# Patient Record
Sex: FEMALE | Race: WHITE | NOT HISPANIC OR LATINO | Employment: OTHER | URBAN - METROPOLITAN AREA
[De-identification: names, ages, dates, MRNs, and addresses within clinical notes are randomized per-mention and may not be internally consistent; named-entity substitution may affect disease eponyms.]

---

## 2020-07-31 ENCOUNTER — APPOINTMENT (OUTPATIENT)
Dept: CT IMAGING | Facility: HOSPITAL | Age: 80
End: 2020-07-31

## 2020-07-31 ENCOUNTER — APPOINTMENT (OUTPATIENT)
Dept: CARDIOLOGY | Facility: HOSPITAL | Age: 80
End: 2020-07-31

## 2020-07-31 ENCOUNTER — HOSPITAL ENCOUNTER (INPATIENT)
Facility: HOSPITAL | Age: 80
LOS: 6 days | Discharge: NURSING, REHAB OR LONG-TERM CARE | End: 2020-08-06
Attending: EMERGENCY MEDICINE | Admitting: INTERNAL MEDICINE

## 2020-07-31 ENCOUNTER — APPOINTMENT (OUTPATIENT)
Dept: GENERAL RADIOLOGY | Facility: HOSPITAL | Age: 80
End: 2020-07-31

## 2020-07-31 DIAGNOSIS — I67.848 OTHER CEREBROVASCULAR VASOSPASM AND VASOCONSTRICTION: ICD-10-CM

## 2020-07-31 DIAGNOSIS — Z78.9 IMPAIRED MOBILITY AND ADLS: ICD-10-CM

## 2020-07-31 DIAGNOSIS — R47.1 DYSARTHRIA: ICD-10-CM

## 2020-07-31 DIAGNOSIS — I63.9 ACUTE ISCHEMIC STROKE (HCC): Primary | ICD-10-CM

## 2020-07-31 DIAGNOSIS — I63.9 ACUTE ISCHEMIC STROKE (HCC): ICD-10-CM

## 2020-07-31 DIAGNOSIS — R41.841 COGNITIVE COMMUNICATION DEFICIT: ICD-10-CM

## 2020-07-31 DIAGNOSIS — Z74.09 IMPAIRED MOBILITY AND ADLS: ICD-10-CM

## 2020-07-31 DIAGNOSIS — R13.12 OROPHARYNGEAL DYSPHAGIA: ICD-10-CM

## 2020-07-31 PROBLEM — E03.9 HYPOTHYROIDISM: Status: ACTIVE | Noted: 2020-07-31

## 2020-07-31 PROBLEM — E11.9 TYPE 2 DIABETES MELLITUS (HCC): Chronic | Status: ACTIVE | Noted: 2020-07-31

## 2020-07-31 PROBLEM — I10 HYPERTENSION: Status: ACTIVE | Noted: 2020-07-31

## 2020-07-31 PROBLEM — E03.9 HYPOTHYROIDISM: Chronic | Status: ACTIVE | Noted: 2020-07-31

## 2020-07-31 PROBLEM — F17.200 SMOKER: Status: ACTIVE | Noted: 2020-07-31

## 2020-07-31 PROBLEM — E11.9 TYPE 2 DIABETES MELLITUS (HCC): Status: ACTIVE | Noted: 2020-07-31

## 2020-07-31 PROBLEM — I10 HYPERTENSION: Chronic | Status: ACTIVE | Noted: 2020-07-31

## 2020-07-31 PROBLEM — E78.5 DYSLIPIDEMIA: Status: ACTIVE | Noted: 2020-07-31

## 2020-07-31 PROBLEM — C82.90 FOLLICULAR LYMPHOMA (HCC): Status: ACTIVE | Noted: 2020-07-31

## 2020-07-31 PROBLEM — E78.5 DYSLIPIDEMIA: Chronic | Status: ACTIVE | Noted: 2020-07-31

## 2020-07-31 LAB
ALT SERPL W P-5'-P-CCNC: 13 U/L (ref 1–33)
APTT PPP: 30.8 SECONDS (ref 24–37)
AST SERPL-CCNC: 19 U/L (ref 1–32)
BASE EXCESS BLDA CALC-SCNC: 4 MMOL/L (ref -5–5)
BASOPHILS # BLD AUTO: 0.03 10*3/MM3 (ref 0–0.2)
BASOPHILS NFR BLD AUTO: 0.3 % (ref 0–1.5)
BH CV ECHO MEAS - AO MAX PG (FULL): 5.3 MMHG
BH CV ECHO MEAS - AO MAX PG: 8.2 MMHG
BH CV ECHO MEAS - AO ROOT AREA (BSA CORRECTED): 1.7
BH CV ECHO MEAS - AO ROOT AREA: 8.3 CM^2
BH CV ECHO MEAS - AO ROOT DIAM: 3.3 CM
BH CV ECHO MEAS - AO V2 MAX: 143.5 CM/SEC
BH CV ECHO MEAS - AVA(V,A): 1.8 CM^2
BH CV ECHO MEAS - AVA(V,D): 1.8 CM^2
BH CV ECHO MEAS - BSA(HAYCOCK): 1.9 M^2
BH CV ECHO MEAS - BSA: 1.9 M^2
BH CV ECHO MEAS - BZI_BMI: 28.1 KILOGRAMS/M^2
BH CV ECHO MEAS - BZI_METRIC_HEIGHT: 167.6 CM
BH CV ECHO MEAS - BZI_METRIC_WEIGHT: 78.9 KG
BH CV ECHO MEAS - EDV(CUBED): 107.8 ML
BH CV ECHO MEAS - EDV(MOD-SP2): 25 ML
BH CV ECHO MEAS - EDV(MOD-SP4): 51 ML
BH CV ECHO MEAS - EDV(TEICH): 105.4 ML
BH CV ECHO MEAS - EF(CUBED): 71.9 %
BH CV ECHO MEAS - EF(MOD-BP): 54 %
BH CV ECHO MEAS - EF(MOD-SP2): 44 %
BH CV ECHO MEAS - EF(MOD-SP4): 60.8 %
BH CV ECHO MEAS - EF(TEICH): 63.5 %
BH CV ECHO MEAS - ESV(CUBED): 30.3 ML
BH CV ECHO MEAS - ESV(MOD-SP2): 14 ML
BH CV ECHO MEAS - ESV(MOD-SP4): 20 ML
BH CV ECHO MEAS - ESV(TEICH): 38.5 ML
BH CV ECHO MEAS - FS: 34.5 %
BH CV ECHO MEAS - IVS/LVPW: 0.93
BH CV ECHO MEAS - IVSD: 0.99 CM
BH CV ECHO MEAS - LA DIMENSION: 3.3 CM
BH CV ECHO MEAS - LA/AO: 1
BH CV ECHO MEAS - LAD MAJOR: 4.7 CM
BH CV ECHO MEAS - LAT PEAK E' VEL: 7 CM/SEC
BH CV ECHO MEAS - LATERAL E/E' RATIO: 11.1
BH CV ECHO MEAS - LV DIASTOLIC VOL/BSA (35-75): 27.1 ML/M^2
BH CV ECHO MEAS - LV MASS(C)D: 174.1 GRAMS
BH CV ECHO MEAS - LV MASS(C)DI: 92.4 GRAMS/M^2
BH CV ECHO MEAS - LV MAX PG: 3 MMHG
BH CV ECHO MEAS - LV MEAN PG: 1.6 MMHG
BH CV ECHO MEAS - LV SYSTOLIC VOL/BSA (12-30): 10.6 ML/M^2
BH CV ECHO MEAS - LV V1 MAX: 86 CM/SEC
BH CV ECHO MEAS - LV V1 MEAN: 59.6 CM/SEC
BH CV ECHO MEAS - LV V1 VTI: 19.7 CM
BH CV ECHO MEAS - LVIDD: 4.8 CM
BH CV ECHO MEAS - LVIDS: 3.1 CM
BH CV ECHO MEAS - LVLD AP2: 6.4 CM
BH CV ECHO MEAS - LVLD AP4: 6.4 CM
BH CV ECHO MEAS - LVLS AP2: 5.5 CM
BH CV ECHO MEAS - LVLS AP4: 5.8 CM
BH CV ECHO MEAS - LVOT AREA (M): 2.8 CM^2
BH CV ECHO MEAS - LVOT AREA: 2.9 CM^2
BH CV ECHO MEAS - LVOT DIAM: 1.9 CM
BH CV ECHO MEAS - LVPWD: 1 CM
BH CV ECHO MEAS - MED PEAK E' VEL: 5.2 CM/SEC
BH CV ECHO MEAS - MEDIAL E/E' RATIO: 15
BH CV ECHO MEAS - MV A MAX VEL: 100.7 CM/SEC
BH CV ECHO MEAS - MV DEC TIME: 0.16 SEC
BH CV ECHO MEAS - MV E MAX VEL: 79.5 CM/SEC
BH CV ECHO MEAS - MV E/A: 0.79
BH CV ECHO MEAS - PA ACC SLOPE: 796.1 CM/SEC^2
BH CV ECHO MEAS - PA ACC TIME: 0.11 SEC
BH CV ECHO MEAS - PA MAX PG: 5.3 MMHG
BH CV ECHO MEAS - PA PR(ACCEL): 27.5 MMHG
BH CV ECHO MEAS - PA V2 MAX: 115.1 CM/SEC
BH CV ECHO MEAS - SI(CUBED): 41.1 ML/M^2
BH CV ECHO MEAS - SI(LVOT): 30.6 ML/M^2
BH CV ECHO MEAS - SI(MOD-SP2): 5.8 ML/M^2
BH CV ECHO MEAS - SI(MOD-SP4): 16.4 ML/M^2
BH CV ECHO MEAS - SI(TEICH): 35.5 ML/M^2
BH CV ECHO MEAS - SV(CUBED): 77.5 ML
BH CV ECHO MEAS - SV(LVOT): 57.7 ML
BH CV ECHO MEAS - SV(MOD-SP2): 11 ML
BH CV ECHO MEAS - SV(MOD-SP4): 31 ML
BH CV ECHO MEAS - SV(TEICH): 66.9 ML
BH CV ECHO MEAS - TAPSE (>1.6): 2.2 CM2
BH CV ECHO MEASUREMENTS AVERAGE E/E' RATIO: 13.03
BH CV VAS BP RIGHT ARM: NORMAL MMHG
BH CV XLRA - RV BASE: 3.2 CM
BH CV XLRA - RV LENGTH: 8.1 CM
BH CV XLRA - RV MID: 3 CM
BH CV XLRA - TDI S': 14.4 CM/SEC
CA-I BLDA-SCNC: 1.2 MMOL/L (ref 1.2–1.32)
CO2 BLDA-SCNC: 31 MMOL/L (ref 24–29)
CREAT BLDA-MCNC: 1.3 MG/DL (ref 0.6–1.3)
DEPRECATED RDW RBC AUTO: 46.6 FL (ref 37–54)
EOSINOPHIL # BLD AUTO: 0.14 10*3/MM3 (ref 0–0.4)
EOSINOPHIL NFR BLD AUTO: 1.2 % (ref 0.3–6.2)
ERYTHROCYTE [DISTWIDTH] IN BLOOD BY AUTOMATED COUNT: 14.1 % (ref 12.3–15.4)
GLUCOSE BLDC GLUCOMTR-MCNC: 197 MG/DL (ref 70–130)
GLUCOSE BLDC GLUCOMTR-MCNC: 235 MG/DL (ref 70–130)
GLUCOSE BLDC GLUCOMTR-MCNC: 267 MG/DL (ref 70–130)
GLUCOSE BLDC GLUCOMTR-MCNC: 271 MG/DL (ref 70–130)
HCO3 BLDA-SCNC: 29.1 MMOL/L (ref 22–26)
HCT VFR BLD AUTO: 42 % (ref 34–46.6)
HCT VFR BLDA CALC: 46 % (ref 38–51)
HGB BLD-MCNC: 13.8 G/DL (ref 12–15.9)
HGB BLDA-MCNC: 15.6 G/DL (ref 12–17)
HOLD SPECIMEN: NORMAL
HOLD SPECIMEN: NORMAL
IMM GRANULOCYTES # BLD AUTO: 0.03 10*3/MM3 (ref 0–0.05)
IMM GRANULOCYTES NFR BLD AUTO: 0.3 % (ref 0–0.5)
INR PPP: 0.9 (ref 0.8–1.2)
LEFT ATRIUM VOLUME INDEX: 21.7 ML/M^2
LEFT ATRIUM VOLUME: 41 ML
LV EF 2D ECHO EST: 55 %
LYMPHOCYTES # BLD AUTO: 1.54 10*3/MM3 (ref 0.7–3.1)
LYMPHOCYTES NFR BLD AUTO: 12.9 % (ref 19.6–45.3)
MAXIMAL PREDICTED HEART RATE: 142 BPM
MCH RBC QN AUTO: 29.6 PG (ref 26.6–33)
MCHC RBC AUTO-ENTMCNC: 32.9 G/DL (ref 31.5–35.7)
MCV RBC AUTO: 89.9 FL (ref 79–97)
MONOCYTES # BLD AUTO: 0.82 10*3/MM3 (ref 0.1–0.9)
MONOCYTES NFR BLD AUTO: 6.9 % (ref 5–12)
NEUTROPHILS NFR BLD AUTO: 78.4 % (ref 42.7–76)
NEUTROPHILS NFR BLD AUTO: 9.37 10*3/MM3 (ref 1.7–7)
NRBC BLD AUTO-RTO: 0 /100 WBC (ref 0–0.2)
PCO2 BLDA: 46.9 MM HG (ref 35–45)
PH BLDA: 7.4 PH UNITS (ref 7.35–7.6)
PLATELET # BLD AUTO: 234 10*3/MM3 (ref 140–450)
PMV BLD AUTO: 10 FL (ref 6–12)
PO2 BLDA: 35 MMHG (ref 80–105)
POTASSIUM BLDA-SCNC: 4 MMOL/L (ref 3.5–4.9)
PROTHROMBIN TIME: 11.2 SECONDS (ref 12.8–15.2)
RBC # BLD AUTO: 4.67 10*6/MM3 (ref 3.77–5.28)
SAO2 % BLDA: 66 % (ref 95–98)
SARS-COV-2 RNA RESP QL NAA+PROBE: NOT DETECTED
SODIUM BLD-SCNC: 141 MMOL/L (ref 138–146)
STRESS TARGET HR: 121 BPM
TROPONIN T SERPL-MCNC: <0.01 NG/ML (ref 0–0.03)
WBC # BLD AUTO: 11.93 10*3/MM3 (ref 3.4–10.8)
WHOLE BLOOD HOLD SPECIMEN: NORMAL
WHOLE BLOOD HOLD SPECIMEN: NORMAL

## 2020-07-31 PROCEDURE — 87635 SARS-COV-2 COVID-19 AMP PRB: CPT | Performed by: EMERGENCY MEDICINE

## 2020-07-31 PROCEDURE — 63710000001 INSULIN REGULAR HUMAN PER 5 UNITS: Performed by: INTERNAL MEDICINE

## 2020-07-31 PROCEDURE — C1894 INTRO/SHEATH, NON-LASER: HCPCS | Performed by: NEUROLOGICAL SURGERY

## 2020-07-31 PROCEDURE — 99285 EMERGENCY DEPT VISIT HI MDM: CPT

## 2020-07-31 PROCEDURE — 82330 ASSAY OF CALCIUM: CPT

## 2020-07-31 PROCEDURE — 84450 TRANSFERASE (AST) (SGOT): CPT | Performed by: EMERGENCY MEDICINE

## 2020-07-31 PROCEDURE — 82565 ASSAY OF CREATININE: CPT

## 2020-07-31 PROCEDURE — 84460 ALANINE AMINO (ALT) (SGPT): CPT | Performed by: EMERGENCY MEDICINE

## 2020-07-31 PROCEDURE — 93005 ELECTROCARDIOGRAM TRACING: CPT | Performed by: EMERGENCY MEDICINE

## 2020-07-31 PROCEDURE — 0 IOPAMIDOL PER 1 ML: Performed by: EMERGENCY MEDICINE

## 2020-07-31 PROCEDURE — 61645 PERQ ART M-THROMBECT &/NFS: CPT | Performed by: NEUROLOGICAL SURGERY

## 2020-07-31 PROCEDURE — C2628 CATHETER, OCCLUSION: HCPCS | Performed by: NEUROLOGICAL SURGERY

## 2020-07-31 PROCEDURE — B41F1ZZ FLUOROSCOPY OF RIGHT LOWER EXTREMITY ARTERIES USING LOW OSMOLAR CONTRAST: ICD-10-PCS | Performed by: NEUROLOGICAL SURGERY

## 2020-07-31 PROCEDURE — C1769 GUIDE WIRE: HCPCS | Performed by: NEUROLOGICAL SURGERY

## 2020-07-31 PROCEDURE — 82962 GLUCOSE BLOOD TEST: CPT

## 2020-07-31 PROCEDURE — 71045 X-RAY EXAM CHEST 1 VIEW: CPT

## 2020-07-31 PROCEDURE — 93306 TTE W/DOPPLER COMPLETE: CPT | Performed by: INTERNAL MEDICINE

## 2020-07-31 PROCEDURE — C1887 CATHETER, GUIDING: HCPCS | Performed by: NEUROLOGICAL SURGERY

## 2020-07-31 PROCEDURE — 99291 CRITICAL CARE FIRST HOUR: CPT | Performed by: INTERNAL MEDICINE

## 2020-07-31 PROCEDURE — 70450 CT HEAD/BRAIN W/O DYE: CPT

## 2020-07-31 PROCEDURE — 0042T HC CT CEREBRAL PERFUSION W/WO CONTRAST: CPT

## 2020-07-31 PROCEDURE — B3161ZZ FLUOROSCOPY OF RIGHT INTERNAL CAROTID ARTERY USING LOW OSMOLAR CONTRAST: ICD-10-PCS | Performed by: NEUROLOGICAL SURGERY

## 2020-07-31 PROCEDURE — 85025 COMPLETE CBC W/AUTO DIFF WBC: CPT | Performed by: EMERGENCY MEDICINE

## 2020-07-31 PROCEDURE — 84132 ASSAY OF SERUM POTASSIUM: CPT

## 2020-07-31 PROCEDURE — 85014 HEMATOCRIT: CPT

## 2020-07-31 PROCEDURE — 82803 BLOOD GASES ANY COMBINATION: CPT

## 2020-07-31 PROCEDURE — C1760 CLOSURE DEV, VASC: HCPCS | Performed by: NEUROLOGICAL SURGERY

## 2020-07-31 PROCEDURE — B3131ZZ FLUOROSCOPY OF RIGHT COMMON CAROTID ARTERY USING LOW OSMOLAR CONTRAST: ICD-10-PCS | Performed by: NEUROLOGICAL SURGERY

## 2020-07-31 PROCEDURE — 25010000002 PHENYLEPHRINE 10 MG/ML SOLUTION: Performed by: PSYCHIATRY & NEUROLOGY

## 2020-07-31 PROCEDURE — 99291 CRITICAL CARE FIRST HOUR: CPT | Performed by: PSYCHIATRY & NEUROLOGY

## 2020-07-31 PROCEDURE — 25010000002 ONDANSETRON PER 1 MG: Performed by: PSYCHIATRY & NEUROLOGY

## 2020-07-31 PROCEDURE — 82947 ASSAY GLUCOSE BLOOD QUANT: CPT

## 2020-07-31 PROCEDURE — 0 IOPAMIDOL PER 1 ML: Performed by: INTERNAL MEDICINE

## 2020-07-31 PROCEDURE — 85730 THROMBOPLASTIN TIME PARTIAL: CPT | Performed by: EMERGENCY MEDICINE

## 2020-07-31 PROCEDURE — 70496 CT ANGIOGRAPHY HEAD: CPT

## 2020-07-31 PROCEDURE — 84484 ASSAY OF TROPONIN QUANT: CPT | Performed by: EMERGENCY MEDICINE

## 2020-07-31 PROCEDURE — 25010000002 HYDRALAZINE PER 20 MG: Performed by: NEUROLOGICAL SURGERY

## 2020-07-31 PROCEDURE — 0 IODIXANOL PER 1 ML: Performed by: NEUROLOGICAL SURGERY

## 2020-07-31 PROCEDURE — 70498 CT ANGIOGRAPHY NECK: CPT

## 2020-07-31 PROCEDURE — 93306 TTE W/DOPPLER COMPLETE: CPT

## 2020-07-31 PROCEDURE — 84295 ASSAY OF SERUM SODIUM: CPT

## 2020-07-31 PROCEDURE — 85610 PROTHROMBIN TIME: CPT

## 2020-07-31 PROCEDURE — 03CG3ZZ EXTIRPATION OF MATTER FROM INTRACRANIAL ARTERY, PERCUTANEOUS APPROACH: ICD-10-PCS | Performed by: NEUROLOGICAL SURGERY

## 2020-07-31 RX ORDER — SODIUM CHLORIDE 0.9 % (FLUSH) 0.9 %
10 SYRINGE (ML) INJECTION EVERY 12 HOURS SCHEDULED
Status: DISCONTINUED | OUTPATIENT
Start: 2020-07-31 | End: 2020-08-06 | Stop reason: HOSPADM

## 2020-07-31 RX ORDER — LORATADINE 10 MG/1
10 TABLET ORAL EVERY MORNING
COMMUNITY

## 2020-07-31 RX ORDER — LIDOCAINE HYDROCHLORIDE 10 MG/ML
INJECTION, SOLUTION EPIDURAL; INFILTRATION; INTRACAUDAL; PERINEURAL AS NEEDED
Status: DISCONTINUED | OUTPATIENT
Start: 2020-07-31 | End: 2020-07-31 | Stop reason: HOSPADM

## 2020-07-31 RX ORDER — LEVOTHYROXINE SODIUM 88 UG/1
88 TABLET ORAL EVERY MORNING
Status: DISCONTINUED | OUTPATIENT
Start: 2020-07-31 | End: 2020-08-06 | Stop reason: HOSPADM

## 2020-07-31 RX ORDER — ASPIRIN 81 MG/1
81 TABLET, CHEWABLE ORAL DAILY
COMMUNITY
End: 2020-08-06 | Stop reason: HOSPADM

## 2020-07-31 RX ORDER — LEVOTHYROXINE SODIUM 88 UG/1
88 TABLET ORAL EVERY MORNING
COMMUNITY

## 2020-07-31 RX ORDER — IODIXANOL 320 MG/ML
INJECTION, SOLUTION INTRAVASCULAR AS NEEDED
Status: DISCONTINUED | OUTPATIENT
Start: 2020-07-31 | End: 2020-07-31 | Stop reason: HOSPADM

## 2020-07-31 RX ORDER — SPIRONOLACTONE 25 MG/1
25 TABLET ORAL 2 TIMES DAILY
COMMUNITY

## 2020-07-31 RX ORDER — ONDANSETRON 2 MG/ML
4 INJECTION INTRAMUSCULAR; INTRAVENOUS EVERY 6 HOURS PRN
Status: DISCONTINUED | OUTPATIENT
Start: 2020-07-31 | End: 2020-08-06 | Stop reason: HOSPADM

## 2020-07-31 RX ORDER — LOSARTAN POTASSIUM 100 MG/1
100 TABLET ORAL EVERY MORNING
COMMUNITY

## 2020-07-31 RX ORDER — ASPIRIN 300 MG/1
300 SUPPOSITORY RECTAL ONCE
Status: COMPLETED | OUTPATIENT
Start: 2020-07-31 | End: 2020-07-31

## 2020-07-31 RX ORDER — OMEPRAZOLE 20 MG/1
20 CAPSULE, DELAYED RELEASE ORAL EVERY MORNING
COMMUNITY
End: 2020-08-06 | Stop reason: HOSPADM

## 2020-07-31 RX ORDER — HYDRALAZINE HYDROCHLORIDE 20 MG/ML
INJECTION INTRAMUSCULAR; INTRAVENOUS AS NEEDED
Status: DISCONTINUED | OUTPATIENT
Start: 2020-07-31 | End: 2020-07-31 | Stop reason: HOSPADM

## 2020-07-31 RX ORDER — GABAPENTIN 300 MG/1
300 CAPSULE ORAL 2 TIMES DAILY
COMMUNITY

## 2020-07-31 RX ORDER — NIFEDIPINE 60 MG/1
60 TABLET, EXTENDED RELEASE ORAL EVERY MORNING
COMMUNITY

## 2020-07-31 RX ORDER — ASPIRIN 325 MG
325 TABLET ORAL DAILY
Status: DISCONTINUED | OUTPATIENT
Start: 2020-08-01 | End: 2020-08-06 | Stop reason: HOSPADM

## 2020-07-31 RX ORDER — SODIUM CHLORIDE 0.9 % (FLUSH) 0.9 %
10 SYRINGE (ML) INJECTION AS NEEDED
Status: DISCONTINUED | OUTPATIENT
Start: 2020-07-31 | End: 2020-08-06 | Stop reason: HOSPADM

## 2020-07-31 RX ORDER — ASPIRIN 300 MG/1
300 SUPPOSITORY RECTAL DAILY
Status: DISCONTINUED | OUTPATIENT
Start: 2020-08-01 | End: 2020-08-06 | Stop reason: HOSPADM

## 2020-07-31 RX ORDER — ATORVASTATIN CALCIUM 40 MG/1
40 TABLET, FILM COATED ORAL
Status: ON HOLD | COMMUNITY
End: 2020-08-06 | Stop reason: SDUPTHER

## 2020-07-31 RX ORDER — ATORVASTATIN CALCIUM 40 MG/1
80 TABLET, FILM COATED ORAL NIGHTLY
Status: DISCONTINUED | OUTPATIENT
Start: 2020-07-31 | End: 2020-08-06 | Stop reason: HOSPADM

## 2020-07-31 RX ORDER — METOPROLOL SUCCINATE 25 MG/1
25 TABLET, EXTENDED RELEASE ORAL
Status: ON HOLD | COMMUNITY
End: 2020-08-06 | Stop reason: SDUPTHER

## 2020-07-31 RX ORDER — PHENYLEPHRINE HCL IN 0.9% NACL 0.5 MG/5ML
.5-3 SYRINGE (ML) INTRAVENOUS
Status: DISCONTINUED | OUTPATIENT
Start: 2020-07-31 | End: 2020-08-02

## 2020-07-31 RX ADMIN — IOPAMIDOL 100 ML: 755 INJECTION, SOLUTION INTRAVENOUS at 09:45

## 2020-07-31 RX ADMIN — SODIUM CHLORIDE, PRESERVATIVE FREE 10 ML: 5 INJECTION INTRAVENOUS at 20:23

## 2020-07-31 RX ADMIN — NICARDIPINE HYDROCHLORIDE 12.5 MG/HR: 0.1 INJECTION, SOLUTION INTRAVENOUS at 08:24

## 2020-07-31 RX ADMIN — ONDANSETRON 4 MG: 2 INJECTION INTRAMUSCULAR; INTRAVENOUS at 15:16

## 2020-07-31 RX ADMIN — ONDANSETRON 4 MG: 2 INJECTION INTRAMUSCULAR; INTRAVENOUS at 09:15

## 2020-07-31 RX ADMIN — INSULIN HUMAN 4 UNITS: 100 INJECTION, SOLUTION PARENTERAL at 18:07

## 2020-07-31 RX ADMIN — INSULIN HUMAN 2 UNITS: 100 INJECTION, SOLUTION PARENTERAL at 12:17

## 2020-07-31 RX ADMIN — IOPAMIDOL 115 ML: 755 INJECTION, SOLUTION INTRAVENOUS at 05:24

## 2020-07-31 RX ADMIN — PHENYLEPHRINE HYDROCHLORIDE 0.5 MCG/KG/MIN: 10 INJECTION INTRAVENOUS at 09:49

## 2020-07-31 RX ADMIN — ASPIRIN 300 MG: 300 SUPPOSITORY RECTAL at 06:25

## 2020-07-31 RX ADMIN — SODIUM CHLORIDE, PRESERVATIVE FREE 10 ML: 5 INJECTION INTRAVENOUS at 10:06

## 2020-08-01 ENCOUNTER — APPOINTMENT (OUTPATIENT)
Dept: MRI IMAGING | Facility: HOSPITAL | Age: 80
End: 2020-08-01

## 2020-08-01 ENCOUNTER — APPOINTMENT (OUTPATIENT)
Dept: CT IMAGING | Facility: HOSPITAL | Age: 80
End: 2020-08-01

## 2020-08-01 LAB
ANION GAP SERPL CALCULATED.3IONS-SCNC: 19 MMOL/L (ref 5–15)
BASOPHILS # BLD AUTO: 0.02 10*3/MM3 (ref 0–0.2)
BASOPHILS NFR BLD AUTO: 0.1 % (ref 0–1.5)
BUN SERPL-MCNC: 26 MG/DL (ref 8–23)
BUN/CREAT SERPL: 14.4 (ref 7–25)
CALCIUM SPEC-SCNC: 9.7 MG/DL (ref 8.6–10.5)
CHLORIDE SERPL-SCNC: 101 MMOL/L (ref 98–107)
CHOLEST SERPL-MCNC: 135 MG/DL (ref 0–200)
CO2 SERPL-SCNC: 20 MMOL/L (ref 22–29)
CREAT SERPL-MCNC: 1.81 MG/DL (ref 0.57–1)
DEPRECATED RDW RBC AUTO: 51 FL (ref 37–54)
EOSINOPHIL # BLD AUTO: 0 10*3/MM3 (ref 0–0.4)
EOSINOPHIL NFR BLD AUTO: 0 % (ref 0.3–6.2)
ERYTHROCYTE [DISTWIDTH] IN BLOOD BY AUTOMATED COUNT: 14.8 % (ref 12.3–15.4)
GFR SERPL CREATININE-BSD FRML MDRD: 27 ML/MIN/1.73
GLUCOSE BLDC GLUCOMTR-MCNC: 226 MG/DL (ref 70–130)
GLUCOSE BLDC GLUCOMTR-MCNC: 232 MG/DL (ref 70–130)
GLUCOSE BLDC GLUCOMTR-MCNC: 266 MG/DL (ref 70–130)
GLUCOSE BLDC GLUCOMTR-MCNC: 276 MG/DL (ref 70–130)
GLUCOSE SERPL-MCNC: 257 MG/DL (ref 65–99)
HBA1C MFR BLD: 7.3 % (ref 4.8–5.6)
HCT VFR BLD AUTO: 41.3 % (ref 34–46.6)
HDLC SERPL-MCNC: 57 MG/DL (ref 40–60)
HGB BLD-MCNC: 12.8 G/DL (ref 12–15.9)
IMM GRANULOCYTES # BLD AUTO: 0.1 10*3/MM3 (ref 0–0.05)
IMM GRANULOCYTES NFR BLD AUTO: 0.6 % (ref 0–0.5)
LDLC SERPL CALC-MCNC: 52 MG/DL (ref 0–100)
LDLC/HDLC SERPL: 0.92 {RATIO}
LYMPHOCYTES # BLD AUTO: 1.02 10*3/MM3 (ref 0.7–3.1)
LYMPHOCYTES NFR BLD AUTO: 5.9 % (ref 19.6–45.3)
MAGNESIUM SERPL-MCNC: 2.9 MG/DL (ref 1.6–2.4)
MCH RBC QN AUTO: 29.2 PG (ref 26.6–33)
MCHC RBC AUTO-ENTMCNC: 31 G/DL (ref 31.5–35.7)
MCV RBC AUTO: 94.3 FL (ref 79–97)
MONOCYTES # BLD AUTO: 1.74 10*3/MM3 (ref 0.1–0.9)
MONOCYTES NFR BLD AUTO: 10 % (ref 5–12)
NEUTROPHILS NFR BLD AUTO: 14.52 10*3/MM3 (ref 1.7–7)
NEUTROPHILS NFR BLD AUTO: 83.4 % (ref 42.7–76)
NRBC BLD AUTO-RTO: 0 /100 WBC (ref 0–0.2)
PLATELET # BLD AUTO: 197 10*3/MM3 (ref 140–450)
PMV BLD AUTO: 10 FL (ref 6–12)
POTASSIUM SERPL-SCNC: 4.5 MMOL/L (ref 3.5–5.2)
RBC # BLD AUTO: 4.38 10*6/MM3 (ref 3.77–5.28)
SODIUM SERPL-SCNC: 140 MMOL/L (ref 136–145)
TRIGL SERPL-MCNC: 128 MG/DL (ref 0–150)
TSH SERPL DL<=0.05 MIU/L-ACNC: 4.04 UIU/ML (ref 0.27–4.2)
VLDLC SERPL-MCNC: 25.6 MG/DL
WBC # BLD AUTO: 17.4 10*3/MM3 (ref 3.4–10.8)

## 2020-08-01 PROCEDURE — 97530 THERAPEUTIC ACTIVITIES: CPT

## 2020-08-01 PROCEDURE — 92610 EVALUATE SWALLOWING FUNCTION: CPT

## 2020-08-01 PROCEDURE — 80061 LIPID PANEL: CPT | Performed by: NURSE PRACTITIONER

## 2020-08-01 PROCEDURE — 83735 ASSAY OF MAGNESIUM: CPT | Performed by: INTERNAL MEDICINE

## 2020-08-01 PROCEDURE — 99232 SBSQ HOSP IP/OBS MODERATE 35: CPT | Performed by: INTERNAL MEDICINE

## 2020-08-01 PROCEDURE — 70450 CT HEAD/BRAIN W/O DYE: CPT

## 2020-08-01 PROCEDURE — 85025 COMPLETE CBC W/AUTO DIFF WBC: CPT | Performed by: INTERNAL MEDICINE

## 2020-08-01 PROCEDURE — 84443 ASSAY THYROID STIM HORMONE: CPT | Performed by: NURSE PRACTITIONER

## 2020-08-01 PROCEDURE — 92523 SPEECH SOUND LANG COMPREHEN: CPT

## 2020-08-01 PROCEDURE — 83036 HEMOGLOBIN GLYCOSYLATED A1C: CPT | Performed by: NURSE PRACTITIONER

## 2020-08-01 PROCEDURE — 80048 BASIC METABOLIC PNL TOTAL CA: CPT | Performed by: INTERNAL MEDICINE

## 2020-08-01 PROCEDURE — 97162 PT EVAL MOD COMPLEX 30 MIN: CPT

## 2020-08-01 PROCEDURE — 82962 GLUCOSE BLOOD TEST: CPT

## 2020-08-01 PROCEDURE — 70551 MRI BRAIN STEM W/O DYE: CPT

## 2020-08-01 PROCEDURE — 63710000001 INSULIN REGULAR HUMAN PER 5 UNITS

## 2020-08-01 PROCEDURE — 97165 OT EVAL LOW COMPLEX 30 MIN: CPT

## 2020-08-01 RX ORDER — SODIUM CHLORIDE 9 MG/ML
50 INJECTION, SOLUTION INTRAVENOUS CONTINUOUS
Status: DISCONTINUED | OUTPATIENT
Start: 2020-08-01 | End: 2020-08-02

## 2020-08-01 RX ORDER — CLOPIDOGREL BISULFATE 75 MG/1
75 TABLET ORAL DAILY
Status: DISCONTINUED | OUTPATIENT
Start: 2020-08-01 | End: 2020-08-06 | Stop reason: HOSPADM

## 2020-08-01 RX ADMIN — SODIUM CHLORIDE 50 ML/HR: 9 INJECTION, SOLUTION INTRAVENOUS at 18:11

## 2020-08-01 RX ADMIN — INSULIN HUMAN 4 UNITS: 100 INJECTION, SOLUTION PARENTERAL at 06:03

## 2020-08-01 RX ADMIN — ATORVASTATIN CALCIUM 80 MG: 40 TABLET, FILM COATED ORAL at 20:02

## 2020-08-01 RX ADMIN — CLOPIDOGREL BISULFATE 75 MG: 75 TABLET ORAL at 12:26

## 2020-08-01 RX ADMIN — INSULIN HUMAN 3 UNITS: 100 INJECTION, SOLUTION PARENTERAL at 12:26

## 2020-08-01 RX ADMIN — SODIUM CHLORIDE, PRESERVATIVE FREE 10 ML: 5 INJECTION INTRAVENOUS at 20:02

## 2020-08-01 RX ADMIN — INSULIN HUMAN 4 UNITS: 100 INJECTION, SOLUTION PARENTERAL at 00:46

## 2020-08-01 RX ADMIN — NICARDIPINE HYDROCHLORIDE 2.5 MG/HR: 0.1 INJECTION, SOLUTION INTRAVENOUS at 19:59

## 2020-08-01 RX ADMIN — ASPIRIN 300 MG: 300 SUPPOSITORY RECTAL at 08:11

## 2020-08-01 RX ADMIN — INSULIN HUMAN 3 UNITS: 100 INJECTION, SOLUTION PARENTERAL at 16:18

## 2020-08-01 RX ADMIN — SODIUM CHLORIDE, PRESERVATIVE FREE 10 ML: 5 INJECTION INTRAVENOUS at 08:13

## 2020-08-01 NOTE — THERAPY RE-EVALUATION
Acute Care - Speech Language Pathology   Swallow Re-Evaluation [facility]     Patient Name: Marquez Myrick  : 1940  MRN: [id number]  Today's Date: 2020  Onset of Illness/Injury or Date of Surgery: 20     Referring Physician: BRITTNEE Coyle      Admit Date: 2020    Visit Dx:     ICD-10-CM ICD-9-CM   1. Acute ischemic stroke (CMS/HCC) I63.9 434.91   2. Dysarthria R47.1 784.51   3. Impaired mobility and ADLs Z74.09 V49.89    Z78.9    4. Dysphagia, unspecified type R13.10 787.20   5. Cognitive communication deficit R41.841 799.52     Patient Active Problem List   Diagnosis   • H/O grade II follicular lymphoma    • Hypertension   • Dyslipidemia   • Hypothyroidism   • T2DM    • AIS    • Smoker     Past Medical History:   Diagnosis Date   • Dyslipidemia 2020   • Hypertension 2020   • Hypothyroidism 2020     Past Surgical History:   Procedure Laterality Date   • APPENDECTOMY     • CHOLECYSTECTOMY     • COLON SURGERY Right     R colon resection    • DEEP NECK LYMPH NODE BIOPSY / EXCISION     • HYSTERECTOMY     • PORTACATH PLACEMENT Left     L subclavian         SWALLOW EVALUATION (last 72 hours)      SLP Adult Swallow Evaluation     Row Name 20 1420 20 0800                Rehab Evaluation    Document Type  re-evaluation  -  evaluation  -SM       Subjective Information  --  no complaints  -SM       Patient Observations  lethargic;cooperative  -  lethargic;cooperative  -       Patient/Family Observations  Son present  -SM  Dtr present  -          General Information    Patient Profile Reviewed  --  yes  -SM       Pertinent History Of Current Problem  --  R frontal infarct, s/p mechanical thrombectomy  -       Current Method of Nutrition  NPO  -SM  NPO  -SM       Prior Level of Function-Swallowing  --  safe, efficient swallowing in all situations  -       Plans/Goals Discussed with  --  patient and family;agreed upon  -       Barriers to Rehab   --  none identified  -       Patient's Goals for Discharge  --  return to PO diet  -       Family Goals for Discharge  --  patient able to return to PO diet;patient able to eat/drink without coughing/choking  -          Pain Assessment    Additional Documentation  --  Pain Scale: Numbers Pre/Post-Treatment (Group)  -          Pain Scale: Numbers Pre/Post-Treatment    Pain Scale: Numbers, Pretreatment  --  0/10 - no pain  -SM       Pain Scale: Numbers, Post-Treatment  --  0/10 - no pain  -          Oral Musculature and Cranial Nerve Assessment    Oral Labial or Buccal Impairment, Detail, Cranial Nerve VII (Facial):  --  left labial droop  -SM       Lingual Impairment, Detail. Cranial Nerves IX, XII (Glossopharyngeal and Hypoglossal)  --  reduced strength left  -          Clinical Swallow Eval    Oral Prep Phase  --  impaired  -SM       Oral Transit  --  impaired  -SM       Pharyngeal Phase  --  suspected pharyngeal impairment  -       Clinical Swallow Evaluation Summary  Lethargic though when cued to alert, showing no s/s aspiraiton with nectar-thick liquids, pudding, or solids - though prolonged manipulation with solids. Will initiate modified PO diet and arrange for FEES tomorrow, 8/2. Allow PO intake only when alert. RN in agreement.   -SM  --          Oral Prep Concerns    Oral Prep Concerns  --  prolonged mastication;inefficient mastication  -          Oral Transit Concerns    Oral Transit Concerns  --  increased oral transit time  -          Pharyngeal Phase Concerns    Pharyngeal Phase Concerns  --  cough  -       Cough  --  thin;other (see comments) with large sip and as liquid wash  -          Clinical Impression    SLP Swallowing Diagnosis  moderate;oral dysfunction;suspected pharyngeal dysfunction  -  moderate;oral dysfunction;suspected pharyngeal dysfunction  -       Functional Impact  risk of aspiration/pneumonia  -  risk of aspiration/pneumonia  -          Recommendations     SLP Diet Recommendation  puree with some mashed;nectar thick liquids  -  NPO;other (see comments) ok for ice chips  -       Recommended Diagnostics  FEES 8/2  -  VFSS ([facility] [place])  -       Recommended Precautions and Strategies  upright posture during/after eating;small bites of food and sips of liquid;other (see comments) intake only when alert.   -  upright posture during/after eating  -       SLP Rec. for Method of Medication Administration  meds whole;meds crushed;with pudding or applesauce;as tolerated  -  meds whole;meds crushed;with pudding or applesauce  -       Monitor for Signs of Aspiration  yes;notify SLP if any concerns  -  --         User Key  (r) = Recorded By, (t) = Taken By, (c) = Cosigned By    Initials Name Effective Dates    Lisseth Martinez, MS CCC-SLP 06/22/15 -           EDUCATION  The patient has been educated in the following areas:   Dysphagia (Swallowing Impairment) Modified Diet Instruction.    SLP Recommendation and Plan  SLP Swallowing Diagnosis: moderate, oral dysfunction, suspected pharyngeal dysfunction  SLP Diet Recommendation: puree with some mashed, nectar thick liquids  Recommended Precautions and Strategies: upright posture during/after eating, small bites of food and sips of liquid, other (see comments)(intake only when alert. )  SLP Rec. for Method of Medication Administration: meds whole, meds crushed, with pudding or applesauce, as tolerated     Monitor for Signs of Aspiration: yes, notify SLP if any concerns  Recommended Diagnostics: FEES(8/2)     Anticipated Dischage Disposition (SLP): inpatient rehabilitation facility, anticipate therapy at next level of care        Predicted Duration Therapy Intervention (Days): until discharge       Plan of Care Reviewed With: patient, katelyn    SLP GOALS     Row Name 08/01/20 0800             Articulation Goal 1 (SLP)    Improve Articulation Goal 1 (SLP)  by over-articulating at word level;80%;with minimal cues  (75-90%)  -SM      Time Frame (Articulation Goal 1, SLP)  short term goal (STG)  -SM         Attention Goal 1 (SLP)    Improve Attention by Goal 1 (SLP)  complete sustained attention task;100%;with minimal cues (75-90%)  -SM      Time Frame (Attention Goal 1, SLP)  short term goal (STG)  -SM         Right Hemisphere Function Goal 1 (SLP)    Improve Right Hemisphere Function Through Goal 1 (SLP)  demonstrate awareness of communication partner in left visual field;use compensatory strategies for left neglect;identify physical/cognitive strengths and limitations;70%;with moderate cues (50-74%)  -SM      Time Frame (Right Hemisphere Function Goal 1, SLP)  short term goal (STG)  -SM         Additional Goal 1 (SLP)    Additional Goal 1, SLP  LTG: Improve cog-comm skills in order to participate in care while in hospital setting with 100% accuracy and min cues  -SM        User Key  (r) = Recorded By, (t) = Taken By, (c) = Cosigned By    Initials Name Provider Type    Lisseth Martinez MS CCC-SLP Speech and Language Pathologist             Time Calculation:   Time Calculation- SLP     Row Name 08/01/20 1602 08/01/20 1234          Time Calculation- SLP    SLP Start Time  1420  -SM  0800  -     SLP Received On  08/01/20  -  08/01/20  -       User Key  (r) = Recorded By, (t) = Taken By, (c) = Cosigned By    Initials Name Provider Type    Lisseth Martinez MS CCC-SLP Speech and Language Pathologist          Therapy Charges for Today     Code Description Service Date Service Provider Modifiers Qty    38838360703 HC ST EVAL ORAL PHARYNG SWALLOW 3 8/1/2020 Lisseth uSh MS CCC-SLP GN 1    24976501198 HC ST EVAL SPEECH AND PROD W LANG  2 8/1/2020 Lisseth Suh MS CCC-SLP GN 1    15433916129 HC ST EVAL ORAL PHARYNG SWALLOW 2 8/1/2020 Lisseth Suh MS CCC-SLP GN 1        Patient was not wearing a face mask and did not exhibit coughing during this therapy encounter.  Procedure performed was  aerosolizing, involved close contact (within 6 feet for at least 15 minutes or longer), and did not involve contact with infectious secretions or specimens.  Therapist used appropriate personal protective equipment including gloves, standard procedure mask and eye protection.  Appropriate PPE was worn during the entire therapy session.  Hand hygiene was completed before and after therapy session.          Lisseth Suh MS CCC-SLP  8/1/2020

## 2020-08-01 NOTE — PLAN OF CARE
Handoff NIHSS was a 12 this morning. No neuro changes on shift.     Speech evaluation done. Until FEEs can be done (tomorrow morning), the patient can have a dysphagia 3 diet, nectar, and pills in applesauce. Hold if patient appears to cough or have any difficulty.     Cardene currently on 5. Labile pressures. Watch closely.

## 2020-08-01 NOTE — PLAN OF CARE
Problem: Patient Care Overview  Goal: Plan of Care Review  Outcome: Ongoing (interventions implemented as appropriate)  Flowsheets (Taken 8/1/2020 0920)  Outcome Summary: OT initial eval and expanded chart review completed. Pt presents with multiple comorbidities and decreased independence with ADL's and mobility. Recommend continued skilled OT services and IRF at d/c.

## 2020-08-01 NOTE — PROGRESS NOTES
INTENSIVIST   PROGRESS NOTE     Hospital:  LOS: 1 day     Ms. Marquez Myrick, 80 y.o. female is followed for a Chief Complaint of: CVA      Subjective   S     Interval History:  No acute events overnight. Working with speech therapy.        The patient's relevant past medical, surgical and social history were reviewed and updated in Epic as appropriate.      ROS:   Constitutional: Negative for fever.   Respiratory: Negative for dyspnea.   Cardiovascular: Negative for chest pain.   Gastrointestinal: Negative for  nausea, vomiting and diarrhea.     Objective   O     Vitals:  Temp  Min: 97.8 °F (36.6 °C)  Max: 99.3 °F (37.4 °C)  BP  Min: 124/90  Max: 197/78  Pulse  Min: 70  Max: 94  Resp  Min: 16  Max: 24  SpO2  Min: 82 %  Max: 97 % Flow (L/min)  Min: 2  Max: 2    Intake/Ouptut 24 hrs (7:00AM - 6:59 AM)  Intake & Output (last 3 days)       07/29 0701 - 07/30 0700 07/30 0701 - 07/31 0700 07/31 0701 - 08/01 0700 08/01 0701 - 08/02 0700    P.O.   0     I.V. (mL/kg)   272.6 (3.5)     Total Intake(mL/kg)   272.6 (3.5)     Urine (mL/kg/hr)   2600 (1.4)     Emesis/NG output   0     Total Output   2600     Net   -2327.4             Urine Unmeasured Occurrence   2 x     Emesis Unmeasured Occurrence   4 x           Medications (drips):    niCARdipine Last Rate: 5 mg/hr (08/01/20 0957)   phenylephrine Last Rate: Stopped (07/31/20 1359)         Physical Examination  Telemetry:  Normal sinus rhythm.    Constitutional:  No acute distress.  Resting in bed.    Eyes: No scleral icterus.   PERRL, EOM intact.    Neck:  Supple, FROM   Cardiovascular: Normal rate, regular and rhythm. Normal heart sounds.  No murmurs, gallop or rub.   Respiratory: No respiratory distress. Normal respiratory effort.  Normal breath sounds  Clear to ascultation   Abdominal:  Soft. No masses. Non-tender. No distension. No HSM.   Extremities: No digital cyanosis. No clubbing.  No peripheral edema.   Skin: No rashes, lesions or ulcers   Neurological:   Alert and  interactive. Left sided neglect.        Interval: handoff  1a. Level of Consciousness: 1-->Not alert, but arousable by minor stimulation to obey, answer, or respond  1b. LOC Questions: 0-->Answers both questions correctly  1c. LOC Commands: 0-->Performs both tasks correctly  2. Best Gaze: 1-->Partial gaze palsy, gaze is abnormal in one or both eyes, but forced deviation or total gaze paresis is not present  3. Visual: 1-->Partial hemianopia  4. Facial Palsy: 3-->Complete paralysis of one or both sides (absence of facial movement in the upper and lower face)  5a. Motor Arm, Left: 1-->Drift, limb holds 90 (or 45) degrees, but drifts down before full 10 seconds, does not hit bed or other support  5b. Motor Arm, Right: 0-->No drift, limb holds 90 (or 45) degrees for full 10 secs  6a. Motor Leg, Left: 1-->Drift, leg falls by the end of the 5-sec period but does not hit bed  6b. Motor Leg, Right: 0-->No drift, leg holds 30 degree position for full 5 secs  7. Limb Ataxia: 0-->Absent  8. Sensory: 1-->Mild-to-moderate sensory loss, patient feels pinprick is less sharp or is dull on the affected side, or there is a loss of superficial pain with pinprick, but patient is aware of being touched  9. Best Language: 0-->No aphasia, normal  10. Dysarthria: 1-->Mild-to-moderate dysarthria, patient slurs at least some words and, at worst, can be understood with some difficulty  11. Extinction and Inattention (formerly Neglect): 2-->Profound bishop-inattention/extinction more than 1 modality    Total (NIH Stroke Scale): 12       Results from last 7 days   Lab Units 08/01/20  0742 07/31/20  0530 07/31/20  0522   WBC 10*3/mm3 17.40*  --  11.93*   HEMOGLOBIN g/dL 12.8  --  13.8   HEMOGLOBIN, POC g/dL  --  15.6  --    MCV fL 94.3  --  89.9   PLATELETS 10*3/mm3 197  --  234     Results from last 7 days   Lab Units 08/01/20  0742 07/31/20  0523   SODIUM mmol/L 140  --    POTASSIUM mmol/L 4.5  --    CO2 mmol/L 20.0*  --    CREATININE mg/dL  1.81* 1.30   GLUCOSE mg/dL 257*  --    MAGNESIUM mg/dL 2.9*  --      Estimated Creatinine Clearance: 27.1 mL/min (A) (by C-G formula based on SCr of 1.81 mg/dL (H)).  Results from last 7 days   Lab Units 07/31/20  0522   ALT (SGPT) U/L 13   AST (SGOT) U/L 19       Results from last 7 days   Lab Units 07/31/20  0530   PH, ARTERIAL pH units 7.40       Images:    Imaging Results (Last 24 Hours)     Procedure Component Value Units Date/Time    MRI Brain Without Contrast [[id number]] Collected:  08/01/20 0831     Updated:  08/01/20 0852    Narrative:       EXAMINATION: MRI BRAIN WO CONTRAST-     INDICATION: Stroke; I63.9-Cerebral infarction, unspecified;  R47.1-Dysarthria and anarthria left lower facial droop     TECHNIQUE: Routine multiple imaging is obtained of the brain without the  ministration gadolinium contrast.     COMPARISON: NONE     FINDINGS: There is a moderate sized area of abnormal signal seen in the  right temporoparietal region. Findings to suggest an area of acute  ischemia. No evidence of hemorrhage. Some mild chronic small vessel  ischemic changes seen within the brain. Particular system is  unremarkable. Globes and of its are intact. No evidence of  hydrocephalus. No mass, mass effect, midline shift. No abnormal  excessive fluid collections identified. Pituitary and sellar  unremarkable. Cranial vertebral junctions preserved. No several about  mass identified. Globes and of its are intact. Visualized paranasal  sinuses are clear.          Impression:       Moderate size acute ischemic insult in the right temporal  parietal region. Minimal surrounding edema with no significant mass  effect or effacement of the ventricular system. No hemorrhage.           CT Head Without Contrast [[id number]] Collected:  08/01/20 0824     Updated:  08/01/20 0826    Narrative:       EXAMINATION: CT HEAD WO CONTRAST-      INDICATION: Stroke; I63.9-Cerebral infarction, unspecified;  R47.1-Dysarthria and anarthria stroke  symptoms, follow-up CVA     TECHNIQUE: Multiple axial CT imaging is obtained of the head from skull  base to skull vertex without the ministration of intravenous contrast.     The radiation dose reduction device was turned on for each scan per the  ALARA (As Low as Reasonably Achievable) protocol.     COMPARISON: 07/31/2020     FINDINGS: There is an area of low density identified within the right  temporoparietal region suggesting patient's evolving area of infarction.  There is minimal surrounding edema and mass effect. No hemorrhage. No  midline shift. No effacement of the ventricular system. Bony structures  reveal no evidence of osseous abnormality. The visualized paranasal  sinuses are clear. The mastoid air cells are patent.             Impression:       Area of evolving infarction in the right temporoparietal  region. No significant effacement of the ventricular system with minimal  edema and mass effect. No midline shift. No hemorrhagic conversion          CT Head Without Contrast [[id number]] Collected:  07/31/20 0904     Updated:  07/31/20 2356    Narrative:       EXAMINATION: CT HEAD WO CONTRAST- 07/31/2020     INDICATION: Stroke; I63.9-Cerebral infarction, unspecified;  R47.1-Dysarthria and anarthria     TECHNIQUE: 5 mm unenhanced images through the brain     The radiation dose reduction device was turned on for each scan per the  ALARA (As Low as Reasonably Achievable) protocol.     COMPARISON: NONE     FINDINGS: The calvarium appears intact. Included paranasal sinuses and  mastoids appear clear. Soft tissue  images show residual intravascular  contrast from patient's previous angiographic studies earlier this  morning. Allowing for this, there is no evidence of hemorrhage. There is  no evidence of mass mass effect, or well-defined area of cerebral edema.  There is a suggestion of mild loss of gray/white matter differentiation  in the superior left temporal lobe including the upper left insular  region,  axial images 17, 18 and 19 of initial study, image 18 and 19 of  the reconstructed study. There is trace marginal postcontrast  enhancement along the dorsal margin of this region, which may represent  mild capillary leakage, rather than petechial hemorrhage. No potential  edema/infarct is seen elsewhere.       Impression:       Subtle new area of edema in the superior right temporal lobe  and inferior right frontal lobe, corresponding to patient's perfusion  scan abnormality. Trace marginal postcontrast enhancement along the  dorsal margin of this region, or less likely mild petechial hemorrhage.  No new intracranial disease is seen elsewhere.     D:  07/31/2020  E:  07/31/2020     This report was finalized on 7/31/2020 11:53 PM by Dr. Jass Milan MD.       CT Angiogram Head [[id number]] Collected:  07/31/20 0913     Updated:  07/31/20 1219    Narrative:       EXAMINATION: CT ANGIOGRAM HEAD- 07/31/2020     INDICATION: Stroke; I63.9-Cerebral infarction, unspecified;  R47.1-Dysarthria and anarthria     TECHNIQUE: Pre and postcontrast 3 mm and 0.75 mm axial images through  the brain with 2-D and 3-D VRT and MIP angiographic reconstructions.     The radiation dose reduction device was turned on for each scan per the  ALARA (As Low as Reasonably Achievable) protocol.     COMPARISON: Angiographic head CT scan of same date, 5:19 AM     FINDINGS: Current study is timed at 9:04 AM. Perhaps best appreciated on  thin section axial image #220 series 7 is restored flow in the anterior  right M2 branch, compared to the very subtle cut off seen on previous  image 338 series 7. There is again mild decrease in caliber of the  distal right M1 segment, less than 30% narrowing, without focal  stenosis. Remaining anterior and middle cerebral arteries and proximal  branches appear grossly normal. Distal vertebral arteries, basilar  artery, and posterior cerebral arteries appear grossly normal. Posterior  cerebral arteries are primarily  supplied via large posterior  communicating arteries. There is a relatively small basilar artery as a  result. No new intracranial vascular stenosis is identified. Axial  source images again show subtle hypoenhancement of the posterior right  frontal lobe, axial 3 mm image 91 series 902, consistent with an  approximately 4 cm area of mild edema/ischemia.       Impression:       1. Apparent restoration of flow to previously truncated right anterior  temporal branch.  2. Mild narrowing of the distal right M1 branch unchanged.  3. Subtle approximately 4 cm area of hypoenhancement in the posterior  right frontal lobe corresponding to perfusion scan abnormality.     D:  07/31/2020  E:  07/31/2020             Results: Reviewed.  I reviewed the patient's new laboratory and imaging results.  I independently reviewed the patient's new images.    Medications: Reviewed.    Assessment/Plan   A / P     Ms. Myrick is a 81yo F who was admitted for a right MCA CVA. She underwent thrombectomy of a right MCA clot. She was not a candidate for tPA as she was outside the window. She remains on Cardene for blood pressure control.     Nutrition:   NPO Diet  Advance Directives:   Code Status and Medical Interventions:   Ordered at: 07/31/20 0803     Code Status:    CPR     Medical Interventions (Level of Support Prior to Arrest):    Full       Active Hospital Problems    Diagnosis   • **AIS    • H/O grade II follicular lymphoma    • Hypertension   • Dyslipidemia   • Hypothyroidism   • T2DM    • Smoker       Assessment / Plan:    1. Continue post-stroke care.   2. Maintain blood pressure between 140-180 per Neurology recommendations.   3. ASA and statin  4. PT/OT  5. Speech therapy. If she fails, she will need a Keofeed for oral access.   6. Echo negative for PFO  7. AM labs    High level of risk due to:  severe exacerbation of chronic illness and illness with threat to life or bodily function.    Plan of care and goals reviewed during  interdisciplinary rounds.  I discussed the patient's findings and my recommendations with patient, family and nursing staff      Yazmin Heml DO    Intensive Care Medicine and Pulmonary Medicine

## 2020-08-01 NOTE — PLAN OF CARE
Problem: Patient Care Overview  Goal: Plan of Care Review  Outcome: Ongoing (interventions implemented as appropriate)  Flowsheets (Taken 8/1/2020 1233)  Plan of Care Reviewed With: patient; daughter  Note:   SLP evaluation completed. Will continue to address cog-comm difficulties and dysphagia, with FEES to follow. Please see note for further details and recommendations.

## 2020-08-01 NOTE — PLAN OF CARE
Problem: Patient Care Overview  Goal: Plan of Care Review  Flowsheets  Taken 8/1/2020 1614  Plan of Care Reviewed With: patient;daughter  Taken 8/1/2020 1504  Outcome Summary: PT initial evaluation completed. Patient demonstrates left sided weakness, sensory loss and decreased postural control resulting in impaired mobility. Patient able to perform bed mobilty with ModA x1 and transfers and gait to chair with Brigitte x2. Recommend IRF at discharge.

## 2020-08-01 NOTE — THERAPY EVALUATION
Acute Care - Occupational Therapy Initial Evaluation  [facility]     Patient Name: Marquez Myrick  : 1940  MRN: [id number]  Today's Date: 2020  Onset of Illness/Injury or Date of Surgery: 20  Date of Referral to OT: 20  Referring Physician: BRITTNEE Coyle    Admit Date: 2020       ICD-10-CM ICD-9-CM   1. Acute ischemic stroke (CMS/HCC) I63.9 434.91   2. Dysarthria R47.1 784.51   3. Impaired mobility and ADLs Z74.09 V49.89    Z78.9      Patient Active Problem List   Diagnosis   • H/O grade II follicular lymphoma    • Hypertension   • Dyslipidemia   • Hypothyroidism   • T2DM    • AIS    • Smoker     Past Medical History:   Diagnosis Date   • Dyslipidemia 2020   • Hypertension 2020   • Hypothyroidism 2020     Past Surgical History:   Procedure Laterality Date   • APPENDECTOMY     • CHOLECYSTECTOMY     • COLON SURGERY Right     R colon resection    • DEEP NECK LYMPH NODE BIOPSY / EXCISION     • HYSTERECTOMY     • PORTACATH PLACEMENT Left 2016    L subclavian           OT ASSESSMENT FLOWSHEET (last 12 hours)      Occupational Therapy Evaluation     Row Name 20 0918                   OT Evaluation Time/Intention    Subjective Information  no complaints  -JR        Document Type  evaluation  -JR        Mode of Treatment  occupational therapy  -JR        Patient Effort  adequate  -JR        Symptoms Noted During/After Treatment  none  -JR           General Information    Patient Profile Reviewed?  yes  -JR        Onset of Illness/Injury or Date of Surgery  20  -JR        Referring Physician  BRITTNEE Coyle  -JR        Prior Level of Function  independent:;gait;transfer;bed mobility;ADL's  -JR        Equipment Currently Used at Home  -- Pt poor historian and no family present  -JR        Pertinent History of Current Functional Problem  Pt admitted following fall off toiilet due to L sided weakness, also L facial droop and dysarthria. Pt s/p thrombectomy  with post-op worsening of symptoms with R gaze deviation and significant L sided weakness  -JR        Existing Precautions/Restrictions  fall L sided weakness  -JR        Risks Reviewed  patient:;increased discomfort  -JR        Benefits Reviewed  patient:;improve function;increase independence  -JR        Barriers to Rehab  medically complex  -JR           Relationship/Environment    Lives With  alone  -JR           Cognitive Assessment/Interventions    Additional Documentation  Cognitive Assessment/Intervention (Group)  -JR           Cognitive Assessment/Intervention- PT/OT    Affect/Mental Status (Cognitive)  low arousal/lethargic slurred speech  -JR        Orientation Status (Cognition)  oriented to;person;place;time  -JR        Follows Commands (Cognition)  follows one step commands;75-90% accuracy;verbal cues/prompting required  -JR        Safety Deficit (Cognitive)  moderate deficit;awareness of need for assistance;insight into deficits/self awareness  -JR           Bed Mobility Assessment/Treatment    Comment (Bed Mobility)  Defer-pt with decreased alert level this date.  -JR           General ROM    GENERAL ROM COMMENTS  B UE ROM WFL  -JR           MMT (Manual Muscle Testing)    General MMT Comments  R UE functionally 4-/5, L UE functionally 3-/5, L  0/5  -JR           Motor Assessment/Interventions    Muscle Tone Assessment  LUE  -JR        LUE Muscle Tone Assessment  mildly increased tone L upper arm  -JR        Additional Documentation  Fine Motor Testing & Training (Group);Gross Motor Coordination (Group);Therapeutic Exercise (Group);Therapeutic Exercise Interventions (Group);Muscle Tone Assessment (Row)  -JR           Therapeutic Exercise    Upper Extremity Range of Motion (Therapeutic Exercise)  shoulder flexion/extension, bilateral;shoulder abduction/adduction, bilateral;elbow flexion/extension, bilateral;forearm supination/pronation, bilateral;wrist flexion/extension, bilateral  -JR        Hand  (Therapeutic Exercise)  hand , bilateral  -JR        Exercise Type (Therapeutic Exercise)  AROM (active range of motion);PROM (passive range of motion) R UE AROM, L UE PROM  -JR        Position (Therapeutic Exercise)  supine  -JR        Sets/Reps (Therapeutic Exercise)  1/10  -JR           Gross Motor Coordination    Gross Motor Skill, Impairments Detail  Impaired L UE GMC  -JR           Fine Motor Testing & Training    Comment, Fine Motor Coordination  Impaired L UE FMC  -JR           Sensory Assessment/Intervention    Sensory General Assessment  -- Unable to assess this date due to cognitive status  -JR        Additional Documentation  Vision Assessment/Intervention (Group)  -JR           Vision Assessment/Intervention    Visual Motor Impairment  visual tracking, left Pt able to track to the L with cues  -JR        Visual Processing Deficit  bishop-inattention/neglect, left Pt keeping head and eyes to the R  -JR           Positioning and Restraints    Pre-Treatment Position  in bed  -JR        Post Treatment Position  bed  -JR        In Bed  notified nsg;supine;call light within reach;encouraged to call for assist;RUE elevated;LUE elevated  -           Pain Assessment    Additional Documentation  Pain Scale: FACES Pre/Post-Treatment (Group)  -           Pain Scale: FACES Pre/Post-Treatment    Pain: FACES Scale, Pretreatment  0-->no hurt  -JR        Pain: FACES Scale, Post-Treatment  0-->no hurt  -JR           Plan of Care Review    Plan of Care Reviewed With  patient  -JR           Clinical Impression (OT)    Date of Referral to OT  07/31/20  -JR        OT Diagnosis  Decreased independence with ADL's and mobility  -        Patient/Family Goals Statement (OT Eval)  Pt unable to state goal this date  -JR        Criteria for Skilled Therapeutic Interventions Met (OT Eval)  yes;treatment indicated  -JR        Rehab Potential (OT Eval)  fair, will monitor progress closely  -JR        Therapy Frequency (OT Eval)   daily  -JR        Care Plan Review (OT)  risks/benefits reviewed;patient/other agree to care plan  -JR        Anticipated Discharge Disposition (OT)  inpatient rehabilitation facility  -JR           Vital Signs    Pre Systolic BP Rehab  186  -JR        Pre Treatment Diastolic BP  70  -JR        Post Systolic BP Rehab  191  -JR        Post Treatment Diastolic BP  78  -JR        Pretreatment Heart Rate (beats/min)  77  -JR        Posttreatment Heart Rate (beats/min)  78  -JR        Pre SpO2 (%)  97  -JR        O2 Delivery Pre Treatment  supplemental O2  -JR        Post SpO2 (%)  96  -JR        O2 Delivery Post Treatment  supplemental O2  -JR        Pre Patient Position  Supine  -JR        Intra Patient Position  Supine  -JR        Post Patient Position  Supine  -JR           Planned OT Interventions    Planned Therapy Interventions (OT Eval)  activity tolerance training;adaptive equipment training;BADL retraining;cognitive/visual perception retraining;functional balance retraining;neuromuscular control/coordination retraining;occupation/activity based interventions;passive ROM/stretching;patient/caregiver education/training;ROM/therapeutic exercise;strengthening exercise;transfer/mobility retraining  -JR           OT Goals    Bed Mobility Goal Selection (OT)  bed mobility, OT goal 1  -JR        Strength Goal Selection (OT)  strength, OT goal 1  -JR        Balance Goal Selection (OT)  balance, OT goal 1  -JR        Problem Specific Goal Selection (OT)  problem specific goal 1, OT  -JR        Additional Documentation  Strength Goal Selection (OT) (Row);Balance Goal Selection (OT) (Row);Problem Specific Goal Selection (OT) (Row)  -JR           Bed Mobility Goal 1 (OT)    Activity/Assistive Device (Bed Mobility Goal 1, OT)  sit to supine/supine to sit  -JR        [place] Level/Cues Needed (Bed Mobility Goal 1, OT)  moderate assist (50-74% patient effort);verbal cues required  -JR        Time Frame (Bed Mobility Goal  1, OT)  long term goal (LTG);1 week  -JR        Progress/Outcomes (Bed Mobility Goal 1, OT)  goal ongoing  -JR           Strength Goal 1 (OT)    Strength Goal 1 (OT)  Pt to increase L UE strength by 1/2 muscle grade to support ADL indepedence.  -JR        Time Frame (Strength Goal 1, OT)  long term goal (LTG);1 week  -JR        Progress/Outcome (Strength Goal 1, OT)  goal ongoing  -JR           Balance Goal 1 (OT)    Activity/Assistive Device (Balance Goal 1, OT)  sitting, static  -JR        [place] Level/Cues Needed (Balance Goal 1, OT)  moderate assist (50-74% patient effort);verbal cues required  -JR        Time Frame (Balance Goal 1, OT)  long term goal (LTG);1 week  -JR        Progress/Outcomes (Balance Goal 1, OT)  goal ongoing  -JR           Problem Specific Goal 1 (OT)    Problem Specific Goal 1 (OT)  Pt to attend to stimuli on the L with mod verbal cues for support ADL independence.  -JR        Time Frame (Problem Specific Goal 1, OT)  long term goal (LTG);1 week  -JR        Progress/Outcome (Problem Specific Goal 1, OT)  goal ongoing  -JR           OT Cognitive Goals    Attention Goal Selection (OT)  attention, OT goal 1  -JR           Living Environment    Home Accessibility  -- Pt poor historian and no family present  -          User Key  (r) = Recorded By, (t) = Taken By, (c) = Cosigned By    Initials Name Effective Dates    JR Liudmila, Veronica KELLEY, OT 06/22/15 -          Occupational Therapy Education                 Title: PT OT SLP Therapies (In Progress)     Topic: Occupational Therapy (In Progress)     Point: ADL training (Not Started)     Description:   Instruct learner(s) on proper safety adaptation and remediation techniques during self care or transfers.   Instruct in proper use of assistive devices.              Learner Progress:   Not documented in this visit.          Point: Home exercise program (In Progress)     Description:   Instruct learner(s) on appropriate technique for  monitoring, assisting and/or progressing therapeutic exercises/activities.              Learning Progress Summary           Patient Acceptance, E, NR by  at 8/1/2020 0918    Comment:  Educated pt regarding role of therapy and ongoing treatment plan                   Point: Precautions (Not Started)     Description:   Instruct learner(s) on prescribed precautions during self-care and functional transfers.              Learner Progress:   Not documented in this visit.          Point: Body mechanics (Not Started)     Description:   Instruct learner(s) on proper positioning and spine alignment during self-care, functional mobility activities and/or exercises.              Learner Progress:   Not documented in this visit.                      User Key     Initials Effective Dates Name Provider Type Discipline     06/22/15 -  Liudmila, Veronica KELLEY, OT Occupational Therapist OT                  OT Recommendation and Plan  Outcome Summary/Treatment Plan (OT)  Anticipated Discharge Disposition (OT): inpatient rehabilitation facility  Planned Therapy Interventions (OT Eval): activity tolerance training, adaptive equipment training, BADL retraining, cognitive/visual perception retraining, functional balance retraining, neuromuscular control/coordination retraining, occupation/activity based interventions, passive ROM/stretching, patient/caregiver education/training, ROM/therapeutic exercise, strengthening exercise, transfer/mobility retraining  Therapy Frequency (OT Eval): daily  Plan of Care Review  Plan of Care Reviewed With: patient  Plan of Care Reviewed With: patient  Outcome Summary: OT initial eval and expanded chart review completed. Pt presents with multiple comorbidities and decreased independence with ADL's and mobility. Recommend continued skilled OT services and IRF at d/c.    Outcome Measures     Row Name 08/01/20 0918             How much help from another is currently needed...    Putting on and taking off  regular lower body clothing?  1  -JR      Bathing (including washing, rinsing, and drying)  1  -JR      Toileting (which includes using toilet bed pan or urinal)  1  -JR      Putting on and taking off regular upper body clothing  1  -JR      Taking care of personal grooming (such as brushing teeth)  1  -JR      Eating meals  1  -JR      AM-PAC 6 Clicks Score (OT)  6  -JR         Modified Mayes Scale    Modified Mayes Scale  5 - Severe disability.  Bedridden, incontinent, and requiring constant nursing care and attention.  -JR         Functional Assessment    Outcome Measure Options  AM-PAC 6 Clicks Daily Activity (OT);Modified Mayes  -        User Key  (r) = Recorded By, (t) = Taken By, (c) = Cosigned By    Initials Name Provider Type    Veronica Echols OT Occupational Therapist          Time Calculation:   Time Calculation- OT     Row Name 08/01/20 0918             Time Calculation- OT    OT Start Time  0918  -      OT Received On  08/01/20  -      OT Goal Re-Cert Due Date  08/11/20  -        User Key  (r) = Recorded By, (t) = Taken By, (c) = Cosigned By    Initials Name Provider Type    Veronica Echols OT Occupational Therapist        Therapy Charges for Today     Code Description Service Date Service Provider Modifiers Qty    [id number]  OT EVAL LOW COMPLEXITY 4 8/1/2020 Veronica Nur OT GO 1               Veronica Nur OT  8/1/2020

## 2020-08-01 NOTE — THERAPY EVALUATION
Acute Care - Speech Language Pathology Initial Evaluation  [facility]   Cognitive-Communication Evaluation  Clinical Swallow Evaluation     Patient Name: Marquez Myrick  : 1940  MRN: [id number]  Today's Date: 2020  Onset of Illness/Injury or Date of Surgery: 20     Referring Physician: BRITTNEE Coyle      Admit Date: 2020     Visit Dx:    ICD-10-CM ICD-9-CM   1. Acute ischemic stroke (CMS/HCC) I63.9 434.91   2. Dysarthria R47.1 784.51   3. Impaired mobility and ADLs Z74.09 V49.89    Z78.9    4. Dysphagia, unspecified type R13.10 787.20   5. Cognitive communication deficit R41.841 799.52     Patient Active Problem List   Diagnosis   • H/O grade II follicular lymphoma    • Hypertension   • Dyslipidemia   • Hypothyroidism   • T2DM    • AIS    • Smoker     Past Medical History:   Diagnosis Date   • Dyslipidemia 2020   • Hypertension 2020   • Hypothyroidism 2020     Past Surgical History:   Procedure Laterality Date   • APPENDECTOMY     • CHOLECYSTECTOMY     • COLON SURGERY Right 2005    R colon resection    • DEEP NECK LYMPH NODE BIOPSY / EXCISION     • HYSTERECTOMY     • PORTACATH PLACEMENT Left 2016    L subclavian         SLP EVALUATION (last 72 hours)      SLP SLC Evaluation     Row Name 20 0800                   General Information    Prior Level of Function-Communication  WFL  -SM        Patient's Goals for Discharge  patient did not state  -SM        Family Goals for Discharge  family did not state  -SM           Comprehension Assessment/Intervention    Comprehension Assessment/Intervention  Auditory Comprehension  -SM           Auditory Comprehension Assessment/Intervention    Answers Questions (Communication)  yes/no;wh questions;personal;simple;mild impairment  -SM        Able to Follow Commands (Communication)  1-step;mild impairment  -SM           Expression Assessment/Intervention    Expression Assessment/Intervention  verbal expression  -SM            Verbal Expression Assessment/Intervention    Conversational Discourse/Fluency  mild impairment  -SM           Motor Speech Assessment/Intervention    Motor Speech Function  mild impairment;moderate impairment  -        Characteristics Consistent with Dysarthria  decreased articulation  -           Cognitive Assessment Intervention- SLP    Orientation Status (Cognition)  person;place;time;situation;WFL  -        Right Hemisphere Function  left neglect  -        Cognition, Comment  Lethargy impacting full assessment. When alert, comprehension (simple level, DNT higher level) funcitonal. Cues needed to maintain alertness to task. Slow to respond at times  -           SLP Clinical Impressions    SLP Diagnosis  Moderate cog-comm deficits, lethargy at least partially contributing  -        Rehab Potential/Prognosis  good  -SM        SLC Criteria for Skilled Therapy Interventions Met  yes  -        Functional Impact  difficulty completing home management task  -           Recommendations    Therapy Frequency (SLP SLC)  5 days per week  -        Predicted Duration Therapy Intervention (Days)  until discharge  -        Anticipated Dischage Disposition (SLP)  inpatient rehabilitation facility;anticipate therapy at next level of care  -          User Key  (r) = Recorded By, (t) = Taken By, (c) = Cosigned By    Initials Name Effective Dates    Lisseth Martinez MS CCC-SLP 06/22/15 -              EDUCATION  The patient has been educated in the following areas:     Cognitive Impairment Communication Impairment.    SLP Recommendation and Plan  SLP Diagnosis: Moderate cog-comm deficits, lethargy at least partially contributing     [facility] [place] for Skilled Therapy Interventions Met: yes  Anticipated Dischage Disposition (SLP): inpatient rehabilitation facility, anticipate therapy at next level of care     Predicted Duration Therapy Intervention (Days): until discharge    Plan of Care Reviewed With:  patient, daughter      SLP GOALS     Row Name 08/01/20 0800             Articulation Goal 1 (SLP)    Improve Articulation Goal 1 (SLP)  by over-articulating at word level;80%;with minimal cues (75-90%)  -SM      Time Frame (Articulation Goal 1, SLP)  short term goal (STG)  -SM         Attention Goal 1 (SLP)    Improve Attention by Goal 1 (SLP)  complete sustained attention task;100%;with minimal cues (75-90%)  -SM      Time Frame (Attention Goal 1, SLP)  short term goal (STG)  -SM         Right Hemisphere Function Goal 1 (SLP)    Improve Right Hemisphere Function Through Goal 1 (SLP)  demonstrate awareness of communication partner in left visual field;use compensatory strategies for left neglect;identify physical/cognitive strengths and limitations;70%;with moderate cues (50-74%)  -SM      Time Frame (Right Hemisphere Function Goal 1, SLP)  short term goal (STG)  -SM         Additional Goal 1 (SLP)    Additional Goal 1, SLP  LTG: Improve cog-comm skills in order to participate in care while in hospital setting with 100% accuracy and min cues  -        User Key  (r) = Recorded By, (t) = Taken By, (c) = Cosigned By    Initials Name Provider Type    Lisseth Martinez MS CCC-SLP Speech and Language Pathologist                  Time Calculation:     Time Calculation- SLP     Row Name 08/01/20 1234             Time Calculation- SLP    SLP Start Time  0800  -      SLP Received On  08/01/20  -        User Key  (r) = Recorded By, (t) = Taken By, (c) = Cosigned By    Initials Name Provider Type    Lisseth Martinez MS CCC-SLP Speech and Language Pathologist          Therapy Charges for Today     Code Description Service Date Service Provider Modifiers Qty    23275401997 HC ST EVAL ORAL PHARYNG SWALLOW 3 8/1/2020 Lisseth Suh MS CCC-SLP GN 1    34652372326 HC ST EVAL SPEECH AND PROD W LANG  2 8/1/2020 Lisseth Suh MS CCC-SLP GN 1                     Lisseth Suh MS  CCC-SLP  2020 and Acute Care - Speech Language Pathology   Swallow Initial Evaluation [facility]     Patient Name: Marquez Myrick  : 1940  MRN: [id number]  Today's Date: 2020  Onset of Illness/Injury or Date of Surgery: 20     Referring Physician: BRITTNEE Coyle      Admit Date: 2020    Visit Dx:     ICD-10-CM ICD-9-CM   1. Acute ischemic stroke (CMS/HCC) I63.9 434.91   2. Dysarthria R47.1 784.51   3. Impaired mobility and ADLs Z74.09 V49.89    Z78.9    4. Dysphagia, unspecified type R13.10 787.20   5. Cognitive communication deficit R41.841 799.52     Patient Active Problem List   Diagnosis   • H/O grade II follicular lymphoma    • Hypertension   • Dyslipidemia   • Hypothyroidism   • T2DM    • AIS    • Smoker     Past Medical History:   Diagnosis Date   • Dyslipidemia 2020   • Hypertension 2020   • Hypothyroidism 2020     Past Surgical History:   Procedure Laterality Date   • APPENDECTOMY     • CHOLECYSTECTOMY     • COLON SURGERY Right     R colon resection    • DEEP NECK LYMPH NODE BIOPSY / EXCISION     • HYSTERECTOMY     • PORTACATH PLACEMENT Left     L subclavian         SWALLOW EVALUATION (last 72 hours)      SLP Adult Swallow Evaluation     Row Name 20 0800                   Rehab Evaluation    Document Type  evaluation  -SM        Subjective Information  no complaints  -SM        Patient Observations  lethargic;cooperative  -SM        Patient/Family Observations  Dtr present  -SM           General Information    Patient Profile Reviewed  yes  -SM        Pertinent History Of Current Problem  R frontal infarct, s/p mechanical thrombectomy  -SM        Current Method of Nutrition  NPO  -SM        Prior Level of Function-Swallowing  safe, efficient swallowing in all situations  -SM        Plans/Goals Discussed with  patient and family;agreed upon  -SM        Barriers to Rehab  none identified  -SM        Patient's Goals for Discharge  return to PO  diet  -SM        Family Goals for Discharge  patient able to return to PO diet;patient able to eat/drink without coughing/choking  -           Pain Assessment    Additional Documentation  Pain Scale: Numbers Pre/Post-Treatment (Group)  -SM           Pain Scale: Numbers Pre/Post-Treatment    Pain Scale: Numbers, Pretreatment  0/10 - no pain  -SM        Pain Scale: Numbers, Post-Treatment  0/10 - no pain  -SM           Oral Musculature and Cranial Nerve Assessment    Oral Labial or Buccal Impairment, Detail, Cranial Nerve VII (Facial):  left labial droop  -SM        Lingual Impairment, Detail. Cranial Nerves IX, XII (Glossopharyngeal and Hypoglossal)  reduced strength left  -           Clinical Swallow Eval    Oral Prep Phase  impaired  -SM        Oral Transit  impaired  -SM        Pharyngeal Phase  suspected pharyngeal impairment  -SM           Oral Prep Concerns    Oral Prep Concerns  prolonged mastication;inefficient mastication  -           Oral Transit Concerns    Oral Transit Concerns  increased oral transit time  -SM           Pharyngeal Phase Concerns    Pharyngeal Phase Concerns  cough  -SM        Cough  thin;other (see comments) with large sip and as liquid wash  -           Clinical Impression    SLP Swallowing Diagnosis  moderate;oral dysfunction;suspected pharyngeal dysfunction  -        Functional Impact  risk of aspiration/pneumonia  -           Recommendations    SLP Diet Recommendation  NPO;other (see comments) ok for ice chips  -        Recommended Diagnostics  VFSS (MBS)  -        Recommended Precautions and Strategies  upright posture during/after eating  -        SLP Rec. for Method of Medication Administration  meds whole;meds crushed;with pudding or applesauce  -          User Key  (r) = Recorded By, (t) = Taken By, (c) = Cosigned By    Initials Name Effective Dates     Lisseth Suh MS CCC-SLP 06/22/15 -           EDUCATION  The patient has been educated in the  following areas:   Dysphagia (Swallowing Impairment) Oral Care/Hydration NPO rationale.    SLP Recommendation and Plan  SLP Swallowing Diagnosis: moderate, oral dysfunction, suspected pharyngeal dysfunction  SLP Diet Recommendation: NPO, other (see comments)(ok for ice chips)  Recommended Precautions and Strategies: upright posture during/after eating  SLP Rec. for Method of Medication Administration: meds whole, meds crushed, with pudding or applesauce        Recommended Diagnostics: VFSS (MBS)     Anticipated Dischage Disposition (SLP): inpatient rehabilitation facility, anticipate therapy at next level of care        Predicted Duration Therapy Intervention (Days): until discharge       Plan of Care Reviewed With: patient, daughter    SLP GOALS     Row Name 08/01/20 0800             Articulation Goal 1 (SLP)    Improve Articulation Goal 1 (SLP)  by over-articulating at word level;80%;with minimal cues (75-90%)  -SM      Time Frame (Articulation Goal 1, SLP)  short term goal (STG)  -SM         Attention Goal 1 (SLP)    Improve Attention by Goal 1 (SLP)  complete sustained attention task;100%;with minimal cues (75-90%)  -SM      Time Frame (Attention Goal 1, SLP)  short term goal (STG)  -SM         Right Hemisphere Function Goal 1 (SLP)    Improve Right Hemisphere Function Through Goal 1 (SLP)  demonstrate awareness of communication partner in left visual field;use compensatory strategies for left neglect;identify physical/cognitive strengths and limitations;70%;with moderate cues (50-74%)  -SM      Time Frame (Right Hemisphere Function Goal 1, SLP)  short term goal (STG)  -SM         Additional Goal 1 (SLP)    Additional Goal 1, SLP  LTG: Improve cog-comm skills in order to participate in care while in hospital setting with 100% accuracy and min cues  -SM        User Key  (r) = Recorded By, (t) = Taken By, (c) = Cosigned By    Initials Name Provider Type    Lisseth Martinez MS CCC-SLP Speech and Language  Pathologist             Time Calculation:   Time Calculation- SLP     Row Name 08/01/20 1234             Time Calculation- SLP    SLP Start Time  0800  -      SLP Received On  08/01/20  -        User Key  (r) = Recorded By, (t) = Taken By, (c) = Cosigned By    Initials Name Provider Type    Lisseth Martinez MS CCC-SLP Speech and Language Pathologist          Therapy Charges for Today     Code Description Service Date Service Provider Modifiers Qty    42436948708 HC ST EVAL ORAL PHARYNG SWALLOW 3 8/1/2020 Lisseth Suh MS CCC-SLP GN 1    52739037497 HC ST EVAL SPEECH AND PROD W LANG  2 8/1/2020 Lisseth Suh MS CCC-SLP GN 1            Patient was not wearing a face mask and did exhibit coughing during this therapy encounter.  Procedure performed was aerosolizing, involved close contact (within 6 feet for at least 15 minutes or longer), and did not involve contact with infectious secretions or specimens.  Therapist used appropriate personal protective equipment including gloves, standard procedure mask and eye protection.  Appropriate PPE was worn during the entire therapy session.  Hand hygiene was completed before and after therapy session.       MS BJ Casillas  8/1/2020

## 2020-08-01 NOTE — PLAN OF CARE
Problem: Patient Care Overview  Goal: Plan of Care Review  8/1/2020 1601 by Lisseth Suh MS CCC-SLP  Outcome: Ongoing (interventions implemented as appropriate)  Flowsheets (Taken 8/1/2020 1601)  Plan of Care Reviewed With: patient; son  Note:   SLP re-evaluation completed. Safe for modified PO diet until FEES can be completed tomorrow, 8/2. Please see note for further details and recommendations.

## 2020-08-01 NOTE — PROGRESS NOTES
Neuro Interventional Follow-Up Note    HOD # (1)  CVA S/P thrombectomy for right M2 occlusion     No events last night  The patient continues to be lethargic however she awakens easily and follows commands.  Her neurological exam has improved since yesterday.  Daughter is currently at the bedside.      AIS     H/O grade II follicular lymphoma     Hypertension    Dyslipidemia    Hypothyroidism    T2DM     Smoker      Temp:  [97.8 °F (36.6 °C)-99.3 °F (37.4 °C)] 99.3 °F (37.4 °C)  Heart Rate:  [70-94] 80  Resp:  [16-24] 20  BP: (124-197)/() 197/78      Intake/Output Summary (Last 24 hours) at 8/1/2020 1133  Last data filed at 8/1/2020 0600  Gross per 24 hour   Intake 83.6 ml   Output 1800 ml   Net -1716.4 ml       Vital signs, labs, and pertinent tests were reviewed.    24 hour post-thrombectomy CT scan reveals evolving right frontoparietal infarct, negative for hemorrhage.    Echocardiogram: EF 55%, negative for left atrial enlargement and/or PFO. Patient remains in NSR (with PVCs) on cardiac monitor - no documented atrial fibrillation.    Total Cholesterol 135, LDL 52, HDL 57    Last Documented NIHSS  Interval: handoff  1a. Level of Consciousness: 1-->Not alert, but arousable by minor stimulation to obey, answer, or respond  1b. LOC Questions: 0-->Answers both questions correctly  1c. LOC Commands: 0-->Performs both tasks correctly     3. Visual: 1-->Partial hemianopia  4. Facial Palsy: 3-->Complete paralysis of one or both sides (absence of facial movement in the upper and lower face)  5a. Motor Arm, Left: 1-->Drift, limb holds 90 (or 45) degrees, but drifts down before full 10 seconds, does not hit bed or other support  5b. Motor Arm, Right: 0-->No drift, limb holds 90 (or 45) degrees for full 10 secs  6a. Motor Leg, Left: 1-->Drift, leg falls by the end of the 5-sec period but does not hit bed  6b. Motor Leg, Right: 0-->No drift, leg holds 30 degree position for full 5 secs  7. Limb Ataxia: 0-->Absent  8.  Sensory: 1-->Mild-to-moderate sensory loss, patient feels pinprick is less sharp or is dull on the affected side, or there is a loss of superficial pain with pinprick, but patient is aware of being touched  9. Best Language: 0-->No aphasia, normal  10. Dysarthria: 1-->Mild-to-moderate dysarthria, patient slurs at least some words and, at worst, can be understood with some difficulty  11. Extinction and Inattention (formerly Neglect): 2-->Profound bishop-inattention/extinction more than 1 modality    Total (NIH Stroke Scale): 12    EXAM    The patient is drowsy but awakens easily to verbal stimuli.  She is oriented and follows commands.  Pupils are equal and round.  She continues to have partial gaze palsy but is able to cross midline with encouragement.  Left homonymous hemianopia is still present but improved.  Left facial droop is present.  Her speech is dysarthric with no evidence of aphasia.  She has FROM of her right upper and lower extremity.  She is able to lift her left upper and lower extremity against gravity and sustain without drift (upper extremity more difficult than lower extremity).  Decreased sensation present in left upper and lower extremity.  Left sensory neglect is still present.  She denies pain at this time.    PLAN    #1.  Right middle cerebral artery stroke.  Could be atheroembolic from her significant atherosclerotic disease extracranially, versus cardioembolic given her advancing age.  Echocardiogram is negative for left atrial enlargement and/or PFO.  24-hour CT head is negative for hemorrhage. Will start her on Plavix 75 mg in addition to her ASA 325mg.   #2.  Essential hypertension.  Continue permissive hypertension 140-180 for now; patient's symptoms are better with increased blood pressures.  May be able to start normalizing BP tomorrow.  #3.  Cerebral atherosclerosis.  Patient has significant atherosclerotic disease extracranially more than intracranially.  DAPT and high intensity  statin.  #4.  Diabetes mellitus type 2.  A1c 7.3.  Management per Intensivist.  Diabetes educator to see.  #5.  Smoking.  Discussed smoking cessation with family yesterday.  Will  patient when she is more awake.  #5.  Activity.  Ok for PT/OT to work with patient.  #6.  Diet. Patient currently NPO. Scheduled to have FEES later today.    I have reviewed the patient's imaging and discussed the plan of care with the patients daughter who is currently at the bedside.  She is in agreement and all questions have been answered.    I have discussed the patient with Dr. Reeder.  Orders have been placed on his behalf.     Marcia Aragon, RN Extender/Stroke Navigator

## 2020-08-01 NOTE — PLAN OF CARE
MRI and morning CT completed. NIH ranged from 15 to 16. Patient very lethargic. Will follow commands and is oriented x 4. Has a left facial droop, left field cut, left neglect, has severe sensation loss on left extremities and withdrawls from left upper extremity. Patient is dysarthric. Rt. groin site is C/D/I and soft. No drainage. Urine output adequate. No nausea or vomiting throughout night. SBP ranged from 140 to 180. VSS.

## 2020-08-01 NOTE — THERAPY EVALUATION
Patient Name: Marquez Myrick  : 1940    MRN: [id number]                              Today's Date: 2020       Admit Date: 2020    Visit Dx:     ICD-10-CM ICD-9-CM   1. Acute ischemic stroke (CMS/HCC) I63.9 434.91   2. Dysarthria R47.1 784.51   3. Impaired mobility and ADLs Z74.09 V49.89    Z78.9    4. Dysphagia, unspecified type R13.10 787.20   5. Cognitive communication deficit R41.841 799.52     Patient Active Problem List   Diagnosis   • H/O grade II follicular lymphoma    • Hypertension   • Dyslipidemia   • Hypothyroidism   • T2DM    • AIS    • Smoker     Past Medical History:   Diagnosis Date   • Dyslipidemia 2020   • Hypertension 2020   • Hypothyroidism 2020     Past Surgical History:   Procedure Laterality Date   • APPENDECTOMY     • CHOLECYSTECTOMY     • COLON SURGERY Right     R colon resection    • DEEP NECK LYMPH NODE BIOPSY / EXCISION     • HYSTERECTOMY     • PORTACATH PLACEMENT Left     L subclavian      General Information     Row Name 20 1504          PT Evaluation Time/Intention    Document Type  evaluation  -ES     Mode of Treatment  individual therapy;physical therapy  -ES     Row Name 20 1504          General Information    Patient Profile Reviewed?  yes  -ES     Prior Level of Function  independent:;all household mobility;community mobility;gait;transfer;bed mobility;ADL's  -ES     Existing Precautions/Restrictions  fall;oxygen therapy device and L/min  -ES     Barriers to Rehab  medically complex;impaired sensation  -ES     Row Name 20 1504          Relationship/Environment    Lives With  alone  -ES     Row Name 20 1504          Resource/Environmental Concerns    Current Living Arrangements  home/apartment/condo  -ES     Row Name 20 1504          Cognitive Assessment/Intervention- PT/OT    Orientation Status (Cognition)  oriented x 4  -ES     Cognitive Assessment/Intervention Comment  Patient lethargic but able to be  aroused to participate in PT Evaluation.   -ES     Row Name 08/01/20 1504          Safety Issues, Functional Mobility    Safety Issues Affecting Function (Mobility)  impulsivity;insight into deficits/self awareness;safety precautions follow-through/compliance;safety precaution awareness  -ES     Impairments Affecting Function (Mobility)  balance;motor control;postural/trunk control;sensation/sensory awareness;strength  -ES       User Key  (r) = Recorded By, (t) = Taken By, (c) = Cosigned By    Initials Name Provider Type    ES Misti Yañez, PT Physical Therapist        Mobility     Row Name 08/01/20 1504          Bed Mobility Assessment/Treatment    Bed Mobility Assessment/Treatment  rolling left;supine-sit  -ES     Rolling Left [place] (Bed Mobility)  moderate assist (50% patient effort);1 person assist;verbal cues  -ES     Supine-Sit [place] (Bed Mobility)  moderate assist (50% patient effort);1 person assist;verbal cues  -ES     Assistive Device (Bed Mobility)  head of bed elevated;draw sheet  -ES     Row Name 08/01/20 1504          Bed-Chair Transfer    Bed-Chair [place] (Transfers)  minimum assist (75% patient effort);2 person assist;verbal cues  -ES     Row Name 08/01/20 1504          Sit-Stand Transfer    Sit-Stand [place] (Transfers)  minimum assist (75% patient effort);2 person assist;verbal cues  -ES     Row Name 08/01/20 1504          Gait/Stairs Assessment/Training    Gait/Stairs Assessment/Training  gait/ambulation independence  -ES     [place] Level (Gait)  minimum assist (75% patient effort);verbal cues;2 person assist  -ES     Distance in Feet (Gait)  ~3 feet bed to chair   -ES     Pattern (Gait)  2-point  -ES     Deviations/Abnormal Patterns (Gait)  left sided deviations;bilateral deviations  -ES     Bilateral Gait Deviations  forward flexed posture;heel strike decreased  -ES     Left Sided Gait Deviations  knee buckling, left side;weight shift ability decreased  -ES      Comment (Gait/Stairs)  Patient performed bed to chair transfer via stepping with Brigitte x2 and verbal cues for upright posture and lateral weight-shift.   -ES       User Key  (r) = Recorded By, (t) = Taken By, (c) = Cosigned By    Initials Name Provider Type    Misti Donato PT Physical Therapist        Obj/Interventions     Row Name 08/01/20 1504          General ROM    GENERAL ROM COMMENTS  Right UE/LE WNL. Left UE: WFL passively. Left LE: WFL.   -ES     Row Name 08/01/20 1504          MMT (Manual Muscle Testing)    General MMT Comments  Right UE/LE: WFL. Left UE: shoulder 3-/5, elbow 4-/5,  1/5. Left LE: hip 4-/5, knee 3+/5, ankle 4/5.   -ES     Row Name 08/01/20 1504          Therapeutic Exercise    Comment (Therapeutic Exercise)  Reviewed seated/supine HEP with patient's daughter to include: AAROM ankle pumps, heel slides, long arc quads, hip abduction x5-10 reps, 4-5x daily.   -ES     Row Name 08/01/20 1504          Static Sitting Balance    Level of [place] (Unsupported Sitting, Static Balance)  moderate assist, 50 to 74% patient effort;1 person assist  -ES     Sitting Position (Unsupported Sitting, Static Balance)  sitting on edge of bed  -ES     Time Able to Maintain Position (Unsupported Sitting, Static Balance)  3 to 4 minutes  -ES     Comment (Unsupported Sitting, Static Balance)  Patient able to maintain static sitting balance with CGA x1 when given RUE support.   -ES     Row Name 08/01/20 1504          Dynamic Sitting Balance    Level of [place], Reaches Outside Midline (Sitting, Dynamic Balance)  moderate assist, 50 to 74% patient effort;1 person assist  -ES     Sitting Position, Reaches Outside Midline (Sitting, Dynamic Balance)  sitting on edge of bed  -ES     Row Name 08/01/20 1504          Static Standing Balance    Level of [place] (Supported Standing, Static Balance)  minimal assist, 75% patient effort;2 person assist  -ES     Time Able to Maintain Position (Supported  Standing, Static Balance)  45 to 60 seconds  -ES     Row Name 08/01/20 1504          Dynamic Standing Balance    Level of [place], Reaches Outside Midline (Standing, Dynamic Balance)  minimal assist, 75% patient effort;2 person assist  -ES     Time Able to Maintain Position, Reaches Outside Midline (Standing, Dynamic Balance)  30 to 45 seconds  -ES     Row Name 08/01/20 1504          Sensory Assessment/Intervention    Sensory General Assessment  -- Right side extremities normal. Left UE: inconsistent light touch (moderate impairment). Left LE: mild impairment. +Withdraw to pain x4 extremities.   -ES       User Key  (r) = Recorded By, (t) = Taken By, (c) = Cosigned By    Initials Name Provider Type    ES Short, Misti, PT Physical Therapist        Goals/Plan     Row Name 08/01/20 1503          Bed Mobility Goal 1 (PT)    Activity/Assistive Device (Bed Mobility Goal 1, PT)  sit to supine/supine to sit  -ES     [place] Level/Cues Needed (Bed Mobility Goal 1, PT)  standby assist;1 person assist  -ES     Time Frame (Bed Mobility Goal 1, PT)  long term goal (LTG);10 days  -ES     Progress/Outcomes (Bed Mobility Goal 1, PT)  goal ongoing  -ES     Row Name 08/01/20 1502          Transfer Goal 1 (PT)    Activity/Assistive Device (Transfer Goal 1, PT)  sit-to-stand/stand-to-sit;bed-to-chair/chair-to-bed  -ES     [place] Level/Cues Needed (Transfer Goal 1, PT)  contact guard assist;1 person assist  -ES     Time Frame (Transfer Goal 1, PT)  long term goal (LTG);10 days  -ES     Progress/Outcome (Transfer Goal 1, PT)  goal ongoing  -ES     Row Name 08/01/20 1509          Gait Training Goal 1 (PT)    Activity/Assistive Device (Gait Training Goal 1, PT)  gait (walking locomotion)  -ES     [place] Level (Gait Training Goal 1, PT)  minimum assist (75% or more patient effort);1 person assist  -ES     Distance (Gait Goal 1, PT)  200 feet   -ES     Time Frame (Gait Training Goal 1, PT)  long term goal (LTG);10 days   -ES     Progress/Outcome (Gait Training Goal 1, PT)  goal ongoing  -ES     Row Name 08/01/20 1504          Patient Education Goal (PT)    Activity (Patient Education Goal, PT)  Patient will be independent with HEP.   -ES     [place]/Cues/Accuracy (Memory Goal 2, PT)  demonstrates adequately;independent  -ES     Time Frame (Patient Education Goal, PT)  long term goal (LTG);10 days  -ES     Progress/Outcome (Patient Education Goal, PT)  goal ongoing  -ES       User Key  (r) = Recorded By, (t) = Taken By, (c) = Cosigned By    Initials Name Provider Type    ES Otilio, Misti, PT Physical Therapist        Clinical Impression     Row Name 08/01/20 1504          Pain Assessment    Additional Documentation  Pain Scale: FACES Pre/Post-Treatment (Group)  -ES     Row Name 08/01/20 1504          Pain Scale: FACES Pre/Post-Treatment    Pain: FACES Scale, Pretreatment  0-->no hurt  -ES     Pain: FACES Scale, Post-Treatment  0-->no hurt  -ES     Row Name 08/01/20 1504          Plan of Care Review    Plan of Care Reviewed With  patient;daughter  -ES     Outcome Summary  PT initial evaluation completed. Patient demonstrates left sided weakness, sensory loss and decreased postural control resulting in impaired mobility. Patient able to perform bed mobilty with ModA x1 and transfers and gait to chair with Brigitte x2. Recommend IRF at discharge.   -ES     Row Name 08/01/20 1504          Physical Therapy Clinical Impression    Patient/Family Goals Statement (PT Clinical Impression)  Return to prior level of function.   -ES     Criteria for Skilled Interventions Met (PT Clinical Impression)  yes  -ES     Rehab Potential (PT Clinical Summary)  good, to achieve stated therapy goals  -ES     Predicted Duration of Therapy (PT)  10 days   -ES     Row Name 08/01/20 1504          Vital Signs    Pre Systolic BP Rehab  165  -ES     Pre Treatment Diastolic BP  59  -ES     Post Systolic BP Rehab  148  -ES     Post Treatment Diastolic BP  55  -ES      Pretreatment Heart Rate (beats/min)  80  -ES     Posttreatment Heart Rate (beats/min)  81  -ES     Pre SpO2 (%)  95  -ES     O2 Delivery Pre Treatment  nasal cannula  -ES     O2 Delivery Intra Treatment  nasal cannula  -ES     Post SpO2 (%)  95  -ES     O2 Delivery Post Treatment  nasal cannula  -ES     Pre Patient Position  Supine  -ES     Intra Patient Position  Standing  -ES     Post Patient Position  Sitting  -ES     Row Name 08/01/20 1504          Positioning and Restraints    Pre-Treatment Position  in bed  -ES     Post Treatment Position  chair  -ES     In Chair  notified nsg;reclined;call light within reach;encouraged to call for assist;exit alarm on;with family/caregiver;RUE elevated;LUE elevated;legs elevated;waffle cushion  -ES       User Key  (r) = Recorded By, (t) = Taken By, (c) = Cosigned By    Initials Name Provider Type    ES Otilio, Misti, PT Physical Therapist        Outcome Measures     Row Name 08/01/20 1613          How much help from another person do you currently need...    Turning from your back to your side while in flat bed without using bedrails?  2  -ES     Moving from lying on back to sitting on the side of a flat bed without bedrails?  2  -ES     Moving to and from a bed to a chair (including a wheelchair)?  3  -ES     Standing up from a chair using your arms (e.g., wheelchair, bedside chair)?  3  -ES     Climbing 3-5 steps with a railing?  1  -ES     To walk in hospital room?  2  -ES     AM-PAC 6 Clicks Score (PT)  13  -ES     Row Name 08/01/20 1613          Modified [place] Scale    Pre-Stroke Modified [place] Scale  0 - No Symptoms at all.  -ES     Modified [place] Scale  4 - Moderately severe disability.  Unable to walk without assistance, and unable to attend to own bodily needs without assistance.  -ES     Row Name 08/01/20 1613          Functional Assessment    Outcome Measure Options  AM-PAC 6 Clicks Basic Mobility (PT);Modified [place]  -ES       User Key  (r) = Recorded By, (t) =  Taken By, (c) = Cosigned By    Initials Name Provider Type    Misti Donato PT Physical Therapist        Physical Therapy Education                 Title: PT OT SLP Therapies (In Progress)     Topic: Physical Therapy (In Progress)     Point: Mobility training (In Progress)     Description:   Instruct learner(s) on safety and technique for assisting patient out of bed, chair or wheelchair.  Instruct in the proper use of assistive devices, such as walker, crutches, cane or brace.              Patient Friendly Description:   It's important to get you on your feet again, but we need to do so in a way that is safe for you. Falling has serious consequences, and your personal safety is the most important thing of all.        When it's time to get out of bed, one of us or a family member will sit next to you on the bed to give you support.     If your doctor or nurse tells you to use a walker, crutches, a cane, or a brace, be sure you use it every time you get out of bed, even if you think you don't need it.    Learning Progress Summary           Patient Acceptance, E, NR by ES at 8/1/2020 1504   Family Acceptance, E, NR by ES at 8/1/2020 1504                   Point: Home exercise program (In Progress)     Description:   Instruct learner(s) on appropriate technique for monitoring, assisting and/or progressing patient with therapeutic exercises and activities.              Learning Progress Summary           Patient Acceptance, E, NR by ES at 8/1/2020 1504   Family Acceptance, E, NR by ES at 8/1/2020 1504                   Point: Body mechanics (In Progress)     Description:   Instruct learner(s) on proper positioning and spine alignment for patient and/or caregiver during mobility tasks and/or exercises.              Learning Progress Summary           Patient Acceptance, E, NR by ES at 8/1/2020 1504   Family Acceptance, E, NR by ES at 8/1/2020 1504                   Point: Precautions (In Progress)     Description:    Instruct learner(s) on prescribed precautions during mobility and gait tasks              Learning Progress Summary           Patient Acceptance, E, NR by ES at 8/1/2020 1504   Family Acceptance, E, NR by ES at 8/1/2020 1504                               User Key     Initials Effective Dates Name Provider Type Discipline    [place] 06/18/19 -  Misti Yañez, PT Physical Therapist PT              PT Recommendation and Plan  Planned Therapy Interventions (PT Eval): balance training, bed mobility training, gait training, home exercise program, neuromuscular re-education, patient/family education, postural re-education, strengthening, transfer training  Outcome Summary/Treatment Plan (PT)  Anticipated Equipment Needs at Discharge (PT): (Defer DME needs to rehab. )  Anticipated Discharge Disposition (PT): inpatient rehabilitation facility  Plan of Care Reviewed With: patient, daughter  Outcome Summary: PT initial evaluation completed. Patient demonstrates left sided weakness, sensory loss and decreased postural control resulting in impaired mobility. Patient able to perform bed mobilty with ModA x1 and transfers and gait to chair with Brigitte x2. Recommend IRF at discharge.      Time Calculation:   PT Charges     Row Name 08/01/20 1504             Time Calculation    Start Time  1504  -ES      PT Received On  08/01/20  -ES      PT Goal Re-Cert Due Date  08/11/20  -ES         Timed Charges    45781 - PT Therapeutic Activity Minutes  25  -ES        User Key  (r) = Recorded By, (t) = Taken By, (c) = Cosigned By    Initials Name Provider Type    ES Misti Yañez, PT Physical Therapist        Therapy Charges for Today     Code Description Service Date Service Provider Modifiers Qty    [id number]  PT THERAPEUTIC ACT EA 15 MIN 8/1/2020 Misti Yañez, PT GP 2    [id number]  PT EVAL MOD COMPLEXITY 4 8/1/2020 Misti Yañez, PT GP 1          PT G-Codes  Outcome Measure Options: AM-PAC 6 Clicks Basic Mobility (PT), Modified [place]  AM-PAC 6  Clicks Score (PT): 13  AM-PAC 6 Clicks Score (OT): 6  Modified [place] Scale: 4 - Moderately severe disability.  Unable to walk without assistance, and unable to attend to own bodily needs without assistance.    Misti Yañez, PT  8/1/2020

## 2020-08-02 ENCOUNTER — ANCILLARY PROCEDURE (OUTPATIENT)
Dept: SPEECH THERAPY | Facility: HOSPITAL | Age: 80
End: 2020-08-02

## 2020-08-02 LAB
ANION GAP SERPL CALCULATED.3IONS-SCNC: 16 MMOL/L (ref 5–15)
BUN SERPL-MCNC: 32 MG/DL (ref 8–23)
BUN/CREAT SERPL: 22.2 (ref 7–25)
CALCIUM SPEC-SCNC: 9.4 MG/DL (ref 8.6–10.5)
CHLORIDE SERPL-SCNC: 106 MMOL/L (ref 98–107)
CO2 SERPL-SCNC: 20 MMOL/L (ref 22–29)
CREAT SERPL-MCNC: 1.44 MG/DL (ref 0.57–1)
GFR SERPL CREATININE-BSD FRML MDRD: 35 ML/MIN/1.73
GLUCOSE BLDC GLUCOMTR-MCNC: 206 MG/DL (ref 70–130)
GLUCOSE BLDC GLUCOMTR-MCNC: 237 MG/DL (ref 70–130)
GLUCOSE BLDC GLUCOMTR-MCNC: 279 MG/DL (ref 70–130)
GLUCOSE BLDC GLUCOMTR-MCNC: 288 MG/DL (ref 70–130)
GLUCOSE BLDC GLUCOMTR-MCNC: 303 MG/DL (ref 70–130)
GLUCOSE BLDC GLUCOMTR-MCNC: 335 MG/DL (ref 70–130)
GLUCOSE SERPL-MCNC: 233 MG/DL (ref 65–99)
MAGNESIUM SERPL-MCNC: 2.5 MG/DL (ref 1.6–2.4)
PHOSPHATE SERPL-MCNC: 2.9 MG/DL (ref 2.5–4.5)
POTASSIUM SERPL-SCNC: 4.2 MMOL/L (ref 3.5–5.2)
SODIUM SERPL-SCNC: 142 MMOL/L (ref 136–145)

## 2020-08-02 PROCEDURE — 82962 GLUCOSE BLOOD TEST: CPT

## 2020-08-02 PROCEDURE — 63710000001 INSULIN LISPRO (HUMAN) PER 5 UNITS: Performed by: INTERNAL MEDICINE

## 2020-08-02 PROCEDURE — 92612 ENDOSCOPY SWALLOW (FEES) VID: CPT

## 2020-08-02 PROCEDURE — 92507 TX SP LANG VOICE COMM INDIV: CPT

## 2020-08-02 PROCEDURE — 84100 ASSAY OF PHOSPHORUS: CPT | Performed by: INTERNAL MEDICINE

## 2020-08-02 PROCEDURE — 63710000001 INSULIN DETEMIR PER 5 UNITS: Performed by: INTERNAL MEDICINE

## 2020-08-02 PROCEDURE — 99232 SBSQ HOSP IP/OBS MODERATE 35: CPT | Performed by: INTERNAL MEDICINE

## 2020-08-02 PROCEDURE — 63710000001 INSULIN LISPRO (HUMAN) PER 5 UNITS

## 2020-08-02 PROCEDURE — 80048 BASIC METABOLIC PNL TOTAL CA: CPT | Performed by: INTERNAL MEDICINE

## 2020-08-02 PROCEDURE — 83735 ASSAY OF MAGNESIUM: CPT | Performed by: INTERNAL MEDICINE

## 2020-08-02 RX ADMIN — SODIUM CHLORIDE, PRESERVATIVE FREE 10 ML: 5 INJECTION INTRAVENOUS at 09:28

## 2020-08-02 RX ADMIN — CLOPIDOGREL BISULFATE 75 MG: 75 TABLET ORAL at 08:04

## 2020-08-02 RX ADMIN — INSULIN LISPRO 7 UNITS: 100 INJECTION, SOLUTION INTRAVENOUS; SUBCUTANEOUS at 10:44

## 2020-08-02 RX ADMIN — INSULIN LISPRO 4 UNITS: 100 INJECTION, SOLUTION INTRAVENOUS; SUBCUTANEOUS at 18:33

## 2020-08-02 RX ADMIN — INSULIN LISPRO 3 UNITS: 100 INJECTION, SOLUTION INTRAVENOUS; SUBCUTANEOUS at 08:04

## 2020-08-02 RX ADMIN — INSULIN DETEMIR 10 UNITS: 100 INJECTION, SOLUTION SUBCUTANEOUS at 21:31

## 2020-08-02 RX ADMIN — SODIUM CHLORIDE, PRESERVATIVE FREE 10 ML: 5 INJECTION INTRAVENOUS at 21:31

## 2020-08-02 RX ADMIN — SODIUM CHLORIDE 50 ML/HR: 9 INJECTION, SOLUTION INTRAVENOUS at 13:53

## 2020-08-02 RX ADMIN — ASPIRIN 325 MG ORAL TABLET 325 MG: 325 PILL ORAL at 08:04

## 2020-08-02 RX ADMIN — ATORVASTATIN CALCIUM 80 MG: 40 TABLET, FILM COATED ORAL at 21:31

## 2020-08-02 RX ADMIN — LEVOTHYROXINE SODIUM 88 MCG: 88 TABLET ORAL at 05:31

## 2020-08-02 RX ADMIN — INSULIN LISPRO 4 UNITS: 100 INJECTION, SOLUTION INTRAVENOUS; SUBCUTANEOUS at 00:28

## 2020-08-02 NOTE — PLAN OF CARE
Problem: Patient Care Overview  Goal: Plan of Care Review  Outcome: Ongoing (interventions implemented as appropriate)  Flowsheets  Taken 8/2/2020 1948  Progress: improving  Outcome Summary: Transferred from . VSS. Oriented x4, pt appears to sleep between hourly rounding. NIH 12. Blood pressures monitored and systolic is around 170s; remains off of cardene drip. No reports of pain. Sinus rhythm on the monitor.  Taken 8/2/2020 1400  Plan of Care Reviewed With: patient

## 2020-08-02 NOTE — MBS/VFSS/FEES
Acute Care - Speech Language Pathology   Swallow Initial Evaluation [facility]   Fiberoptic Endoscopic Evaluation of Swallowing (FEES)     Patient Name: Marquez Myrick  : 1940  MRN: [id number]  Today's Date: 2020  Onset of Illness/Injury or Date of Surgery: 20     Referring Physician: BRITTNEE Coyle      Admit Date: 2020    Visit Dx:     ICD-10-CM ICD-9-CM   1. Acute ischemic stroke (CMS/HCC) I63.9 434.91   2. Dysarthria R47.1 784.51   3. Impaired mobility and ADLs Z74.09 V49.89    Z78.9    4. Oropharyngeal dysphagia R13.12 787.22   5. Cognitive communication deficit R41.841 799.52     Patient Active Problem List   Diagnosis   • H/O grade II follicular lymphoma    • Hypertension   • Dyslipidemia   • Hypothyroidism   • T2DM    • AIS    • Smoker     Past Medical History:   Diagnosis Date   • Dyslipidemia 2020   • Hypertension 2020   • Hypothyroidism 2020     Past Surgical History:   Procedure Laterality Date   • APPENDECTOMY     • CHOLECYSTECTOMY     • COLON SURGERY Right 2005    R colon resection    • DEEP NECK LYMPH NODE BIOPSY / EXCISION     • HYSTERECTOMY     • PORTACATH PLACEMENT Left     L subclavian         SWALLOW EVALUATION (last 72 hours)      SLP Adult Swallow Evaluation     Row Name 20 0930 20 1420 20 0800             Rehab Evaluation    Document Type  evaluation  -SM  re-evaluation  -  evaluation  -      Subjective Information  no complaints  -  --  no complaints  -      Patient Observations  alert;cooperative  -  lethargic;cooperative  -  lethargic;cooperative  -      Patient/Family Observations  Dtr present at end of session, education provided  -  Son present  -  Dtr present  -         General Information    Patient Profile Reviewed  --  --  yes  -SM      Pertinent History Of Current Problem  --  --  R frontal infarct, s/p mechanical thrombectomy  -      Current Method of Nutrition  --  NPO  -SM  NPO  -SM       "Prior Level of Function-Swallowing  --  --  safe, efficient swallowing in all situations  -      Plans/Goals Discussed with  patient and family;agreed upon  -  --  patient and family;agreed upon  -      Barriers to Rehab  none identified  -  --  none identified  -      Patient's Goals for Discharge  -- \"water\"  -  --  return to PO diet  -      Family Goals for Discharge  family did not state  -  --  patient able to return to PO diet;patient able to eat/drink without coughing/choking  -         Pain Assessment    Additional Documentation  --  --  Pain Scale: Numbers Pre/Post-Treatment (Group)  -         Pain Scale: Numbers Pre/Post-Treatment    Pain Scale: Numbers, Pretreatment  0/10 - no pain  -SM  --  0/10 - no pain  -      Pain Scale: Numbers, Post-Treatment  0/10 - no pain  -SM  --  0/10 - no pain  -         Oral Musculature and Cranial Nerve Assessment    Oral Labial or Buccal Impairment, Detail, Cranial Nerve VII (Facial):  --  --  left labial droop  -      Lingual Impairment, Detail. Cranial Nerves IX, XII (Glossopharyngeal and Hypoglossal)  --  --  reduced strength left  -         Clinical Swallow Eval    Oral Prep Phase  --  --  impaired  -SM      Oral Transit  --  --  impaired  -SM      Pharyngeal Phase  --  --  suspected pharyngeal impairment  -      Clinical Swallow Evaluation Summary  --  Lethargic though when cued to alert, showing no s/s aspiraiton with nectar-thick liquids, pudding, or solids - though prolonged manipulation with solids. Will initiate modified PO diet and arrange for FEES tomorrow, 8/2. Allow PO intake only when alert. RN in agreement.   -  --         Oral Prep Concerns    Oral Prep Concerns  --  --  prolonged mastication;inefficient mastication  -         Oral Transit Concerns    Oral Transit Concerns  --  --  increased oral transit time  -         Pharyngeal Phase Concerns    Pharyngeal Phase Concerns  --  --  cough  -      Cough  --  --  " thin;other (see comments) with large sip and as liquid wash  -SM         Fiberoptic Endoscopic Evaluation of Swallowing (FEES)    Risks/Benefits Reviewed  risks/benefits explained;patient;agreed to eval  -SM  --  --      Nasal Entry  left:  -SM  --  --      Special Considerations  Scope #338  -SM  --  --         Anatomy and Physiology    Velopharyngeal  WFL  -SM  --  --      Base of Tongue  symmetrical  -SM  --  --      Epiglottis  WFL  -SM  --  --      Laryngeal Function Breathing  symmetrical  -SM  --  --      Laryngeal Function Phonation  asymmetrical  -SM  --  --      Laryngeal Function to Breath Hold  TVF/FVF/Arytenoid;sustained closure  -SM  --  --      Secretion Rating Scale (Jose Angel chaocn alDorene 1996)  0- normal, no visible secretions  -SM  --  --      Secretion Description  thin;clear  -SM  --  --      Spontaneous Swallow  frequency adequate  -SM  --  --      Sensory  sensed scope  -SM  --  --      Utensils Used  Spoon;Cup;Straw  -SM  --  --      Consistencies Trialed  thin liquids;nectar-thick liquids;pudding/puree;Regular textures  -SM  --  --         FEES Interpretation    Oral Phase  reduced lingual control;prolonged manipulation;increased A-P transit time;prespill of liquids into pharynx;oral residue present  -SM  --  --         Initiation of Pharyngeal Swallow    Initiation of Pharyngeal Swallow  bolus in pyriform sinuses  -SM  --  --      Pharyngeal Phase  impaired pharyngeal phase of swallowing  -SM  --  --      Aspiration After the Swallow  thin liquids;secondary to residue;in pyriform sinuses;other (see comments) with some pooling or oral residue to pyriforms  -SM  --  --      Response to Aspiration  reflexive response;inconsistent response;cleared subglottic material;other (see comments) no sensation just below folds, cough as deepened  -SM  --  --      Rosenbek's Scale  thin:;8-->Level 8  -SM  --  --      Residue  regular Textures;valleculae;secondary to reduced base of tongue retraction;thin  liquids;pyriform sinuses;secondary to reduced hyolaryngeal excursion some solids residual coating on posterior commisure  -  --  --      Response to Residue  cleared residue;with liquid wash  -  --  --      Attempted Compensatory Maneuvers  bolus size;bolus presentation style  -  --  --      Response to Attempted Compensatory Maneuvers  did not prevent aspiration  -  --  --         Clinical Impression    SLP Swallowing Diagnosis  moderate;oral dysfunction;pharyngeal dysfunction  -  moderate;oral dysfunction;suspected pharyngeal dysfunction  -  moderate;oral dysfunction;suspected pharyngeal dysfunction  -      Functional Impact  risk of aspiration/pneumonia  -  risk of aspiration/pneumonia  -  risk of aspiration/pneumonia  -      Rehab Potential/Prognosis, Swallowing  good, to achieve stated therapy goals  -  --  --      Swallow Criteria for Skilled Therapeutic Interventions Met  demonstrates skilled criteria  -  --  --         Recommendations    Therapy Frequency (Swallow)  5 days per week  -  --  --      Predicted Duration Therapy Intervention (Days)  until discharge  -  --  --      SLP Diet Recommendation  puree with some mashed;nectar thick liquids  -  puree with some mashed;nectar thick liquids  -  NPO;other (see comments) ok for ice chips  -      Recommended Diagnostics  --  FEES 8/2  -  VFSS (MBS)  -      Recommended Precautions and Strategies  upright posture during/after eating;small bites of food and sips of liquid  -  upright posture during/after eating;small bites of food and sips of liquid;other (see comments) intake only when alert.   -  upright posture during/after eating  -      SLP Rec. for Method of Medication Administration  meds whole;meds crushed;with pudding or applesauce;as tolerated  -  meds whole;meds crushed;with pudding or applesauce;as tolerated  -  meds whole;meds crushed;with pudding or applesauce  -      Monitor for Signs of Aspiration   --  yes;notify SLP if any concerns  -SM  --      Anticipated Dischage Disposition (SLP)  inpatient rehabilitation facility;anticipate therapy at next level of care  -SM  --  --        User Key  (r) = Recorded By, (t) = Taken By, (c) = Cosigned By    Initials Name Effective Dates    Lisseth Martinez, MS CCC-SLP 06/22/15 -           EDUCATION  The patient has been educated in the following areas:   Cognitive Impairment Communication Impairment Dysphagia (Swallowing Impairment) Oral Care/Hydration Modified Diet Instruction.    SLP Recommendation and Plan  SLP Swallowing Diagnosis: moderate, oral dysfunction, pharyngeal dysfunction  SLP Diet Recommendation: puree with some mashed, nectar thick liquids  Recommended Precautions and Strategies: upright posture during/after eating, small bites of food and sips of liquid  SLP Rec. for Method of Medication Administration: meds whole, meds crushed, with pudding or applesauce, as tolerated           Swallow Criteria for Skilled Therapeutic Interventions Met: demonstrates skilled criteria  Anticipated Dischage Disposition (SLP): inpatient rehabilitation facility, anticipate therapy at next level of care  Rehab Potential/Prognosis, Swallowing: good, to achieve stated therapy goals  Therapy Frequency (Swallow): 5 days per week  Predicted Duration Therapy Intervention (Days): until discharge       Plan of Care Reviewed With: patient, daughter    SLP GOALS     Row Name 08/02/20 0930 08/01/20 0800          Oral Nutrition/Hydration Goal 1 (SLP)    Oral Nutrition/Hydration Goal 1, SLP  LTG: Tolerate soft diet with nectar-thick liquids without s/s aspiration with 100% accuracy and no cues  -SM  --     Time Frame (Oral Nutrition/Hydration Goal 1, SLP)  by discharge  -SM  --        Oral Nutrition/Hydration Goal 2 (SLP)    Oral Nutrition/Hydration Goal 2, SLP  Tolerate ice chips and thin H2O without s/s aspiration (wet vocal quality, cough, often delayed) with 80% accuracy and no  cues.  -SM  --     Time Frame (Oral Nutrition/Hydration Goal 2, SLP)  short term goal (STG)  -SM  --        Labial Strengthening Goal 1 (SLP)    Activity (Labial Strengthening Goal 1, SLP)  increase labial tone  -SM  --     Increase Labial Tone  labial resistance exercises;swallow trials  -SM  --     [place]/Accuracy (Labial Strengthening Goal 1, SLP)  independently (over 90% accuracy)  -SM  --     Time Frame (Labial Strengthening Goal 1, SLP)  short term goal (STG)  -SM  --        Lingual Strengthening Goal 1 (SLP)    Activity (Lingual Strengthening Goal 1, SLP)  increase lingual tone/sensation/control/coordination/movement;increase tongue back strength  -SM  --     Increase Lingual Tone/Sensation/Control/Coordination/Movement  swallow trials;lingual resistance exercises  -SM  --     Increase Tongue Back Strength  lingual resistance exercises  -SM  --     [place]/Accuracy (Lingual Strengthening Goal 1, SLP)  independently (over 90% accuracy)  -SM  --     Time Frame (Lingual Strengthening Goal 1, SLP)  short term goal (STG)  -SM  --        Pharyngeal Strengthening Exercise Goal 1 (SLP)    Activity (Pharyngeal Strengthening Goal 1, SLP)  increase timing;increase anterior movement of the hyolaryngeal complex;increase squeeze/positive pressure generation  -SM  --     Increase Timing  prepping - 3 second prep or suck swallow or 3-step swallow;supraglottic swallow  -SM  --     Increase Anterior Movement of the Hyolaryngeal Complex  chin tuck against resistance (CTAR)  -SM  --     Increase Squeeze/Positive Pressure Generation  hard effortful swallow  -SM  --     [place]/Accuracy (Pharyngeal Strengthening Goal 1, SLP)  independently (over 90% accuracy)  -SM  --     Time Frame (Pharyngeal Strengthening Goal 1, SLP)  short term goal (STG)  -SM  --        Swallow Management Recall Goal 1 (SLP)    Activity (Swallow Management Recall Goal 1, SLP)  safe diet/liquid level;compensatory swallow  strategies/techniques;rationale for use of strategies/techniques  -SM  --     [place]/Accuracy (Swallow Management Recall Goal 1, SLP)  independently (over 90% accuracy)  -SM  --     Time Frame (Swallow Management Recall Goal 1, SLP)  short term goal (STG)  -SM  --        Swallow Compensatory Strategies Goal 1 (SLP)    Activity (Swallow Compensatory Strategies/Techniques Goal 1, SLP)  compensatory strategies;during meal intake;during p.o. trials;small bites;small cup sips;small straw sips  -SM  --     [place]/Accuracy (Swallow Compensatory Strategies/Techniques Goal 1, SLP)  independently (over 90% accuracy)  -SM  --     Time Frame (Swallow Compensatory Strategies/Techniques Goal 1, SLP)  short term goal (STG)  -SM  --        Articulation Goal 1 (SLP)    Improve Articulation Goal 1 (SLP)  by over-articulating at word level;80%;with minimal cues (75-90%)  -SM  by over-articulating at word level;80%;with minimal cues (75-90%)  -SM     Time Frame (Articulation Goal 1, SLP)  short term goal (STG)  -SM  short term goal (STG)  -SM     Progress/Outcomes (Articulation Goal 1, SLP)  goal ongoing  -SM  --        Attention Goal 1 (SLP)    Improve Attention by Goal 1 (SLP)  complete sustained attention task;100%;with minimal cues (75-90%)  -SM  complete sustained attention task;100%;with minimal cues (75-90%)  -SM     Time Frame (Attention Goal 1, SLP)  short term goal (STG)  -SM  short term goal (STG)  -SM     Progress (Attention Goal 1, SLP)  90%;with moderate cues (50-74%)  -SM  --     Progress/Outcomes (Attention Goal 1, SLP)  good progress toward goal  -SM  --        Right Hemisphere Function Goal 1 (SLP)    Improve Right Hemisphere Function Through Goal 1 (SLP)  demonstrate awareness of communication partner in left visual field;use compensatory strategies for left neglect;identify physical/cognitive strengths and limitations;70%;with moderate cues (50-74%)  -SM  demonstrate awareness of communication partner in  left visual field;use compensatory strategies for left neglect;identify physical/cognitive strengths and limitations;70%;with moderate cues (50-74%)  -SM     Time Frame (Right Hemisphere Function Goal 1, SLP)  short term goal (STG)  -SM  short term goal (STG)  -SM     Progress (Right Hemisphere Function Goal 1, SLP)  50%;with minimal cues (75-90%)  -SM  --     Progress/Outcomes (Right Hemisphere Function Goal 1, SLP)  good progress toward goal  -SM  --     Comment (Right Hemisphere Function Goal 1, SLP)  targeted only L neglect tasks - attending to stimuli on midline to slightly left side.   -SM  --        Additional Goal 1 (SLP)    Additional Goal 1, SLP  LTG: Improve cog-comm skills in order to participate in care while in hospital setting with 100% accuracy and min cues  -SM  LTG: Improve cog-comm skills in order to participate in care while in hospital setting with 100% accuracy and min cues  -SM     Time Frame (Additional Goal 1, SLP)  by discharge  -SM  --     Progress/Outcomes (Additional Goal 1, SLP)  good progress toward goal  -SM  --       User Key  (r) = Recorded By, (t) = Taken By, (c) = Cosigned By    Initials Name Provider Type    Lisseth Martinez MS CCC-SLP Speech and Language Pathologist             Time Calculation:   Time Calculation- SLP     Row Name 08/02/20 1040             Time Calculation- SLP    SLP Start Time  0930  -      SLP Received On  08/02/20  -        User Key  (r) = Recorded By, (t) = Taken By, (c) = Cosigned By    Initials Name Provider Type    Lisseth Martinez MS CCC-SLP Speech and Language Pathologist          Therapy Charges for Today     Code Description Service Date Service Provider Modifiers Qty    67391929600 HC ST EVAL ORAL PHARYNG SWALLOW 3 8/1/2020 Lisseth Suh MS CCC-SLP GN 1    [id number] HC ST EVAL SPEECH AND PROD W LANG  2 8/1/2020 Lisseth Suh MS CCC-SLP GN 1    27044233476 HC ST EVAL ORAL PHARYNG SWALLOW 2 8/1/2020 Vikash  Lisseth PALMER, MS CCC-SLP GN 1    [id number]  ST TREATMENT SPEECH 1 8/2/2020 MoLisseth green, MS CCC-SLP GN 1    [id number] [facility] FIBEROPTIC ENDO EVAL SWALL 6 8/2/2020 Lisseth Suh, MS CCC-SLP GN 1            Patient was not wearing a face mask and did exhibit coughing during this therapy encounter.  Procedure performed was aerosolizing, involved close contact (within 6 feet for at least 15 minutes or longer), and did not involve contact with infectious secretions or specimens.  Therapist used appropriate personal protective equipment including gloves, standard procedure mask, eye protection, gown and N95 mask.  Appropriate PPE was worn during the entire therapy session.  Hand hygiene was completed before and after therapy session.       Lisseth Suh, MS CCC-SLP  8/2/2020

## 2020-08-02 NOTE — PLAN OF CARE
Problem: Patient Care Overview  Goal: Plan of Care Review  Outcome: Ongoing (interventions implemented as appropriate)  Flowsheets (Taken 8/2/2020 0617)  Progress: improving  Plan of Care Reviewed With: patient  Outcome Summary: VSS, Cardene off for majority of shift. Pt remains lethargic but arouses to voice. A&Ox4. No neuro changes during shift. NIHSS 14 at shift change. Femoral site remains C/D/I. Adequate urine output. Family updated. Will continue to monitor.     Problem: Fall Risk (Adult)  Goal: Absence of Fall  Outcome: Ongoing (interventions implemented as appropriate)  Flowsheets (Taken 8/2/2020 0617)  Absence of Fall: achieves outcome     Problem: Skin Injury Risk (Adult)  Goal: Skin Health and Integrity  Outcome: Ongoing (interventions implemented as appropriate)  Flowsheets (Taken 8/2/2020 0617)  Skin Health and Integrity: achieves outcome     Problem: Cardiac Catheterization (Diagnostic/Interventional) (Adult)  Goal: Anesthesia/Sedation Recovery  Outcome: Ongoing (interventions implemented as appropriate)

## 2020-08-02 NOTE — PLAN OF CARE
Problem: Patient Care Overview  Goal: Plan of Care Review  Outcome: Ongoing (interventions implemented as appropriate)  Flowsheets (Taken 8/2/2020 1030)  Plan of Care Reviewed With: patient; daughter  Note:   SLP evaluation and treatment with FEES completed. Continue modified diet as ordered. Will continue to address dysphagia and cog-comm deficits. Please see note for further details and recommendations.

## 2020-08-02 NOTE — PROGRESS NOTES
INTENSIVIST   PROGRESS NOTE     Hospital:  LOS: 2 days     Ms. Marquez Myrick, 80 y.o. female is followed for a Chief Complaint of: CVA      Subjective   S     Interval History:  No acute events overnight. Started on Dysphagia III diet. Awaiting FEES.        The patient's relevant past medical, surgical and social history were reviewed and updated in Epic as appropriate.      ROS:   Constitutional: Negative for fever.   Respiratory: Negative for dyspnea.   Cardiovascular: Negative for chest pain.   Gastrointestinal: Negative for  nausea, vomiting and diarrhea.     Objective   O     Vitals:  Temp  Min: 98.3 °F (36.8 °C)  Max: 100.2 °F (37.9 °C)  BP  Min: 105/95  Max: 194/81  Pulse  Min: 74  Max: 102  Resp  Min: 18  Max: 22  SpO2  Min: 81 %  Max: 98 % Flow (L/min)  Min: 2  Max: 3    Intake/Ouptut 24 hrs (7:00AM - 6:59 AM)  Intake & Output (last 3 days)       07/30 0701 - 07/31 0700 07/31 0701 - 08/01 0700 08/01 0701 - 08/02 0700 08/02 0701 - 08/03 0700    P.O.  0 150 400    I.V. (mL/kg)  272.6 (3.5) 948.6 (11.8) 217 (2.7)    Total Intake(mL/kg)  272.6 (3.5) 1098.6 (13.7) 617 (7.7)    Urine (mL/kg/hr)  2600 (1.4) 1060 (0.6)     Emesis/NG output  0      Total Output  2600 1060     Net  -2327.4 +38.6 +617            Urine Unmeasured Occurrence  2 x 2 x     Emesis Unmeasured Occurrence  4 x            Medications (drips):    niCARdipine Last Rate: Stopped (08/01/20 2210)   phenylephrine Last Rate: Stopped (07/31/20 1359)   sodium chloride Last Rate: 50 mL/hr (08/01/20 1811)         Physical Examination  Telemetry:  Normal sinus rhythm.    Constitutional:  No acute distress.  Resting in bed.    Eyes: No scleral icterus.   PERRL, EOM intact.    Neck:  Supple, FROM   Cardiovascular: Normal rate, regular and rhythm. Normal heart sounds.  No murmurs, gallop or rub.   Respiratory: No respiratory distress. Normal respiratory effort.  Normal breath sounds  Clear to ascultation   Abdominal:  Soft. No masses. Non-tender. No  distension. No HSM.   Extremities: No digital cyanosis. No clubbing.  No peripheral edema.   Skin: No rashes, lesions or ulcers   Neurological:   Alert and interactive. Left sided neglect.        Interval: handoff  1a. Level of Consciousness: 1-->Not alert, but arousable by minor stimulation to obey, answer, or respond  1b. LOC Questions: 0-->Answers both questions correctly  1c. LOC Commands: 0-->Performs both tasks correctly  2. Best Gaze: 1-->Partial gaze palsy, gaze is abnormal in one or both eyes, but forced deviation or total gaze paresis is not present  3. Visual: 1-->Partial hemianopia  4. Facial Palsy: 3-->Complete paralysis of one or both sides (absence of facial movement in the upper and lower face)  5a. Motor Arm, Left: 1-->Drift, limb holds 90 (or 45) degrees, but drifts down before full 10 seconds, does not hit bed or other support  5b. Motor Arm, Right: 0-->No drift, limb holds 90 (or 45) degrees for full 10 secs  6a. Motor Leg, Left: 1-->Drift, leg falls by the end of the 5-sec period but does not hit bed  6b. Motor Leg, Right: 0-->No drift, leg holds 30 degree position for full 5 secs  7. Limb Ataxia: 0-->Absent  8. Sensory: 2-->Severe to total sensory loss, patient is not aware of being touched in the face, arm, and leg  9. Best Language: 0-->No aphasia, normal  10. Dysarthria: 1-->Mild-to-moderate dysarthria, patient slurs at least some words and, at worst, can be understood with some difficulty  11. Extinction and Inattention (formerly Neglect): 2-->Profound bishop-inattention/extinction more than 1 modality    Total (NIH Stroke Scale): 13       Results from last 7 days   Lab Units 08/01/20  0742 07/31/20  0530 07/31/20  0522   WBC 10*3/mm3 17.40*  --  11.93*   HEMOGLOBIN g/dL 12.8  --  13.8   HEMOGLOBIN, POC g/dL  --  15.6  --    MCV fL 94.3  --  89.9   PLATELETS 10*3/mm3 197  --  234     Results from last 7 days   Lab Units 08/02/20  0610 08/01/20  0742 07/31/20  0523   SODIUM mmol/L 142 140  --     POTASSIUM mmol/L 4.2 4.5  --    CO2 mmol/L 20.0* 20.0*  --    CREATININE mg/dL 1.44* 1.81* 1.30   GLUCOSE mg/dL 233* 257*  --    MAGNESIUM mg/dL 2.5* 2.9*  --    PHOSPHORUS mg/dL 2.9  --   --      Estimated Creatinine Clearance: 34.4 mL/min (A) (by C-G formula based on SCr of 1.44 mg/dL (H)).  Results from last 7 days   Lab Units 07/31/20  0522   ALT (SGPT) U/L 13   AST (SGOT) U/L 19       Results from last 7 days   Lab Units 07/31/20  0530   PH, ARTERIAL pH units 7.40       Images:    Imaging Results (Last 24 Hours)     Procedure Component Value Units Date/Time    SLP FEES - Fiberoptic Endo Eval Swallow [[id number]] Resulted:  08/02/20 1018     Updated:  08/02/20 1018    Narrative:       This procedure was auto-finalized with no dictation required.    CT Angiogram Head [[id number]] Collected:  07/31/20 0913     Updated:  08/01/20 1837    Narrative:       EXAMINATION: CT ANGIOGRAM HEAD- 07/31/2020     INDICATION: Stroke; I63.9-Cerebral infarction, unspecified;  R47.1-Dysarthria and anarthria     TECHNIQUE: Pre and postcontrast 3 mm and 0.75 mm axial images through  the brain with 2-D and 3-D VRT and MIP angiographic reconstructions.     The radiation dose reduction device was turned on for each scan per the  ALARA (As Low as Reasonably Achievable) protocol.     COMPARISON: Angiographic head CT scan of same date, 5:19 AM     FINDINGS: Current study is timed at 9:04 AM. Perhaps best appreciated on  thin section axial image #220 series 7 is restored flow in the anterior  right M2 branch, compared to the very subtle cut off seen on previous  image 338 series 7. There is again mild decrease in caliber of the  distal right M1 segment, less than 30% narrowing, without focal  stenosis. Remaining anterior and middle cerebral arteries and proximal  branches appear grossly normal. Distal vertebral arteries, basilar  artery, and posterior cerebral arteries appear grossly normal. Posterior  cerebral arteries are primarily  supplied via large posterior  communicating arteries. There is a relatively small basilar artery as a  result. No new intracranial vascular stenosis is identified. Axial  source images again show subtle hypoenhancement of the posterior right  frontal lobe, axial 3 mm image 91 series 902, consistent with an  approximately 4 cm area of mild edema/ischemia.       Impression:       1. Apparent restoration of flow to previously truncated right anterior  temporal branch.  2. Mild narrowing of the distal right M1 branch unchanged.  3. Subtle approximately 4 cm area of hypoenhancement in the posterior  right frontal lobe corresponding to perfusion scan abnormality.     D:  07/31/2020  E:  07/31/2020     This report was finalized on 8/1/2020 6:34 PM by Dr. Jass Milan MD.       MRI Brain Without Contrast [[id number]] Collected:  08/01/20 0831     Updated:  08/01/20 1203    Narrative:       EXAMINATION: MRI BRAIN WO CONTRAST - 08/01/2020     INDICATION:  I63.9-Cerebral infarction, unspecified; R47.1-Dysarthria  and anarthria. Left lower facial droop. Evaluate for stroke.     TECHNIQUE: Routine multiplanar imaging is obtained of the brain without  the administration of gadolinium contrast.     COMPARISON: NONE     FINDINGS: There is a moderate sized area of abnormal signal seen in the  right temporoparietal region. Findings to suggest an area of acute  ischemia. No evidence of hemorrhage. Some mild chronic small vessel  ischemic changes seen within the brain. Ventricular system is  unremarkable. Globes and orbits are intact. No evidence of  hydrocephalus. No mass, mass effect, or midline shift. No abnormal  extra-axial fluid collections identified. Pituitary and sellar  unremarkable. Craniovertebral junction is preserved. No cerebellar  pontine angle mass is identified. Visualized paranasal sinuses are  clear.       Impression:       Moderate size acute ischemic insult in the right temporal  parietal region. Minimal surrounding  edema with no significant mass  effect or effacement of the ventricular system. No hemorrhage.     DICTATED:   08/01/2020  EDITED/ls :   08/01/2020            CT Head Without Contrast [[id number]] Collected:  08/01/20 0824     Updated:  08/01/20 1153    Narrative:       EXAMINATION: CT HEAD WO CONTRAST - 08/01/2020     INDICATION:  I63.9-Cerebral infarction, unspecified; R47.1-Dysarthria  and anarthria. Stroke symptoms, follow-up CVA.     TECHNIQUE: Multiple axial CT imaging is obtained of the head from skull  base to skull vertex without the administration of intravenous contrast.     The radiation dose reduction device was turned on for each scan per the  ALARA (As Low as Reasonably Achievable) protocol.     COMPARISON: 07/31/2020     FINDINGS: There is an area of low density identified within the right  temporoparietal region suggesting patient's evolving area of infarction.  There is minimal surrounding edema and mass effect. No hemorrhage. No  midline shift. No effacement of the ventricular system. Bony structures  reveal no evidence of osseous abnormality. The visualized paranasal  sinuses are clear. The mastoid air cells are patent.       Impression:       Area of evolving infarction in the right temporoparietal  region. No significant effacement of the ventricular system with minimal  edema and mass effect. No midline shift. No hemorrhagic or conversion.     DICTATED:   08/01/2020  EDITED/ls :   08/01/2020                 Results: Reviewed.  I reviewed the patient's new laboratory and imaging results.  I independently reviewed the patient's new images.    Medications: Reviewed.    Assessment/Plan   A / P     Ms. Myrick is a 81yo F who was admitted for a right MCA CVA. She underwent thrombectomy of a right MCA clot. She was not a candidate for tPA as she was outside the window.     Nutrition:   Diet Dysphagia; III - Pureed With Some Mashed; Nectar / Syrup Thick; Consistent Carbohydrate  Advance  Directives:   Code Status and Medical Interventions:   Ordered at: 07/31/20 0803     Code Status:    CPR     Medical Interventions (Level of Support Prior to Arrest):    Full       Active Hospital Problems    Diagnosis   • **AIS    • H/O grade II follicular lymphoma    • Hypertension   • Dyslipidemia   • Hypothyroidism   • T2DM    • Smoker       Assessment / Plan:    1. Continue post-stroke care.   2. ASA and statin  3. PT/OT  4. Speech therapy. FEES today.   5. Increase Insulin regimen.   6. Echo negative for PFO  7. AM labs  8. Okay to transfer to telemetry when a bed is available.     High level of risk due to:  severe exacerbation of chronic illness and illness with threat to life or bodily function.    Plan of care and goals reviewed during interdisciplinary rounds.  I discussed the patient's findings and my recommendations with patient and nursing staff      Yazmin Helm, DO    Intensive Care Medicine and Pulmonary Medicine

## 2020-08-03 ENCOUNTER — APPOINTMENT (OUTPATIENT)
Dept: CT IMAGING | Facility: HOSPITAL | Age: 80
End: 2020-08-03

## 2020-08-03 LAB
ANION GAP SERPL CALCULATED.3IONS-SCNC: 12 MMOL/L (ref 5–15)
BUN SERPL-MCNC: 32 MG/DL (ref 8–23)
BUN/CREAT SERPL: 22.1 (ref 7–25)
CALCIUM SPEC-SCNC: 9.2 MG/DL (ref 8.6–10.5)
CHLORIDE SERPL-SCNC: 109 MMOL/L (ref 98–107)
CO2 SERPL-SCNC: 22 MMOL/L (ref 22–29)
CREAT SERPL-MCNC: 1.45 MG/DL (ref 0.57–1)
GFR SERPL CREATININE-BSD FRML MDRD: 35 ML/MIN/1.73
GLUCOSE BLDC GLUCOMTR-MCNC: 142 MG/DL (ref 70–130)
GLUCOSE BLDC GLUCOMTR-MCNC: 178 MG/DL (ref 70–130)
GLUCOSE BLDC GLUCOMTR-MCNC: 179 MG/DL (ref 70–130)
GLUCOSE BLDC GLUCOMTR-MCNC: 269 MG/DL (ref 70–130)
GLUCOSE SERPL-MCNC: 187 MG/DL (ref 65–99)
MAGNESIUM SERPL-MCNC: 2.4 MG/DL (ref 1.6–2.4)
PHOSPHATE SERPL-MCNC: 2.3 MG/DL (ref 2.5–4.5)
POTASSIUM SERPL-SCNC: 3.9 MMOL/L (ref 3.5–5.2)
SODIUM SERPL-SCNC: 143 MMOL/L (ref 136–145)

## 2020-08-03 PROCEDURE — 83735 ASSAY OF MAGNESIUM: CPT | Performed by: INTERNAL MEDICINE

## 2020-08-03 PROCEDURE — 82962 GLUCOSE BLOOD TEST: CPT

## 2020-08-03 PROCEDURE — 92507 TX SP LANG VOICE COMM INDIV: CPT

## 2020-08-03 PROCEDURE — 99232 SBSQ HOSP IP/OBS MODERATE 35: CPT | Performed by: NURSE PRACTITIONER

## 2020-08-03 PROCEDURE — 84100 ASSAY OF PHOSPHORUS: CPT | Performed by: INTERNAL MEDICINE

## 2020-08-03 PROCEDURE — 70450 CT HEAD/BRAIN W/O DYE: CPT

## 2020-08-03 PROCEDURE — 63710000001 INSULIN DETEMIR PER 5 UNITS: Performed by: INTERNAL MEDICINE

## 2020-08-03 PROCEDURE — 97110 THERAPEUTIC EXERCISES: CPT

## 2020-08-03 PROCEDURE — 80048 BASIC METABOLIC PNL TOTAL CA: CPT | Performed by: INTERNAL MEDICINE

## 2020-08-03 PROCEDURE — 99232 SBSQ HOSP IP/OBS MODERATE 35: CPT | Performed by: FAMILY MEDICINE

## 2020-08-03 PROCEDURE — 92610 EVALUATE SWALLOWING FUNCTION: CPT

## 2020-08-03 PROCEDURE — 63710000001 INSULIN LISPRO (HUMAN) PER 5 UNITS: Performed by: INTERNAL MEDICINE

## 2020-08-03 PROCEDURE — 97116 GAIT TRAINING THERAPY: CPT

## 2020-08-03 RX ORDER — SODIUM CHLORIDE 0.9 % (FLUSH) 0.9 %
10 SYRINGE (ML) INJECTION AS NEEDED
Status: DISCONTINUED | OUTPATIENT
Start: 2020-08-03 | End: 2020-08-06 | Stop reason: HOSPADM

## 2020-08-03 RX ORDER — HEPARIN SODIUM (PORCINE) LOCK FLUSH IV SOLN 100 UNIT/ML 100 UNIT/ML
5 SOLUTION INTRAVENOUS AS NEEDED
Status: DISCONTINUED | OUTPATIENT
Start: 2020-08-03 | End: 2020-08-06 | Stop reason: HOSPADM

## 2020-08-03 RX ORDER — SODIUM CHLORIDE 0.9 % (FLUSH) 0.9 %
20 SYRINGE (ML) INJECTION AS NEEDED
Status: DISCONTINUED | OUTPATIENT
Start: 2020-08-03 | End: 2020-08-06 | Stop reason: HOSPADM

## 2020-08-03 RX ADMIN — SODIUM CHLORIDE, PRESERVATIVE FREE 10 ML: 5 INJECTION INTRAVENOUS at 20:54

## 2020-08-03 RX ADMIN — INSULIN DETEMIR 10 UNITS: 100 INJECTION, SOLUTION SUBCUTANEOUS at 20:55

## 2020-08-03 RX ADMIN — ASPIRIN 325 MG ORAL TABLET 325 MG: 325 PILL ORAL at 08:52

## 2020-08-03 RX ADMIN — ATORVASTATIN CALCIUM 80 MG: 40 TABLET, FILM COATED ORAL at 20:54

## 2020-08-03 RX ADMIN — INSULIN LISPRO 6 UNITS: 100 INJECTION, SOLUTION INTRAVENOUS; SUBCUTANEOUS at 18:02

## 2020-08-03 RX ADMIN — INSULIN LISPRO 2 UNITS: 100 INJECTION, SOLUTION INTRAVENOUS; SUBCUTANEOUS at 08:53

## 2020-08-03 RX ADMIN — CLOPIDOGREL BISULFATE 75 MG: 75 TABLET ORAL at 08:52

## 2020-08-03 RX ADMIN — LEVOTHYROXINE SODIUM 88 MCG: 88 TABLET ORAL at 05:43

## 2020-08-03 RX ADMIN — SODIUM CHLORIDE, PRESERVATIVE FREE 10 ML: 5 INJECTION INTRAVENOUS at 08:52

## 2020-08-03 NOTE — PROGRESS NOTES
Neurology Follow Up Note    HOD # (5)  CVA S/P thrombectomy for right M2 occlusion     No events last night  The patient continues to be lethargic however she awakens easily and follows commands.  Her neurological exam has improved, now squeezing with left hand.  Daughter in law is currently at the bedside and reports she was having trouble eating, pocketing food. Repeat speech eval is planned.      AIS     H/O grade II follicular lymphoma     Hypertension    Dyslipidemia    Hypothyroidism    T2DM     Smoker      Temp:  [97.7 °F (36.5 °C)-98.1 °F (36.7 °C)] 98.1 °F (36.7 °C)  Heart Rate:  [] 89  Resp:  [18-20] 18  BP: (158-188)/(63-75) 158/66      Intake/Output Summary (Last 24 hours) at 8/3/2020 1043  Last data filed at 8/3/2020 0712  Gross per 24 hour   Intake --   Output 800 ml   Net -800 ml       Vital signs, labs, and pertinent tests were reviewed.    24 hour post-thrombectomy CT scan revealed evolving right frontoparietal infarct, negative for hemorrhage.    Echocardiogram: EF 55%, negative for left atrial enlargement and/or PFO. Patient remains in NSR (with PVCs) on cardiac monitor - no documented atrial fibrillation.    Total Cholesterol 135, LDL 52, HDL 57    Last Documented NIHSS  Interval: handoff  1a. Level of Consciousness: 0-->Alert, keenly responsive  1b. LOC Questions: 0-->Answers both questions correctly  1c. LOC Commands: 0-->Performs both tasks correctly     3. Visual: 1-->Partial hemianopia  4. Facial Palsy: 2-->Partial paralysis (total or near-total paralysis of lower face)  5a. Motor Arm, Left: 2-->Some effort against gravity, limb cannot get to or maintain (if cued) 90 (or 45) degrees, drifts down to bed, but has some effort against gravity  5b. Motor Arm, Right: 0-->No drift, limb holds 90 (or 45) degrees for full 10 secs  6a. Motor Leg, Left: 1-->Drift, leg falls by the end of the 5-sec period but does not hit bed  6b. Motor Leg, Right: 0-->No drift, leg holds 30 degree position for  full 5 secs  7. Limb Ataxia: 0-->Absent  8. Sensory: 2-->Severe to total sensory loss, patient is not aware of being touched in the face, arm, and leg  9. Best Language: 0-->No aphasia, normal  10. Dysarthria: 1-->Mild-to-moderate dysarthria, patient slurs at least some words and, at worst, can be understood with some difficulty  11. Extinction and Inattention (formerly Neglect): 2-->Profound bishop-inattention/extinction more than 1 modality    Total (NIH Stroke Scale): 12    EXAM    The patient is drowsy but awakens easily to verbal stimuli.  She is oriented and follows commands.  Pupils are equal and round.  She continues to have partial gaze palsy but is able to cross midline with encouragement.  Left homonymous hemianopia is present.  Left facial droop is present.  Her speech is dysarthric with no evidence of aphasia.  She has FROM of her right upper and lower extremity.  She is able to lift her left upper and lower extremity against gravity and sustain without drift (upper extremity more difficult than lower extremity).  Decreased sensation present in left upper and lower extremity and left face. She denies pain at this time.    Pulm: effort normal  Cardio: rate normal  Skin: warm and dry  Psych: pleasant, cooperative. Not agitated.  HEENT: atraumatic  General: No acute distress, lethargic    PLAN    #1.  Right middle cerebral artery stroke.  Could be atheroembolic from her significant atherosclerotic disease extracranially, versus cardioembolic given her advancing age.  Will recommend cardiac monitor at discharge.  Echocardiogram is negative for left atrial enlargement and/or PFO.  24-hour CT head was negative for hemorrhage. Continue on Plavix 75 mg in addition to her ASA 325mg. Discussed with Dr Ly at bedside, will repeat CT head given persistent somnolence.  Repeat speech evaluation for swallowing.  #2.  Essential hypertension.  Continue permissive hypertension 140-180 for now; patient's symptoms are  better with increased blood pressures.   #3.  Cerebral atherosclerosis.  Patient has significant atherosclerotic disease extracranially more than intracranially.  DAPT and high intensity statin.  #4.  Diabetes mellitus type 2.  A1c 7.3.  Management per hospitalist.  Diabetes educator to see.  #5.  Smoking.  Discussed smoking cessation with family yesterday.  Will  patient when she is more awake.  #5.  Activity.  Ok for PT/OT to work with patient.  #6.  Diet. Patient currently NPO. Scheduled to have FEES later today.    I have reviewed the patient's imaging and discussed the plan of care with the patients daughter in law who is currently at the bedside.  She is in agreement and all questions have been answered.    I have discussed with Dr Ly and Dr Chen.    BRITTNEE Olivera Extender/Stroke Navigator

## 2020-08-03 NOTE — PLAN OF CARE
Problem: Patient Care Overview  Goal: Plan of Care Review  Outcome: Ongoing (interventions implemented as appropriate)  Flowsheets (Taken 8/3/2020 7309)  Progress: no change  Plan of Care Reviewed With: patient;family  Note:   WOC nurse consulted to assess inner thigh and left labial skin. Bilateral medial thighs, labia and buttocks skin all pink, intact, blanchable; Z-guard applied to buttocks. See skin orders. Pt on waffle mattress; off loading heel boots in place. WOC nurse will s/o at this time. Please contact WOC nurse as needed for concerns.

## 2020-08-03 NOTE — PLAN OF CARE
Problem: Patient Care Overview  Goal: Plan of Care Review  Outcome: Ongoing (interventions implemented as appropriate)  Flowsheets (Taken 8/3/2020 5231)  Plan of Care Reviewed With: patient; daughter  Note:   SLP evaluation and treatment completed. Will continue to address dysphagia, speech and cognition in treatment. Please see note for further details and recommendations.

## 2020-08-03 NOTE — PLAN OF CARE
Problem: Patient Care Overview  Goal: Plan of Care Review  Outcome: Ongoing (interventions implemented as appropriate)  Flowsheets (Taken 8/3/2020 7671)  Progress: improving (Pended)  Plan of Care Reviewed With: patient;son (Pended)  Outcome Summary: Pt demonstrated improved functional mobility, requiring MinAx1 for bed mobility and ambulated 8 ft with ModAx2 and B handheld assist. Pt continues to be limited by fatigue and decreased postural stability, but tolerated activities well today. Discharge recommendation continues to be IRF. (Pended)

## 2020-08-03 NOTE — THERAPY RE-EVALUATION
Acute Care - Speech Language Pathology Treatment Note  [facility]     Patient Name: Marquez Myrick  : 1940  MRN: [id number]  Today's Date: 8/3/2020         Admit Date: 2020    Visit Dx:      ICD-10-CM ICD-9-CM   1. Acute ischemic stroke (CMS/HCC) I63.9 434.91   2. Dysarthria R47.1 784.51   3. Impaired mobility and ADLs Z74.09 V49.89    Z78.9    4. Oropharyngeal dysphagia R13.12 787.22   5. Cognitive communication deficit R41.841 799.52     Patient Active Problem List   Diagnosis   • H/O grade II follicular lymphoma    • Hypertension   • Dyslipidemia   • Hypothyroidism   • T2DM    • AIS    • Smoker        Therapy Treatment  Rehabilitation Treatment Summary     Row Name 20 1330             Treatment Time/Intention    Discipline  speech language pathologist  -CH      Document Type  evaluation;therapy note (daily note)  -CH      Mode of Treatment  individual therapy;speech-language pathology  -      Therapy Frequency (SLP SLC)  5 days per week  -CH      Recorded by [CH] Brianna Smith MS CCC-SLP 20      Row Name                Wound 20 Left labia Blisters    Wound - Properties Group Date first assessed: 20 [DS] Time first assessed:  [DS2] Present on Hospital Admission: N [DS] Side: Left [DS] Location: labia [DS] Primary Wound Type: Blisters [DS], one fluid filled blister  Recorded by:  [DS] Alison Turcios RN 20 [DS2] Alison Turcios RN 20    Row Name                Wound 20 Bilateral anterior thigh Pressure Injury    Wound - Properties Group Date first assessed: 20 [DS] Time first assessed:  [DS] Present on Hospital Admission: N [DS] Side: Bilateral [DS] Orientation: anterior [DS] Location: thigh [DS] Primary Wound Type: Pressure inj [DS] Stage, Pressure Injury: Stage 1 [DS] Recorded by:  [DS] Alison Turcios RN 20    Row Name 20             Outcome Summary/Treatment Plan (SLP)    Daily Summary  of Progress (SLP)  progress toward functional goals as expected  -CH      Plan for Continued Treatment (SLP)  Continue to follow to address dysphagia, speech and cognition  -CH      Recorded by [CH] Brianna Smith, MS CCC-SLP 08/03/20 1627        User Key  (r) = Recorded By, (t) = Taken By, (c) = Cosigned By    Initials Name Effective Dates Discipline    CH Brianna Smith, MS CCC-SLP 02/14/19 -  SLP    Alison Fraser RN 06/20/20 -  Nurse          EDUCATION  The patient has been educated in the following areas:   Cognitive Impairment Communication Impairment.    SLP Recommendation and Plan  Daily Summary of Progress (SLP): progress toward functional goals as expected     Plan for Continued Treatment (SLP): Continue to follow to address dysphagia, speech and cognition  Anticipated Dischage Disposition (SLP): inpatient rehabilitation facility, anticipate therapy at next level of care             SLP GOALS     Row Name 08/03/20 1330 08/02/20 0930 08/01/20 0800       Oral Nutrition/Hydration Goal 1 (SLP)    Oral Nutrition/Hydration Goal 1, SLP  LTG: Tolerate soft diet with nectar-thick liquids without s/s aspiration with 100% accuracy and no cues  -CH  LTG: Tolerate soft diet with nectar-thick liquids without s/s aspiration with 100% accuracy and no cues  -SM  --    Time Frame (Oral Nutrition/Hydration Goal 1, SLP)  by discharge  -CH  by discharge  -SM  --    Progress/Outcomes (Oral Nutrition/Hydration Goal 1, SLP)  continuing progress toward goal  -CH  --  --       Oral Nutrition/Hydration Goal 2 (SLP)    Oral Nutrition/Hydration Goal 2, SLP  Tolerate ice chips and thin H2O without s/s aspiration (wet vocal quality, cough, often delayed) with 80% accuracy and no cues.  -CH  Tolerate ice chips and thin H2O without s/s aspiration (wet vocal quality, cough, often delayed) with 80% accuracy and no cues.  -SM  --    Time Frame (Oral Nutrition/Hydration Goal 2, SLP)  short term goal (STG)  -CH  short term goal (STG)   -SM  --       Labial Strengthening Goal 1 (SLP)    Activity (Labial Strengthening Goal 1, SLP)  increase labial tone  -CH  increase labial tone  -SM  --    Increase Labial Tone  labial resistance exercises;swallow trials  -  labial resistance exercises;swallow trials  -SM  --    [place]/Accuracy (Labial Strengthening Goal 1, SLP)  independently (over 90% accuracy)  -CH  independently (over 90% accuracy)  -SM  --    Time Frame (Labial Strengthening Goal 1, SLP)  short term goal (STG)  -  short term goal (STG)  -SM  --       Lingual Strengthening Goal 1 (SLP)    Activity (Lingual Strengthening Goal 1, SLP)  increase lingual tone/sensation/control/coordination/movement;increase tongue back strength  -CH  increase lingual tone/sensation/control/coordination/movement;increase tongue back strength  -SM  --    Increase Lingual Tone/Sensation/Control/Coordination/Movement  swallow trials;lingual resistance exercises  -  swallow trials;lingual resistance exercises  -SM  --    Increase Tongue Back Strength  lingual resistance exercises  -  lingual resistance exercises  -SM  --    [place]/Accuracy (Lingual Strengthening Goal 1, SLP)  independently (over 90% accuracy)  -CH  independently (over 90% accuracy)  -SM  --    Time Frame (Lingual Strengthening Goal 1, SLP)  short term goal (STG)  -  short term goal (STG)  -SM  --       Pharyngeal Strengthening Exercise Goal 1 (SLP)    Activity (Pharyngeal Strengthening Goal 1, SLP)  increase timing;increase anterior movement of the hyolaryngeal complex;increase squeeze/positive pressure generation  -  increase timing;increase anterior movement of the hyolaryngeal complex;increase squeeze/positive pressure generation  -SM  --    Increase Timing  prepping - 3 second prep or suck swallow or 3-step swallow;supraglottic swallow  -  prepping - 3 second prep or suck swallow or 3-step swallow;supraglottic swallow  -SM  --    Increase Anterior Movement of the  Hyolaryngeal Complex  chin tuck against resistance (CTAR)  -CH  chin tuck against resistance (CTAR)  -  --    Increase Squeeze/Positive Pressure Generation  hard effortful swallow  -  hard effortful swallow  -SM  --    [place]/Accuracy (Pharyngeal Strengthening Goal 1, SLP)  independently (over 90% accuracy)  -CH  independently (over 90% accuracy)  -SM  --    Time Frame (Pharyngeal Strengthening Goal 1, SLP)  short term goal (STG)  -  short term goal (STG)  -SM  --       Swallow Management Recall Goal 1 (SLP)    Activity (Swallow Management Recall Goal 1, SLP)  safe diet/liquid level;compensatory swallow strategies/techniques;rationale for use of strategies/techniques  -  safe diet/liquid level;compensatory swallow strategies/techniques;rationale for use of strategies/techniques  -  --    [place]/Accuracy (Swallow Management Recall Goal 1, SLP)  independently (over 90% accuracy)  -CH  independently (over 90% accuracy)  -SM  --    Time Frame (Swallow Management Recall Goal 1, SLP)  short term goal (STG)  -  short term goal (STG)  -SM  --       Swallow Compensatory Strategies Goal 1 (SLP)    Activity (Swallow Compensatory Strategies/Techniques Goal 1, SLP)  compensatory strategies;during meal intake;during p.o. trials;small bites;small cup sips;food/liquid placed on stronger right side;alternate food/liquid intake  -  compensatory strategies;during meal intake;during p.o. trials;small bites;small cup sips;small straw sips  -  --    [place]/Accuracy (Swallow Compensatory Strategies/Techniques Goal 1, SLP)  independently (over 90% accuracy)  -CH  independently (over 90% accuracy)  -SM  --    Time Frame (Swallow Compensatory Strategies/Techniques Goal 1, SLP)  short term goal (STG)  -  short term goal (STG)  -SM  --    Progress/Outcomes (Swallow Compensatory Strategies/Techniques Goal 1, SLP)  goal revised this date  -CH  --  --       Articulation Goal 1 (SLP)    Improve Articulation Goal  1 (SLP)  by over-articulating at word level;80%;with minimal cues (75-90%)  -CH  by over-articulating at word level;80%;with minimal cues (75-90%)  -SM  by over-articulating at word level;80%;with minimal cues (75-90%)  -SM    Time Frame (Articulation Goal 1, SLP)  short term goal (STG)  -CH  short term goal (STG)  -SM  short term goal (STG)  -SM    Progress (Articulation Goal 1, SLP)  50%;with moderate cues (50-74%)  -CH  --  --    Progress/Outcomes (Articulation Goal 1, SLP)  continuing progress toward goal  -CH  goal ongoing  -SM  --       Attention Goal 1 (SLP)    Improve Attention by Goal 1 (SLP)  complete sustained attention task;100%;with minimal cues (75-90%)  -CH  complete sustained attention task;100%;with minimal cues (75-90%)  -SM  complete sustained attention task;100%;with minimal cues (75-90%)  -SM    Time Frame (Attention Goal 1, SLP)  short term goal (STG)  -CH  short term goal (STG)  -SM  short term goal (STG)  -SM    Progress (Attention Goal 1, SLP)  90%;with moderate cues (50-74%)  -CH  90%;with moderate cues (50-74%)  -SM  --    Progress/Outcomes (Attention Goal 1, SLP)  good progress toward goal  -CH  good progress toward goal  -SM  --       Right Hemisphere Function Goal 1 (SLP)    Improve Right Hemisphere Function Through Goal 1 (SLP)  demonstrate awareness of communication partner in left visual field;use compensatory strategies for left neglect;identify physical/cognitive strengths and limitations;70%;with moderate cues (50-74%)  -CH  demonstrate awareness of communication partner in left visual field;use compensatory strategies for left neglect;identify physical/cognitive strengths and limitations;70%;with moderate cues (50-74%)  -SM  demonstrate awareness of communication partner in left visual field;use compensatory strategies for left neglect;identify physical/cognitive strengths and limitations;70%;with moderate cues (50-74%)  -SM    Time Frame (Right Hemisphere Function Goal 1, SLP)   short term goal (STG)  -CH  short term goal (STG)  -SM  short term goal (STG)  -SM    Progress (Right Hemisphere Function Goal 1, SLP)  80%;with minimal cues (75-90%)  -CH  50%;with minimal cues (75-90%)  -SM  --    Progress/Outcomes (Right Hemisphere Function Goal 1, SLP)  good progress toward goal  -CH  good progress toward goal  -SM  --    Comment (Right Hemisphere Function Goal 1, SLP)  --  targeted only L neglect tasks - attending to stimuli on midline to slightly left side.   -SM  --       Additional Goal 1 (SLP)    Additional Goal 1, SLP  LTG: Improve cog-comm skills in order to participate in care while in hospital setting with 100% accuracy and min cues  -CH  LTG: Improve cog-comm skills in order to participate in care while in hospital setting with 100% accuracy and min cues  -SM  LTG: Improve cog-comm skills in order to participate in care while in hospital setting with 100% accuracy and min cues  -SM    Time Frame (Additional Goal 1, SLP)  by discharge  -CH  by discharge  -SM  --    Progress/Outcomes (Additional Goal 1, SLP)  good progress toward goal  -CH  good progress toward goal  -SM  --      User Key  (r) = Recorded By, (t) = Taken By, (c) = Cosigned By    Initials Name Provider Type    Lisseth Martinez MS CCC-SLP Speech and Language Pathologist    Brianna Rosario MS CCC-SLP Speech and Language Pathologist              Time Calculation:     Time Calculation- SLP     Row Name 08/03/20 1627             Time Calculation- [facility]    SLP Start Time  1330  -      SLP Received On  08/03/20  -        User Key  (r) = Recorded By, (t) = Taken By, (c) = Cosigned By    Initials Name Provider Type    Brianna Rosario MS CCC-SLP Speech and Language Pathologist          Therapy Charges for Today     Code Description Service Date Service Provider Modifiers Qty    [id number]  ST EVAL ORAL PHARYNG SWALLOW 4 8/3/2020 Brianna Smith MS CCC-SLP GN 1    [id number]  ST TREATMENT SPEECH 3  8/3/2020 Brianna Smith, MS CCC-SLP GN 1                     Brianna Luis MS CCC-SLP  8/3/2020   and Acute Care - Speech Language Pathology   Swallow Re-Evaluation [facility]   Clinical Swallow Evaluation       Patient Name: Marquez Myrick  : 1940  MRN: [id number]  Today's Date: 8/3/2020  Onset of Illness/Injury or Date of Surgery: 20     Referring Physician: BRITTNEE Coyle      Admit Date: 2020    Visit Dx:     ICD-10-CM ICD-9-CM   1. Acute ischemic stroke (CMS/[facility]) I63.9 434.91   2. Dysarthria R47.1 784.51   3. Impaired mobility and ADLs Z74.09 V49.89    Z78.9    4. Oropharyngeal dysphagia R13.12 787.22   5. Cognitive communication deficit R41.841 799.52     Patient Active Problem List   Diagnosis   • H/O grade II follicular lymphoma    • Hypertension   • Dyslipidemia   • Hypothyroidism   • T2DM    • AIS    • Smoker     Past Medical History:   Diagnosis Date   • Dyslipidemia 2020   • Hypertension 2020   • Hypothyroidism 2020     Past Surgical History:   Procedure Laterality Date   • APPENDECTOMY     • CHOLECYSTECTOMY  2005   • COLON SURGERY Right 2005    R colon resection    • DEEP NECK LYMPH NODE BIOPSY / EXCISION     • HYSTERECTOMY     • PORTACATH PLACEMENT Left     L subclavian         SWALLOW EVALUATION (last 72 hours)      SLP Adult Swallow Evaluation     Row Name 20 1330 20 0930 20 1420 20 0800          Rehab Evaluation    Document Type  --  evaluation  -SM  re-evaluation  -  evaluation  -SM     Subjective Information  no complaints  -CH  no complaints  -SM  --  no complaints  -SM     Patient Observations  alert;cooperative  -CH  alert;cooperative  -SM  lethargic;cooperative  -SM  lethargic;cooperative  -SM     Patient/Family Observations  Dtr present  -CH  Dtr present at end of session, education provided  -SM  Son present  -SM  Dtr present  -SM     Patient Effort  adequate  -CH  --  --  --        General Information    Patient  "Profile Reviewed  yes  -CH  --  --  yes  -SM     Pertinent History Of Current Problem  R frontal infarct, s/p mechanical thrombectomy. FEES 8/2 with recommendations for lvl 3, NT liquids/small sips. RE-consult received as patients family reported increased difficulty with swallowing.   -CH  --  --  R frontal infarct, s/p mechanical thrombectomy  -     Current Method of Nutrition  NPO  -  --  NPO  -SM  NPO  -SM     Precautions/Limitations, Vision  WFL;for purposes of eval  -CH  --  --  --     Precautions/Limitations, Hearing  WFL;for purposes of eval  -CH  --  --  --     Prior Level of Function-Swallowing  safe, efficient swallowing in all situations  -  --  --  safe, efficient swallowing in all situations  -     Plans/Goals Discussed with  patient and family;agreed upon  -  patient and family;agreed upon  -  --  patient and family;agreed upon  -     Barriers to Rehab  none identified  -  none identified  -  --  none identified  -     Patient's Goals for Discharge  --  -- \"water\"  -  --  return to PO diet  -     Family Goals for Discharge  family did not state  -  family did not state  -  --  patient able to return to PO diet;patient able to eat/drink without coughing/choking  -        Pain Assessment    Additional Documentation  Pain Scale: FACES Pre/Post-Treatment (Group)  -  --  --  Pain Scale: Numbers Pre/Post-Treatment (Group)  -        Pain Scale: Numbers Pre/Post-Treatment    Pain Scale: Numbers, Pretreatment  0/10 - no pain  -  0/10 - no pain  -  --  0/10 - no pain  -     Pain Scale: Numbers, Post-Treatment  0/10 - no pain  -  0/10 - no pain  -  --  0/10 - no pain  -        Pain Scale: FACES Pre/Post-Treatment    Pain: FACES Scale, Pretreatment  0-->no hurt  -  --  --  --     Pain: FACES Scale, Post-Treatment  0-->no hurt  -CH  --  --  --        Oral Motor and Function    Volitional Swallow  WFL  -  --  --  --        Oral Musculature and Cranial Nerve " Assessment    Oral Labial or Buccal Impairment, Detail, Cranial Nerve VII (Facial):  left labial droop  -  --  --  left labial droop  -SM     Lingual Impairment, Detail. Cranial Nerves IX, XII (Glossopharyngeal and Hypoglossal)  reduced strength left  -CH  --  --  reduced strength left  -        General Eating/Swallowing Observations    Eating/Swallowing Skills  fed by SLP  -  --  --  --     Positioning During Eating  upright 90 degree;upright in bed  -  --  --  --     Utensils Used  spoon;cup  -  --  --  --     Consistencies Trialed  soft textures;pureed with some mashed;pureed;nectar/syrup-thick liquids ice chips  -  --  --  --        Clinical Swallow Eval    Oral Prep Phase  impaired  -CH  --  --  impaired  -SM     Oral Transit  impaired  -CH  --  --  impaired  -SM     Pharyngeal Phase  suspected pharyngeal impairment  -CH  --  --  suspected pharyngeal impairment  -     Clinical Swallow Evaluation Summary  CSE completed. Patient given trials of ice, NT liquids, pureed and soft solids. Delayed cough with ice. Prolonged oral phase/inefficient mastication with soft solid trials which required manual removal. L buccal pocketing with soft solids and pureed. Patient able to utilize lingual sweep and alternation of liquids and solids to clear. No s/s of aspiration with pureed trials or with NT liquids via teaspoon or small single cup sips. Recommend pureed diet and NT liquids (no straws) via sm cup sips, meds crushed in pureed. Bolus to be presented on patients stronger right side.   -CH  --  Lethargic though when cued to alert, showing no s/s aspiraiton with nectar-thick liquids, pudding, or solids - though prolonged manipulation with solids. Will initiate modified PO diet and arrange for FEES tomorrow, 8/2. Allow PO intake only when alert. RN in agreement.   -SM  --        Oral Prep Concerns    Oral Prep Concerns  prolonged mastication;inefficient mastication;increased prep time;bolus removed from mouth  manually  -CH  --  --  prolonged mastication;inefficient mastication  -SM     Prolonged Mastication  mechanical soft  -CH  --  --  --     Inefficient Mastication  mechanical soft  -CH  --  --  --     Increased Prep Time  pudding;mechanical soft  -CH  --  --  --     Bolus Removed from Mouth Manually  mechanical soft  -CH  --  --  --        Oral Transit Concerns    Oral Transit Concerns  increased oral transit time  -CH  --  --  increased oral transit time  -SM     Increased Oral Transit Time  mechanical soft;pudding  -CH  --  --  --        Pharyngeal Phase Concerns    Pharyngeal Phase Concerns  cough  -CH  --  --  cough  -SM     Cough  other (see comments) ice  -CH  --  --  thin;other (see comments) with large sip and as liquid wash  -SM        Fiberoptic Endoscopic Evaluation of Swallowing (FEES)    Risks/Benefits Reviewed  --  risks/benefits explained;patient;agreed to eval  -SM  --  --     Nasal Entry  --  left:  -SM  --  --     Special Considerations  --  Scope #338  -SM  --  --        Anatomy and Physiology    Velopharyngeal  --  WFL  -SM  --  --     Base of Tongue  --  symmetrical  -SM  --  --     Epiglottis  --  WFL  -SM  --  --     Laryngeal Function Breathing  --  symmetrical  -SM  --  --     Laryngeal Function Phonation  --  asymmetrical  -SM  --  --     Laryngeal Function to Breath Hold  --  TVF/FVF/Arytenoid;sustained closure  -SM  --  --     Secretion Rating Scale (Jose Angel et alDorene 1996)  --  0- normal, no visible secretions  -SM  --  --     Secretion Description  --  thin;clear  -SM  --  --     Spontaneous Swallow  --  frequency adequate  -SM  --  --     Sensory  --  sensed scope  -SM  --  --     Utensils Used  --  Spoon;Cup;Straw  -SM  --  --     Consistencies Trialed  --  thin liquids;nectar-thick liquids;pudding/puree;Regular textures  -SM  --  --        FEES Interpretation    Oral Phase  --  reduced lingual control;prolonged manipulation;increased A-P transit time;prespill of liquids into pharynx;oral  residue present  -SM  --  --        Initiation of Pharyngeal Swallow    Initiation of Pharyngeal Swallow  --  bolus in pyriform sinuses  -SM  --  --     Pharyngeal Phase  --  impaired pharyngeal phase of swallowing  -SM  --  --     Aspiration After the Swallow  --  thin liquids;secondary to residue;in pyriform sinuses;other (see comments) with some pooling or oral residue to pyriforms  -SM  --  --     Response to Aspiration  --  reflexive response;inconsistent response;cleared subglottic material;other (see comments) no sensation just below folds, cough as deepened  -SM  --  --     Rosenbek's Scale  --  thin:;8-->Level 8  -SM  --  --     Residue  --  regular Textures;valleculae;secondary to reduced base of tongue retraction;thin liquids;pyriform sinuses;secondary to reduced hyolaryngeal excursion some solids residual coating on posterior commisure  -SM  --  --     Response to Residue  --  cleared residue;with liquid wash  -SM  --  --     Attempted Compensatory Maneuvers  --  bolus size;bolus presentation style  -SM  --  --     Response to Attempted Compensatory Maneuvers  --  did not prevent aspiration  -SM  --  --        Clinical Impression    SLP Swallowing Diagnosis  moderate;oral dysfunction;pharyngeal dysfunction  -CH  moderate;oral dysfunction;pharyngeal dysfunction  -SM  moderate;oral dysfunction;suspected pharyngeal dysfunction  -SM  moderate;oral dysfunction;suspected pharyngeal dysfunction  -SM     Functional Impact  risk of aspiration/pneumonia  -CH  risk of aspiration/pneumonia  -  risk of aspiration/pneumonia  -  risk of aspiration/pneumonia  -SM     Rehab Potential/Prognosis, Swallowing  good, to achieve stated therapy goals  -CH  good, to achieve stated therapy goals  -SM  --  --     Swallow Criteria for Skilled Therapeutic Interventions Met  demonstrates skilled criteria  -  demonstrates skilled criteria  -  --  --        Recommendations    Therapy Frequency (Swallow)  5 days per week  -  5  days per week  -  --  --     Predicted Duration Therapy Intervention (Days)  until discharge  -  until discharge  -  --  --     SLP Diet Recommendation  puree;nectar thick liquids  -  puree with some mashed;nectar thick liquids  -  puree with some mashed;nectar thick liquids  -  NPO;other (see comments) ok for ice chips  -     Recommended Diagnostics  --  --  FEES 8/2  -  VFSS (MBS)  -     Recommended Precautions and Strategies  upright posture during/after eating;small bites of food and sips of liquid  -  upright posture during/after eating;small bites of food and sips of liquid  -  upright posture during/after eating;small bites of food and sips of liquid;other (see comments) intake only when alert.   -  upright posture during/after eating  -     SLP Rec. for Method of Medication Administration  meds whole;meds crushed;with pudding or applesauce;as tolerated  -  meds whole;meds crushed;with pudding or applesauce;as tolerated  -  meds whole;meds crushed;with pudding or applesauce;as tolerated  -  meds whole;meds crushed;with pudding or applesauce  -     Monitor for Signs of Aspiration  yes;notify SLP if any concerns  -  --  yes;notify SLP if any concerns  -  --     Anticipated Dischage Disposition (SLP)  inpatient rehabilitation facility;anticipate therapy at next level of care  -  inpatient rehabilitation facility;anticipate therapy at next level of care  -  --  --       User Key  (r) = Recorded By, (t) = Taken By, (c) = Cosigned By    Initials Name Effective Dates     Lisseth Suh MS CCC-SLP 06/22/15 -      Brianna Smith MS CCC-SLP 02/14/19 -           EDUCATION  The patient has been educated in the following areas:   Home Exercise Program (HEP) Dysphagia (Swallowing Impairment) Modified Diet Instruction.    SLP Recommendation and Plan  SLP Swallowing Diagnosis: moderate, oral dysfunction, pharyngeal dysfunction  SLP Diet Recommendation: puree, nectar  thick liquids  Recommended Precautions and Strategies: upright posture during/after eating, small bites of food and sips of liquid  SLP Rec. for Method of Medication Administration: meds whole, meds crushed, with pudding or applesauce, as tolerated     Monitor for Signs of Aspiration: yes, notify SLP if any concerns     Swallow Criteria for Skilled Therapeutic Interventions Met: demonstrates skilled criteria  Anticipated Dischage Disposition (SLP): inpatient rehabilitation facility, anticipate therapy at next level of care  Rehab Potential/Prognosis, Swallowing: good, to achieve stated therapy goals  Therapy Frequency (Swallow): 5 days per week  Predicted Duration Therapy Intervention (Days): until discharge            SLP GOALS     Row Name 08/03/20 1330 08/02/20 0930 08/01/20 0800       Oral Nutrition/Hydration Goal 1 (SLP)    Oral Nutrition/Hydration Goal 1, SLP  LTG: Tolerate soft diet with nectar-thick liquids without s/s aspiration with 100% accuracy and no cues  -CH  LTG: Tolerate soft diet with nectar-thick liquids without s/s aspiration with 100% accuracy and no cues  -SM  --    Time Frame (Oral Nutrition/Hydration Goal 1, SLP)  by discharge  -CH  by discharge  -SM  --    Progress/Outcomes (Oral Nutrition/Hydration Goal 1, SLP)  continuing progress toward goal  -CH  --  --       Oral Nutrition/Hydration Goal 2 (SLP)    Oral Nutrition/Hydration Goal 2, SLP  Tolerate ice chips and thin H2O without s/s aspiration (wet vocal quality, cough, often delayed) with 80% accuracy and no cues.  -CH  Tolerate ice chips and thin H2O without s/s aspiration (wet vocal quality, cough, often delayed) with 80% accuracy and no cues.  -SM  --    Time Frame (Oral Nutrition/Hydration Goal 2, SLP)  short term goal (STG)  -CH  short term goal (STG)  -SM  --       Labial Strengthening Goal 1 (SLP)    Activity (Labial Strengthening Goal 1, SLP)  increase labial tone  -CH  increase labial tone  -SM  --    Increase Labial Tone  labial  resistance exercises;swallow trials  -  labial resistance exercises;swallow trials  -SM  --    [place]/Accuracy (Labial Strengthening Goal 1, SLP)  independently (over 90% accuracy)  -CH  independently (over 90% accuracy)  -SM  --    Time Frame (Labial Strengthening Goal 1, SLP)  short term goal (STG)  -  short term goal (STG)  -SM  --       Lingual Strengthening Goal 1 (SLP)    Activity (Lingual Strengthening Goal 1, SLP)  increase lingual tone/sensation/control/coordination/movement;increase tongue back strength  -  increase lingual tone/sensation/control/coordination/movement;increase tongue back strength  -SM  --    Increase Lingual Tone/Sensation/Control/Coordination/Movement  swallow trials;lingual resistance exercises  -  swallow trials;lingual resistance exercises  -SM  --    Increase Tongue Back Strength  lingual resistance exercises  -  lingual resistance exercises  -SM  --    [place]/Accuracy (Lingual Strengthening Goal 1, SLP)  independently (over 90% accuracy)  -  independently (over 90% accuracy)  -SM  --    Time Frame (Lingual Strengthening Goal 1, SLP)  short term goal (STG)  -  short term goal (STG)  -SM  --       Pharyngeal Strengthening Exercise Goal 1 (SLP)    Activity (Pharyngeal Strengthening Goal 1, SLP)  increase timing;increase anterior movement of the hyolaryngeal complex;increase squeeze/positive pressure generation  -  increase timing;increase anterior movement of the hyolaryngeal complex;increase squeeze/positive pressure generation  -SM  --    Increase Timing  prepping - 3 second prep or suck swallow or 3-step swallow;supraglottic swallow  -  prepping - 3 second prep or suck swallow or 3-step swallow;supraglottic swallow  -SM  --    Increase Anterior Movement of the Hyolaryngeal Complex  chin tuck against resistance (CTAR)  -  chin tuck against resistance (CTAR)  -SM  --    Increase Squeeze/Positive Pressure Generation  hard effortful swallow  -  hard  effortful swallow  -SM  --    [place]/Accuracy (Pharyngeal Strengthening Goal 1, SLP)  independently (over 90% accuracy)  -CH  independently (over 90% accuracy)  -SM  --    Time Frame (Pharyngeal Strengthening Goal 1, SLP)  short term goal (STG)  -CH  short term goal (STG)  -SM  --       Swallow Management Recall Goal 1 (SLP)    Activity (Swallow Management Recall Goal 1, SLP)  safe diet/liquid level;compensatory swallow strategies/techniques;rationale for use of strategies/techniques  -CH  safe diet/liquid level;compensatory swallow strategies/techniques;rationale for use of strategies/techniques  -SM  --    [place]/Accuracy (Swallow Management Recall Goal 1, SLP)  independently (over 90% accuracy)  -CH  independently (over 90% accuracy)  -SM  --    Time Frame (Swallow Management Recall Goal 1, SLP)  short term goal (STG)  -CH  short term goal (STG)  -SM  --       Swallow Compensatory Strategies Goal 1 (SLP)    Activity (Swallow Compensatory Strategies/Techniques Goal 1, SLP)  compensatory strategies;during meal intake;during p.o. trials;small bites;small cup sips;food/liquid placed on stronger right side;alternate food/liquid intake  -  compensatory strategies;during meal intake;during p.o. trials;small bites;small cup sips;small straw sips  -SM  --    [place]/Accuracy (Swallow Compensatory Strategies/Techniques Goal 1, SLP)  independently (over 90% accuracy)  -CH  independently (over 90% accuracy)  -SM  --    Time Frame (Swallow Compensatory Strategies/Techniques Goal 1, SLP)  short term goal (STG)  -CH  short term goal (STG)  -SM  --    Progress/Outcomes (Swallow Compensatory Strategies/Techniques Goal 1, SLP)  goal revised this date  -CH  --  --       Articulation Goal 1 (SLP)    Improve Articulation Goal 1 (SLP)  by over-articulating at word level;80%;with minimal cues (75-90%)  -CH  by over-articulating at word level;80%;with minimal cues (75-90%)  -SM  by over-articulating at word  level;80%;with minimal cues (75-90%)  -SM    Time Frame (Articulation Goal 1, SLP)  short term goal (STG)  -CH  short term goal (STG)  -SM  short term goal (STG)  -SM    Progress (Articulation Goal 1, SLP)  50%;with moderate cues (50-74%)  -CH  --  --    Progress/Outcomes (Articulation Goal 1, SLP)  continuing progress toward goal  -CH  goal ongoing  -SM  --       Attention Goal 1 (SLP)    Improve Attention by Goal 1 (SLP)  complete sustained attention task;100%;with minimal cues (75-90%)  -CH  complete sustained attention task;100%;with minimal cues (75-90%)  -SM  complete sustained attention task;100%;with minimal cues (75-90%)  -SM    Time Frame (Attention Goal 1, SLP)  short term goal (STG)  -CH  short term goal (STG)  -SM  short term goal (STG)  -SM    Progress (Attention Goal 1, SLP)  90%;with moderate cues (50-74%)  -CH  90%;with moderate cues (50-74%)  -SM  --    Progress/Outcomes (Attention Goal 1, SLP)  good progress toward goal  -CH  good progress toward goal  -SM  --       Right Hemisphere Function Goal 1 (SLP)    Improve Right Hemisphere Function Through Goal 1 (SLP)  demonstrate awareness of communication partner in left visual field;use compensatory strategies for left neglect;identify physical/cognitive strengths and limitations;70%;with moderate cues (50-74%)  -CH  demonstrate awareness of communication partner in left visual field;use compensatory strategies for left neglect;identify physical/cognitive strengths and limitations;70%;with moderate cues (50-74%)  -SM  demonstrate awareness of communication partner in left visual field;use compensatory strategies for left neglect;identify physical/cognitive strengths and limitations;70%;with moderate cues (50-74%)  -SM    Time Frame (Right Hemisphere Function Goal 1, SLP)  short term goal (STG)  -CH  short term goal (STG)  -SM  short term goal (STG)  -SM    Progress (Right Hemisphere Function Goal 1, SLP)  80%;with minimal cues (75-90%)  -CH  50%;with  minimal cues (75-90%)  -SM  --    Progress/Outcomes (Right Hemisphere Function Goal 1, SLP)  good progress toward goal  -CH  good progress toward goal  -SM  --    Comment (Right Hemisphere Function Goal 1, SLP)  --  targeted only L neglect tasks - attending to stimuli on midline to slightly left side.   -SM  --       Additional Goal 1 (SLP)    Additional Goal 1, SLP  LTG: Improve cog-comm skills in order to participate in care while in hospital setting with 100% accuracy and min cues  -CH  LTG: Improve cog-comm skills in order to participate in care while in hospital setting with 100% accuracy and min cues  -SM  LTG: Improve cog-comm skills in order to participate in care while in hospital setting with 100% accuracy and min cues  -SM    Time Frame (Additional Goal 1, SLP)  by discharge  -  by discharge  -SM  --    Progress/Outcomes (Additional Goal 1, SLP)  good progress toward goal  -CH  good progress toward goal  -SM  --      User Key  (r) = Recorded By, (t) = Taken By, (c) = Cosigned By    Initials Name Provider Type    Lisseth Martinez MS CCC-SLP Speech and Language Pathologist     Brianna Smith MS CCC-SLP Speech and Language Pathologist             Time Calculation:   Time Calculation- SLP     Row Name 08/03/20 1627             Time Calculation- [facility]    SLP Start Time  1330  -      SLP Received On  08/03/20  -        User Key  (r) = Recorded By, (t) = Taken By, (c) = Cosigned By    Initials Name Provider Type    Brianna Rosario MS CCC-SLP Speech and Language Pathologist          Therapy Charges for Today     Code Description Service Date Service Provider Modifiers Qty    37996574560  ST EVAL ORAL PHARYNG SWALLOW 4 8/3/2020 Brianna Smith MS CCC-SLP GN 1    96887703306 HC ST TREATMENT SPEECH 3 8/3/2020 Brianna Smith MS CCC-SLP GN 1               Brianna Smith MS CCC-SLP  8/3/2020     Patient was not wearing a face mask and did not exhibit coughing during this therapy  encounter.  Procedure performed was aerosolizing, did not involve close contact (within 6 feet for at least 15 minutes or longer), and did not involve contact with infectious secretions or specimens.  Therapist used appropriate personal protective equipment including gloves, standard procedure mask and eye protection.  Appropriate PPE was worn during the entire therapy session.  Hand hygiene was completed before and after therapy session.

## 2020-08-03 NOTE — THERAPY TREATMENT NOTE
Patient Name: Marquez Myrick  : 1940    MRN: [id number]                              Today's Date: 8/3/2020       Admit Date: 2020    Visit Dx:     ICD-10-CM ICD-9-CM   1. Acute ischemic stroke (CMS/HCC) I63.9 434.91   2. Dysarthria R47.1 784.51   3. Impaired mobility and ADLs Z74.09 V49.89    Z78.9    4. Oropharyngeal dysphagia R13.12 787.22   5. Cognitive communication deficit R41.841 799.52     Patient Active Problem List   Diagnosis   • H/O grade II follicular lymphoma    • Hypertension   • Dyslipidemia   • Hypothyroidism   • T2DM    • AIS    • Smoker     Past Medical History:   Diagnosis Date   • Dyslipidemia 2020   • Hypertension 2020   • Hypothyroidism 2020     Past Surgical History:   Procedure Laterality Date   • APPENDECTOMY     • CHOLECYSTECTOMY     • COLON SURGERY Right     R colon resection    • DEEP NECK LYMPH NODE BIOPSY / EXCISION     • HYSTERECTOMY     • PORTACATH PLACEMENT Left 2016    L subclavian      General Information     Row Name 20 1528          PT Evaluation Time/Intention    Document Type  therapy note (daily note)  (Pended)   -     Mode of Treatment  physical therapy  (Pended)   -     Row Name 20 1528          General Information    Patient Profile Reviewed?  yes  (Pended)   -     Existing Precautions/Restrictions  fall;oxygen therapy device and L/min  (Pended)   -     Row Name 20 1528          Cognitive Assessment/Intervention- PT/OT    Orientation Status (Cognition)  oriented x 3  (Pended)   -     Cognitive Assessment/Intervention Comment  Pt slightly drowsy, but able to follow commands.  (Pended)   -       User Key  (r) = Recorded By, (t) = Taken By, (c) = Cosigned By    Initials Name Provider Type    J Clarisse Anderson, PT Student PT Student        Mobility     Row Name 20 1529          Bed Mobility Assessment/Treatment    Bed Mobility Assessment/Treatment  supine-sit  (Pended)   -     Supine-Sit  [place] (Bed Mobility)  minimum assist (75% patient effort);verbal cues  (Pended)  VC for sequencing. MinAx1 for postural stability  -JG     Assistive Device (Bed Mobility)  head of bed elevated;draw sheet  (Pended)   -JG     Row Name 08/03/20 1529          Sit-Stand Transfer    Sit-Stand [place] (Transfers)  minimum assist (75% patient effort);2 person assist;verbal cues  (Pended)  MinAx2 for boost and postural stability. VC for upright posture.  -JG     Assistive Device (Sit-Stand Transfers)  other (see comments)  (Pended)  B handheld assist  -JG     Row Name 08/03/20 1529          Gait/Stairs Assessment/Training    Gait/Stairs Assessment/Training  gait/ambulation independence  (Pended)   -JG     [place] Level (Gait)  verbal cues;2 person assist;moderate assist (50% patient effort)  (Pended)  ModAx2 for postural stability and weight shifts  -JG     Assistive Device (Gait)  other (see comments)  (Pended)  B handheld assist  -JG     Distance in Feet (Gait)  8'  (Pended)   -JG     Pattern (Gait)  step-to  (Pended)   -JG     Deviations/Abnormal Patterns (Gait)  left sided deviations;bilateral deviations;kailee decreased;gait speed decreased  (Pended)   -JG     Bilateral Gait Deviations  forward flexed posture;heel strike decreased  (Pended)   -JG     Left Sided Gait Deviations  knee buckling, left side;weight shift ability decreased  (Pended)   -JG     Comment (Gait/Stairs)  Pt ambulated 8' with B handheld assist and ModAx2, but had slight difficulty advancing her L LE. She demonstrated improved L LE advancement with VC and weight shift to the R. Distance limited by fatigue.  (Pended)   -JG       User Key  (r) = Recorded By, (t) = Taken By, (c) = Cosigned By    Initials Name Provider Type    Clarisse Hernandez, PT Student PT Student        Obj/Interventions     Row Name 08/03/20 1533          Therapeutic Exercise    Lower Extremity (Therapeutic Exercise)  gluteal sets;LAQ (long arc quad),  bilateral;quad sets, bilateral  (Pended)   -JG     Lower Extremity Range of Motion (Therapeutic Exercise)  hip internal/external rotation, bilateral;ankle dorsiflexion/plantar flexion, bilateral;hip abduction/adduction, bilateral  (Pended)   -JG     Exercise Type (Therapeutic Exercise)  AROM (active range of motion);AAROM (active assistive range of motion)  (Pended)   -JG     Position (Therapeutic Exercise)  seated;supine  (Pended)   -JG     Sets/Reps (Therapeutic Exercise)  1 set, 10 reps  (Pended)   -JG     Expected Outcome (Therapeutic Exercise)  facilitate normal movement patterns;improve functional stability;improve neuromuscular control  (Pended)   -JG     Comment (Therapeutic Exercise)  Verbal and tactile cues for technique. AAROM for L LE ankle pumps, LAQ and hip abduction  (Pended)   -[employer]     Row Name 08/03/20 1533          Static Sitting Balance    Level of [place] (Unsupported Sitting, Static Balance)  minimal assist, 75% patient effort  (Pended)   -JG     Sitting Position (Unsupported Sitting, Static Balance)  sitting on edge of bed  (Pended)   -JG     Time Able to Maintain Position (Unsupported Sitting, Static Balance)  45 to 60 seconds  (Pended)   -JG     Comment (Unsupported Sitting, Static Balance)  MinAx1 for postural stability. VC for upright posture  (Pended)   -[employer]     Row Name 08/03/20 1533          Dynamic Sitting Balance    Level of [place], Reaches Outside Midline (Sitting, Dynamic Balance)  moderate assist, 50 to 74% patient effort  (Pended)   -JG     Sitting Position, Reaches Outside Midline (Sitting, Dynamic Balance)  sitting on edge of bed  (Pended)   -JG     Comment, Reaches Outside Midline (Sitting, Dynamic Balance)  Pt primarily required MinAx1 for stability, but experienced a loss of balance when leaning to the L requiring ModAx1 for upright posture.  (Pended)   -[employer]     Row Name 08/03/20 1533          Static Standing Balance    Level of [place] (Supported Standing, Static  Balance)  minimal assist, 75% patient effort  (Pended)   -JG     Time Able to Maintain Position (Supported Standing, Static Balance)  15 to 30 seconds  (Pended)   -JG     Assistive Device Utilized (Supported Standing, Static Balance)  other (see comments)  (Pended)  B handheld assist  -JG       User Key  (r) = Recorded By, (t) = Taken By, (c) = Cosigned By    Initials Name Provider Type    YANNIG Clarisse Anderson, PT Student PT Student        Goals/Plan    No documentation.       Clinical Impression     Row Name 08/03/20 1537          Pain Assessment    Additional Documentation  Pain Scale: Numbers Pre/Post-Treatment (Group)  (Pended)   -JG     Row Name 08/03/20 1537          Pain Scale: Numbers Pre/Post-Treatment    Pain Scale: Numbers, Pretreatment  0/10 - no pain  (Pended)   -JG     Pain Scale: Numbers, Post-Treatment  0/10 - no pain  (Pended)   -JG     Pre/Post Treatment Pain Comment  Pt tolerated activities appropriatley  (Pended)   -     Pain Intervention(s)  Ambulation/increased activity;Repositioned  (Pended)   -JG     Row Name 08/03/20 1537          Plan of Care Review    Plan of Care Reviewed With  patient;son  (Pended)   -     Progress  improving  (Pended)   -     Outcome Summary  Pt demonstrated improved functional mobility, requiring MinAx1 for bed mobility and ambulated 8 ft with ModAx2 and B handheld assist. Pt continues to be limited by fatigue and decreased postural stability, but tolerated activities well today. Discharge recommendation continues to be IRF.  (Pended)   -JG     Row Name 08/03/20 1537          Positioning and Restraints    Pre-Treatment Position  in bed  (Pended)   -JG     Post Treatment Position  chair  (Pended)   -JG     In Chair  notified nsg;reclined;call light within reach;encouraged to call for assist;exit alarm on;compression device;heels elevated  (Pended)   -J       User Key  (r) = Recorded By, (t) = Taken By, (c) = Cosigned By    Initials Name Provider Type    J  Clarisse Anderson, PT Student PT Student        Outcome Measures     Row Name 08/03/20 1540          How much help from another person do you currently need...    Turning from your back to your side while in flat bed without using bedrails?  2  (Pended)   -JG     Moving from lying on back to sitting on the side of a flat bed without bedrails?  2  (Pended)   -JG     Moving to and from a bed to a chair (including a wheelchair)?  2  (Pended)   -JG     Standing up from a chair using your arms (e.g., wheelchair, bedside chair)?  3  (Pended)   -JG     Climbing 3-5 steps with a railing?  1  (Pended)   -JG     To walk in hospital room?  2  (Pended)   -JG     AM-PAC 6 Clicks Score (PT)  12  (Pended)   -SELAM     Row Name 08/03/20 0193          Modified [place] Scale    Modified [place] Scale  4 - Moderately severe disability.  Unable to walk without assistance, and unable to attend to own bodily needs without assistance.  (Pended)   -YANNI     Row Name 08/03/20 1541          Functional Assessment    Outcome Measure Options  AM-PAC 6 Clicks Basic Mobility (PT);Modified [place]  (Pended)   -JG       User Key  (r) = Recorded By, (t) = Taken By, (c) = Cosigned By    Initials Name Provider Type    Clarisse Hernandez, PT Student PT Student        Physical Therapy Education                 Title: PT OT SLP Therapies (In Progress)     Topic: Physical Therapy (Done)     Point: Mobility training (Done)     Description:   Instruct learner(s) on safety and technique for assisting patient out of bed, chair or wheelchair.  Instruct in the proper use of assistive devices, such as walker, crutches, cane or brace.              Patient Friendly Description:   It's important to get you on your feet again, but we need to do so in a way that is safe for you. Falling has serious consequences, and your personal safety is the most important thing of all.        When it's time to get out of bed, one of us or a family member will sit next to you on the bed  to give you support.     If your doctor or nurse tells you to use a walker, crutches, a cane, or a brace, be sure you use it every time you get out of bed, even if you think you don't need it.    Learning Progress Summary           Patient Acceptance, E, VU,NR by JG at 8/3/2020 1458    Comment:  Pt educated about transfer strategies and gait training.    Acceptance, E, NR by ES at 8/1/2020 1504   Family Acceptance, E, VU,NR by JG at 8/3/2020 1458    Comment:  Pt educated about transfer strategies and gait training.    Acceptance, E, NR by ES at 8/1/2020 1504                   Point: Home exercise program (Done)     Description:   Instruct learner(s) on appropriate technique for monitoring, assisting and/or progressing patient with therapeutic exercises and activities.              Learning Progress Summary           Patient Acceptance, E, VU,NR by JG at 8/3/2020 1458    Comment:  Pt educated about transfer strategies and gait training.    Acceptance, E, NR by ES at 8/1/2020 1504   Family Acceptance, E, VU,NR by JG at 8/3/2020 1458    Comment:  Pt educated about transfer strategies and gait training.    Acceptance, E, NR by ES at 8/1/2020 1504                   Point: Body mechanics (Done)     Description:   Instruct learner(s) on proper positioning and spine alignment for patient and/or caregiver during mobility tasks and/or exercises.              Learning Progress Summary           Patient Acceptance, E, VU,NR by JG at 8/3/2020 1458    Comment:  Pt educated about transfer strategies and gait training.    Acceptance, E, NR by ES at 8/1/2020 1504   Family Acceptance, E, VU,NR by JG at 8/3/2020 1458    Comment:  Pt educated about transfer strategies and gait training.    Acceptance, E, NR by ES at 8/1/2020 1504                   Point: Precautions (Done)     Description:   Instruct learner(s) on prescribed precautions during mobility and gait tasks              Learning Progress Summary           Patient Acceptance,  E, VU,NR by J at 8/3/2020 1458    Comment:  Pt educated about transfer strategies and gait training.    Acceptance, E, NR by ES at 8/1/2020 1504   Family Acceptance, E, VU,NR by J at 8/3/2020 1458    Comment:  Pt educated about transfer strategies and gait training.    Acceptance, E, NR by  at 8/1/2020 1504                               User Key     Initials Effective Dates Name Provider Type Discipline    ES 06/18/19 -  Misti Yañez, PT Physical Therapist PT     05/14/20 -  Clarisse Anderson PT Student PT Student PT              PT Recommendation and Plan     Plan of Care Reviewed With: (P) patient, son  Progress: (P) improving  Outcome Summary: (P) Pt demonstrated improved functional mobility, requiring MinAx1 for bed mobility and ambulated 8 ft with ModAx2 and B handheld assist. Pt continues to be limited by fatigue and decreased postural stability, but tolerated activities well today. Discharge recommendation continues to be IRF.     Time Calculation:   PT Charges     Row Name 08/03/20 1543             Time Calculation    Start Time  1458  (Pended)   -      PT Received On  08/03/20  (Pended)   -      PT Goal Re-Cert Due Date  08/11/20  (Pended)   -         Time Calculation- PT    Total Timed Code Minutes- PT  23 minute(s)  (Pended)   -         Timed Charges    04985 - PT Therapeutic Exercise Minutes  13  (Pended)   -      67015 - Gait Training Minutes   10  (Pended)   -        User Key  (r) = Recorded By, (t) = Taken By, (c) = Cosigned By    Initials Name Provider Type     Clarisse Anderson, PT Student PT Student        Therapy Charges for Today     Code Description Service Date Service Provider Modifiers Qty    73330783392 HC PT THER PROC EA 15 MIN 8/3/2020 Clarisse Anderson PT Student GP 1    [id number] HC GAIT TRAINING EA 15 MIN 8/3/2020 Clarisse Anderson, PT Student GP 1          PT G-Codes  Outcome Measure Options: (P) AM-PAC 6 Clicks Basic Mobility (PT), Modified [place]  AM-PAC 6  Clicks Score (PT): (P) 12  AM-PAC 6 Clicks Score (OT): 6  Modified [place] Scale: (P) 4 - Moderately severe disability.  Unable to walk without assistance, and unable to attend to own bodily needs without assistance.    Clarisse Anderson, PT Student  8/3/2020

## 2020-08-03 NOTE — PROGRESS NOTES
[facility] Medicine Services  PROGRESS NOTE    Patient Name: Marquez Myrick  : 1940  MRN: [id number]    Date of Admission: 2020  Primary Care Physician: Provider, No Known    Subjective   Subjective     CC:  Follow up right MCA stroke s/p thrombectomy    HPI:  Ms Myrick transferred out of the ICU overnight.  No overnight events reported.  The patient's daughter in law reports the patient is still quite somnolent but she does rouse easily.  She is able to squeeze a little with her left hand which she wasn't able to do before.  I roused the patient and she was able to tell me who she is and where she used to work.  The daughter in law is requesting another speech eval - reports that the patient is pocketing food.    Review of Systems  No F/C  Denies pain  No N/V/D  No chest pain  No SOA    Objective   Objective     Vital Signs:   Temp:  [97.7 °F (36.5 °C)-98.1 °F (36.7 °C)] 98.1 °F (36.7 °C)  Heart Rate:  [] 79  Resp:  [18-20] 18  BP: (158-173)/(66-70) 158/66  Total (NIH Stroke Scale): 12     Physical Exam:  Constitutional: No acute distress, asleep but rouses easily  HENT: NCAT, mucous membranes moist  Respiratory: Clear to auscultation bilaterally, respiratory effort normal   Cardiovascular: RRR, no murmurs, rubs, or gallops, palpable pedal pulses bilaterally  Gastrointestinal: Positive bowel sounds, soft, nontender, nondistended  Musculoskeletal: No bilateral ankle edema  Psychiatric: Appropriate affect, cooperative  Neurologic: Oriented x 3, 3+/5 left upper and lower extremity,, + visual field change,dysarthria  Skin: No rashes      Results Reviewed:  Results from last 7 days   Lab Units 20  0742 20  0530 20  0522   WBC 10*3/mm3 17.40*  --  11.93*   HEMOGLOBIN g/dL 12.8  --  13.8   HEMOGLOBIN, POC g/dL  --  15.6  --    HEMATOCRIT % 41.3  --  42.0   HEMATOCRIT POC %  --  46  --    PLATELETS 10*3/mm3 197  --  234   INR   --   --  0.9     Results from  last 7 days   Lab Units 08/03/20  0604 08/02/20  0610 08/01/20  0742  07/31/20  0522   SODIUM mmol/L 143 142 140  --   --    POTASSIUM mmol/L 3.9 4.2 4.5  --   --    CHLORIDE mmol/L 109* 106 101  --   --    CO2 mmol/L 22.0 20.0* 20.0*  --   --    BUN mg/dL 32* 32* 26*  --   --    CREATININE mg/dL 1.45* 1.44* 1.81*   < >  --    GLUCOSE mg/dL 187* 233* 257*  --   --    CALCIUM mg/dL 9.2 9.4 9.7  --   --    ALT (SGPT) U/L  --   --   --   --  13   AST (SGOT) U/L  --   --   --   --  19   TROPONIN T ng/mL  --   --   --   --  <0.010    < > = values in this interval not displayed.     Estimated Creatinine Clearance: 33.8 mL/min (A) (by C-G formula based on SCr of 1.45 mg/dL (H)).    Microbiology Results Abnormal     Procedure Component Value - Date/Time    COVID-19,CEPHEID,ARLEEN IN-HOUSE(OR EMERGENT/ADD-ON),NP SWAB IN TRANSPORT MEDIA 3-4 HR TAT - Swab, Nasopharynx [[id number]]  (Normal) Collected:  07/31/20 0543    Lab Status:  Final result Specimen:  Swab from Nasopharynx Updated:  07/31/20 0654     COVID19 Not Detected    Narrative:       Fact sheet for providers: https://www.fda.gov/media/835964/download     Fact sheet for patients: https://www.fda.gov/media/031084/download          Imaging Results (Last 24 Hours)     Procedure Component Value Units Date/Time    CT Head Without Contrast [[id number]] Collected:  08/03/20 1120     Updated:  08/03/20 1245    Narrative:       EXAMINATION: CT HEAD WO CONTRAST-08/03/2020:      INDICATION: Stroke; I63.9-Cerebral infarction, unspecified;  R47.1-Dysarthria and anarthria; Z74.09-Other reduced mobility;  Z78.9-Other specified health status; R13.12-Dysphagia, oropharyngeal  phase; R41.841-Cognitive communication deficit, followup stroke.     TECHNIQUE: Multiple axial CT imaging was obtained of the head from the  skull base to the skull vertex without the administration of intravenous  contrast.     The radiation dose reduction device was turned on for each scan per the  ALARA (As Low as  Reasonably Achievable) protocol.     COMPARISON: NONE.     FINDINGS: Continued evolution identified and low density within the  right temporoparietal lobe. No hemorrhagic conversion. No significant  surrounding edema or mass effect. No abnormal extra-axial fluid  collection identified. The bony structures are unremarkable. The  visualized paranasal sinuses are clear.       Impression:       Evolving right temporoparietal area of infarction. No  significant change in the surrounding edema or mass effect.     D:  08/03/2020  E:  08/03/2020           This report was finalized on 8/3/2020 12:42 PM by Dr. Joan Quinones MD.       CT Head Without Contrast [[id number]] Collected:  08/01/20 0824     Updated:  08/03/20 1233    Narrative:       EXAMINATION: CT HEAD WO CONTRAST - 08/01/2020     INDICATION:  I63.9-Cerebral infarction, unspecified; R47.1-Dysarthria  and anarthria. Stroke symptoms, follow-up CVA.     TECHNIQUE: Multiple axial CT imaging is obtained of the head from skull  base to skull vertex without the administration of intravenous contrast.     The radiation dose reduction device was turned on for each scan per the  ALARA (As Low as Reasonably Achievable) protocol.     COMPARISON: 07/31/2020     FINDINGS: There is an area of low density identified within the right  temporoparietal region suggesting patient's evolving area of infarction.  There is minimal surrounding edema and mass effect. No hemorrhage. No  midline shift. No effacement of the ventricular system. Bony structures  reveal no evidence of osseous abnormality. The visualized paranasal  sinuses are clear. The mastoid air cells are patent.       Impression:       Area of evolving infarction in the right temporoparietal  region. No significant effacement of the ventricular system with minimal  edema and mass effect. No midline shift. No hemorrhagic or conversion.     DICTATED:   08/01/2020  EDITED/ls :   08/01/2020         This report was finalized  on 8/3/2020 12:30 PM by Dr. Joan Quinones MD.             Results for orders placed during the hospital encounter of 07/31/20   Adult Transthoracic Echo Complete W/ Cont if Necessary Per Protocol (With Agitated Saline)    Narrative · Estimated EF = 55%.  · Left ventricular systolic function is normal.  · Trace to mild mitral regurgitation  · Trace tricuspid regurgitation          I have reviewed the medications:  Scheduled Meds:  aspirin 325 mg Oral Daily   Or      aspirin 300 mg Rectal Daily   atorvastatin 80 mg Oral Nightly   clopidogrel 75 mg Oral Daily   insulin detemir 10 Units Subcutaneous Nightly   insulin lispro 0-9 Units Subcutaneous TID AC   levothyroxine 88 mcg Oral QAM   sodium chloride 10 mL Intravenous Q12H     Continuous Infusions:   PRN Meds:.heparin  •  ondansetron  •  sodium chloride  •  sodium chloride  •  sodium chloride  •  sodium chloride    Assessment/Plan   Assessment & Plan     Active Hospital Problems    Diagnosis  POA   • **AIS  [I63.9]  Yes   • H/O grade II follicular lymphoma  [C82.90]  Yes   • Hypertension [I10]  Yes   • Dyslipidemia [E78.5]  Yes   • Hypothyroidism [E03.9]  Yes   • T2DM  [E11.9]  Yes   • Smoker [F17.200]  Yes      Resolved Hospital Problems   No resolved problems to display.        Brief Hospital Course to date:  Marquez Myrick is a 80 y.o. female with PMH significant for follicular lymphoma, HTN, HLD, hypothyroidism, T2DM, ongoing smoking.  The patient presented to [facility] on 7/31/2020 with right MCA stroke and underwent thrombectomy by Dr. Pugh.      --Followed by neuro team  --Continue ASA, plavix  and statin  --Re-eval by speech - will follow reccs  --ECHO - for PFO  --CT was repeated today due to persistent somnolence -- and showed evolving infarct without hemorrhage or change in surrounding edema or mass effect  --Allow permissive HTN with -180 as the patient is more alert with higher pressures  --Continue basal/bolus insulin  --Encourage smoking  cessation  --PT/OT/CM    DVT Prophylaxis:  [facility]      Disposition: I expect the patient to be discharged TBD to acute rehab    CODE STATUS:   Code Status and Medical Interventions:   Ordered at: 07/31/20 0803     Code Status:    CPR     Medical Interventions (Level of Support Prior to Arrest):    Full       Electronically signed by Veronica Chen MD, 08/03/20, 15:13.

## 2020-08-03 NOTE — PLAN OF CARE
Problem: Patient Care Overview  Goal: Plan of Care Review  Flowsheets (Taken 8/3/2020 5364)  Plan of Care Reviewed With: patient  Note:   Patient alert and oriented X 4.  Denies pain. VSS.  Repositioned every two hours.  NIHSS of 12.

## 2020-08-03 NOTE — PROGRESS NOTES
Continued Stay Note  [facility]     Patient Name: Marquez Myrick  MRN: [id number]  Today's Date: 8/3/2020    Admit Date: 7/31/2020    Discharge Plan     Row Name 08/03/20 1237       Plan    Plan  Needs rehab    Patient/Family in Agreement with Plan  yes    Plan Comments  Met with patient in the room.  Getting ready for transport to radiology for CT scan.  Therapy recommending rehab at discharge.  NPO, awaiting FEES study.  Following for D/C needs.       Final Discharge Disposition Code  03 - skilled nursing facility (SNF)        Discharge Codes    No documentation.       Expected Discharge Date and Time     Expected Discharge Date Expected Discharge Time    Aug 6, 2020             Trish Rutledge RN

## 2020-08-04 LAB
ANION GAP SERPL CALCULATED.3IONS-SCNC: 12 MMOL/L (ref 5–15)
BUN SERPL-MCNC: 33 MG/DL (ref 8–23)
BUN/CREAT SERPL: 23.6 (ref 7–25)
CALCIUM SPEC-SCNC: 9 MG/DL (ref 8.6–10.5)
CHLORIDE SERPL-SCNC: 108 MMOL/L (ref 98–107)
CO2 SERPL-SCNC: 23 MMOL/L (ref 22–29)
CREAT SERPL-MCNC: 1.4 MG/DL (ref 0.57–1)
DEPRECATED RDW RBC AUTO: 50.2 FL (ref 37–54)
ERYTHROCYTE [DISTWIDTH] IN BLOOD BY AUTOMATED COUNT: 14.8 % (ref 12.3–15.4)
GFR SERPL CREATININE-BSD FRML MDRD: 36 ML/MIN/1.73
GLUCOSE BLDC GLUCOMTR-MCNC: 118 MG/DL (ref 70–130)
GLUCOSE BLDC GLUCOMTR-MCNC: 247 MG/DL (ref 70–130)
GLUCOSE BLDC GLUCOMTR-MCNC: 255 MG/DL (ref 70–130)
GLUCOSE BLDC GLUCOMTR-MCNC: 281 MG/DL (ref 70–130)
GLUCOSE SERPL-MCNC: 121 MG/DL (ref 65–99)
HCT VFR BLD AUTO: 36.4 % (ref 34–46.6)
HGB BLD-MCNC: 11.3 G/DL (ref 12–15.9)
MCH RBC QN AUTO: 28.8 PG (ref 26.6–33)
MCHC RBC AUTO-ENTMCNC: 31 G/DL (ref 31.5–35.7)
MCV RBC AUTO: 92.9 FL (ref 79–97)
PLATELET # BLD AUTO: 163 10*3/MM3 (ref 140–450)
PMV BLD AUTO: 11 FL (ref 6–12)
POTASSIUM SERPL-SCNC: 3.9 MMOL/L (ref 3.5–5.2)
RBC # BLD AUTO: 3.92 10*6/MM3 (ref 3.77–5.28)
SODIUM SERPL-SCNC: 143 MMOL/L (ref 136–145)
WBC # BLD AUTO: 14.1 10*3/MM3 (ref 3.4–10.8)

## 2020-08-04 PROCEDURE — 99231 SBSQ HOSP IP/OBS SF/LOW 25: CPT | Performed by: NURSE PRACTITIONER

## 2020-08-04 PROCEDURE — 97116 GAIT TRAINING THERAPY: CPT

## 2020-08-04 PROCEDURE — 85027 COMPLETE CBC AUTOMATED: CPT | Performed by: FAMILY MEDICINE

## 2020-08-04 PROCEDURE — 63710000001 INSULIN DETEMIR PER 5 UNITS: Performed by: INTERNAL MEDICINE

## 2020-08-04 PROCEDURE — 82962 GLUCOSE BLOOD TEST: CPT

## 2020-08-04 PROCEDURE — 92507 TX SP LANG VOICE COMM INDIV: CPT

## 2020-08-04 PROCEDURE — 97535 SELF CARE MNGMENT TRAINING: CPT

## 2020-08-04 PROCEDURE — 97110 THERAPEUTIC EXERCISES: CPT

## 2020-08-04 PROCEDURE — 63710000001 INSULIN LISPRO (HUMAN) PER 5 UNITS: Performed by: INTERNAL MEDICINE

## 2020-08-04 PROCEDURE — 99232 SBSQ HOSP IP/OBS MODERATE 35: CPT | Performed by: FAMILY MEDICINE

## 2020-08-04 PROCEDURE — 80048 BASIC METABOLIC PNL TOTAL CA: CPT | Performed by: FAMILY MEDICINE

## 2020-08-04 PROCEDURE — 92526 ORAL FUNCTION THERAPY: CPT

## 2020-08-04 RX ORDER — LOSARTAN POTASSIUM 50 MG/1
50 TABLET ORAL
Status: DISCONTINUED | OUTPATIENT
Start: 2020-08-04 | End: 2020-08-05

## 2020-08-04 RX ADMIN — INSULIN LISPRO 4 UNITS: 100 INJECTION, SOLUTION INTRAVENOUS; SUBCUTANEOUS at 12:06

## 2020-08-04 RX ADMIN — SODIUM CHLORIDE, PRESERVATIVE FREE 10 ML: 5 INJECTION INTRAVENOUS at 21:27

## 2020-08-04 RX ADMIN — ASPIRIN 325 MG ORAL TABLET 325 MG: 325 PILL ORAL at 08:48

## 2020-08-04 RX ADMIN — CLOPIDOGREL BISULFATE 75 MG: 75 TABLET ORAL at 08:48

## 2020-08-04 RX ADMIN — LOSARTAN POTASSIUM 50 MG: 50 TABLET, FILM COATED ORAL at 10:22

## 2020-08-04 RX ADMIN — LEVOTHYROXINE SODIUM 88 MCG: 88 TABLET ORAL at 06:48

## 2020-08-04 RX ADMIN — INSULIN LISPRO 6 UNITS: 100 INJECTION, SOLUTION INTRAVENOUS; SUBCUTANEOUS at 17:49

## 2020-08-04 RX ADMIN — INSULIN DETEMIR 10 UNITS: 100 INJECTION, SOLUTION SUBCUTANEOUS at 21:27

## 2020-08-04 RX ADMIN — SODIUM CHLORIDE, PRESERVATIVE FREE 10 ML: 5 INJECTION INTRAVENOUS at 08:47

## 2020-08-04 RX ADMIN — ATORVASTATIN CALCIUM 80 MG: 40 TABLET, FILM COATED ORAL at 21:27

## 2020-08-04 NOTE — THERAPY TREATMENT NOTE
Patient Name: Marquez Myrick  : 1940    MRN: [id number]                              Today's Date: 2020       Admit Date: 2020    Visit Dx:     ICD-10-CM ICD-9-CM   1. Acute ischemic stroke (CMS/HCC) I63.9 434.91   2. Dysarthria R47.1 784.51   3. Impaired mobility and ADLs Z74.09 V49.89    Z78.9    4. Oropharyngeal dysphagia R13.12 787.22   5. Cognitive communication deficit R41.841 799.52     Patient Active Problem List   Diagnosis   • H/O grade II follicular lymphoma    • Hypertension   • Dyslipidemia   • Hypothyroidism   • T2DM    • AIS    • Smoker     Past Medical History:   Diagnosis Date   • Dyslipidemia 2020   • Hypertension 2020   • Hypothyroidism 2020     Past Surgical History:   Procedure Laterality Date   • APPENDECTOMY     • CHOLECYSTECTOMY     • COLON SURGERY Right     R colon resection    • DEEP NECK LYMPH NODE BIOPSY / EXCISION     • HYSTERECTOMY     • PORTACATH PLACEMENT Left     L subclavian      General Information     Row Name 20 1203          PT Evaluation Time/Intention    Document Type  therapy note (daily note)  -KM     Mode of Treatment  physical therapy  -     Row Name 20 1203          General Information    Patient Profile Reviewed?  yes  -KM     Existing Precautions/Restrictions  fall;oxygen therapy device and L/min  -KM     Row Name 20 1203          Cognitive Assessment/Intervention- PT/OT    Orientation Status (Cognition)  oriented x 3  -KM     Row Name 20 1203          Safety Issues, Functional Mobility    Safety Issues Affecting Function (Mobility)  impulsivity;safety precaution awareness;safety precautions follow-through/compliance  -KM     Impairments Affecting Function (Mobility)  balance;motor control;postural/trunk control;sensation/sensory awareness;strength  -KM       User Key  (r) = Recorded By, (t) = Taken By, (c) = Cosigned By    Initials Name Provider Type    Yas Christopher, PT Physical  Therapist        Mobility     Row Name 08/04/20 1204          Bed Mobility Assessment/Treatment    Bed Mobility Assessment/Treatment  supine-sit;scooting/bridging  -KM     Scooting/Bridging [place] (Bed Mobility)  minimum assist (75% patient effort)  -KM     Supine-Sit [place] (Bed Mobility)  minimum assist (75% patient effort);verbal cues cues  for sequencing  -KM     Assistive Device (Bed Mobility)  head of bed elevated;draw sheet  -KM     Row Name 08/04/20 1204          Sit-Stand Transfer    Sit-Stand [place] (Transfers)  verbal cues;minimum assist (75% patient effort) cues for hand placement and assist to secure L hand ., verbal and tactile cues for erect posture  -KM     Assistive Device (Sit-Stand Transfers)  walker, front-wheeled  -KM     Row Name 08/04/20 1204          Gait/Stairs Assessment/Training    Gait/Stairs Assessment/Training  gait/ambulation independence  -KM     [place] Level (Gait)  verbal cues;minimum assist (75% patient effort);2 person assist cues for deviation corrections, assist to facilitate lateral wt shift  -KM     Assistive Device (Gait)  walker, platform  -KM     Distance in Feet (Gait)  60  -KM     Pattern (Gait)  step-through  -KM     Left Sided Gait Deviations  weight shift ability decreased;knee hyperextension;heel strike decreased  -KM     Comment (Gait/Stairs)  pt demonstrates fatigue toward completion of walk with increase deviations evident with gait  -KM       User Key  (r) = Recorded By, (t) = Taken By, (c) = Cosigned By    Initials Name Provider Type     Yas Moran, PT Physical Therapist        Obj/Interventions     Row Name 08/04/20 1210          Therapeutic Exercise    Lower Extremity (Therapeutic Exercise)  gluteal sets;LAQ (long arc quad), bilateral;heel slides, left  -KM     Lower Extremity Range of Motion (Therapeutic Exercise)  hip abduction/adduction, bilateral;ankle dorsiflexion/plantar flexion, bilateral  -KM     Exercise Type  (Therapeutic Exercise)  AROM (active range of motion);AAROM (active assistive range of motion)  -KM     Position (Therapeutic Exercise)  seated  -KM     Sets/Reps (Therapeutic Exercise)  10x  -KM     Row Name 08/04/20 1210          Static Sitting Balance    Level of [place] (Unsupported Sitting, Static Balance)  contact guard assist  -KM     Sitting Position (Unsupported Sitting, Static Balance)  sitting on edge of bed  -KM     Row Name 08/04/20 1210          Static Standing Balance    Level of [place] (Supported Standing, Static Balance)  minimal assist, 75% patient effort  -KM     Assistive Device Utilized (Supported Standing, Static Balance)  walker, rolling  -KM     Row Name 08/04/20 1210          Dynamic Standing Balance    Level of [place], Reaches Outside Midline (Standing, Dynamic Balance)  minimal assist, 75% patient effort  -KM     Assistive Device Utilized (Supported Standing, Dynamic Balance)  walker, rolling  -KM       User Key  (r) = Recorded By, (t) = Taken By, (c) = Cosigned By    Initials Name Provider Type    Yas Christopher, PT Physical Therapist        Goals/Plan    No documentation.       Clinical Impression     Row Name 08/04/20 1211          Pain Scale: Numbers Pre/Post-Treatment    Pain Scale: Numbers, Pretreatment  0/10 - no pain  -     Pain Scale: Numbers, Post-Treatment  0/10 - no pain  -KM     Row Name 08/04/20 1211          Plan of Care Review    Plan of Care Reviewed With  patient;daughter  -KM     Progress  improving  -KM     Outcome Summary  Pt transitioned to eob with min assist, transfers and ambul 60 ft with min assist x2 and r wx. Pt requires cues and facilitation for wt shift and to reinforce normal gait pattern. She is an excellent rehab candidate for inpt rehab  -     Row Name 08/04/20 1211          Positioning and Restraints    Pre-Treatment Position  in bed  -KM     Post Treatment Position  chair  -KM     In Chair  reclined;call light within  reach;encouraged to call for assist;exit alarm on;with family/caregiver  -KM       User Key  (r) = Recorded By, (t) = Taken By, (c) = Cosigned By    Initials Name Provider Type    Yas Christopher, PT Physical Therapist        Outcome Measures     Row Name 08/04/20 1213          How much help from another person do you currently need...    Turning from your back to your side while in flat bed without using bedrails?  3  -KM     Moving from lying on back to sitting on the side of a flat bed without bedrails?  3  -KM     Moving to and from a bed to a chair (including a wheelchair)?  2  -KM     Standing up from a chair using your arms (e.g., wheelchair, bedside chair)?  3  -KM     Climbing 3-5 steps with a railing?  1  -KM     To walk in hospital room?  2  -KM     AM-PAC 6 Clicks Score (PT)  14  -KM     Row Name 08/04/20 1213          Functional Assessment    Outcome Measure Options  AM-PAC 6 Clicks Basic Mobility (PT)  -KM       User Key  (r) = Recorded By, (t) = Taken By, (c) = Cosigned By    Initials Name Provider Type    Yas Christopher, PT Physical Therapist        Physical Therapy Education                 Title: PT OT SLP Therapies (In Progress)     Topic: Physical Therapy (Done)     Point: Mobility training (Done)     Description:   Instruct learner(s) on safety and technique for assisting patient out of bed, chair or wheelchair.  Instruct in the proper use of assistive devices, such as walker, crutches, cane or brace.              Patient Friendly Description:   It's important to get you on your feet again, but we need to do so in a way that is safe for you. Falling has serious consequences, and your personal safety is the most important thing of all.        When it's time to get out of bed, one of us or a family member will sit next to you on the bed to give you support.     If your doctor or nurse tells you to use a walker, crutches, a cane, or a brace, be sure you use it every time you  get out of bed, even if you think you don't need it.    Learning Progress Summary           Patient Eager, E, VU by KM at 8/4/2020 1214    Acceptance, E, VU,NR by JG at 8/3/2020 1458    Comment:  Pt educated about transfer strategies and gait training.    Acceptance, E, NR by ES at 8/1/2020 1504   Family Acceptance, E, VU,NR by JG at 8/3/2020 1458    Comment:  Pt educated about transfer strategies and gait training.    Acceptance, E, NR by ES at 8/1/2020 1504   Caregiver Eager, E, VU by KM at 8/4/2020 1214                   Point: Home exercise program (Done)     Description:   Instruct learner(s) on appropriate technique for monitoring, assisting and/or progressing patient with therapeutic exercises and activities.              Learning Progress Summary           Patient Eager, E, VU by KM at 8/4/2020 1214    Acceptance, E, VU,NR by JG at 8/3/2020 1458    Comment:  Pt educated about transfer strategies and gait training.    Acceptance, E, NR by ES at 8/1/2020 1504   Family Acceptance, E, VU,NR by JG at 8/3/2020 1458    Comment:  Pt educated about transfer strategies and gait training.    Acceptance, E, NR by ES at 8/1/2020 1504   Caregiver Eager, E, VU by KM at 8/4/2020 1214                   Point: Body mechanics (Done)     Description:   Instruct learner(s) on proper positioning and spine alignment for patient and/or caregiver during mobility tasks and/or exercises.              Learning Progress Summary           Patient Eager, E, VU by KM at 8/4/2020 1214    Acceptance, E, VU,NR by JG at 8/3/2020 1458    Comment:  Pt educated about transfer strategies and gait training.    Acceptance, E, NR by ES at 8/1/2020 1504   Family Acceptance, E, VU,NR by JG at 8/3/2020 1458    Comment:  Pt educated about transfer strategies and gait training.    Acceptance, E, NR by ES at 8/1/2020 1504   Caregiver Eager, E, VU by KM at 8/4/2020 1214                   Point: Precautions (Done)     Description:   Instruct learner(s) on  prescribed precautions during mobility and gait tasks              Learning Progress Summary           Patient Eager, E, VU by KM at 8/4/2020 1214    Acceptance, E, VU,NR by J at 8/3/2020 1458    Comment:  Pt educated about transfer strategies and gait training.    Acceptance, E, NR by ES at 8/1/2020 1504   Family Acceptance, E, VU,NR by JG at 8/3/2020 1458    Comment:  Pt educated about transfer strategies and gait training.    Acceptance, E, NR by ES at 8/1/2020 1504   Caregiver Eager, E, VU by KM at 8/4/2020 1214                               User Key     Initials Effective Dates Name Provider Type Discipline     06/19/15 -  Yas Moran, PT Physical Therapist PT    ES 06/18/19 -  Misti Yañez, PT Physical Therapist PT    SELAM 05/14/20 -  Clarisse Anderson, PT Student PT Student PT              PT Recommendation and Plan     Plan of Care Reviewed With: patient  Progress: improving  Outcome Summary: Pt transitioned to eob with min assist, transfers and ambul 60 ft with min assist x2 and r wx. Pt requires cues and facilitation for wt shift and to reinforce normal gait pattern. She is an excellent rehab candidate for inpt rehab     Time Calculation:   PT Charges     Row Name 08/04/20 1215             Time Calculation    Start Time  1124  -KM      PT Received On  08/04/20  -KM      PT Goal Re-Cert Due Date  08/11/20  -KM         Time Calculation- PT    Total Timed Code Minutes- PT  23 minute(s)  -KM         Timed Charges    19362 - PT Therapeutic Exercise Minutes  8  -KM      43915 - Gait Training Minutes   15  -KM        User Key  (r) = Recorded By, (t) = Taken By, (c) = Cosigned By    Initials Name Provider Type     Yas Moran, PT Physical Therapist        Therapy Charges for Today     Code Description Service Date Service Provider Modifiers Qty    38648281009 HC PT THER PROC EA 15 MIN 8/4/2020 Yas Moran, PT GP 1    89149510406 HC GAIT TRAINING EA 15 MIN 8/4/2020 Dean  Yas LOPEZ, PT GP 1    [id number]  PT THER SUPP EA 15 MIN 8/4/2020 Yas Moran, PT GP 2          PT G-Codes  Outcome Measure Options: AM-PAC 6 Clicks Basic Mobility (PT)  AM-PAC 6 Clicks Score (PT): 14  AM-PAC 6 Clicks Score (OT): 6  Modified [place] Scale: 4 - Moderately severe disability.  Unable to walk without assistance, and unable to attend to own bodily needs without assistance.    Yas Moran, PT  8/4/2020

## 2020-08-04 NOTE — PROGRESS NOTES
[facility] Medicine Services  PROGRESS NOTE    Patient Name: Marquez Myrick  : 1940  MRN: [id number]    Date of Admission: 2020  Primary Care Physician: Provider, No Known    Subjective   Subjective     CC:  Follow up right MCA stroke s/p thrombectomy - no complaints    HPI:  No overnight events.  BP's high this morning.  Patient more alert.  Eager for rehab.    Review of Systems  No F/C  Denies pain  No N/V/D  No chest pain  No SOA    Objective   Objective     Vital Signs:   Temp:  [98.5 °F (36.9 °C)-99.4 °F (37.4 °C)] 98.5 °F (36.9 °C)  Heart Rate:  [70-89] 75  Resp:  [18] 18  BP: (136-194)/(63-86) 136/63  Total (NIH Stroke Scale): 8     Physical Exam:  Constitutional: No acute distress, sitting up, alert, cooperative  HENT: NCAT, mucous membranes moist  Respiratory: Clear to auscultation bilaterally, respiratory effort normal   Cardiovascular: RRR, no murmurs, rubs, or gallops, palpable pedal pulses bilaterally  Gastrointestinal: Positive bowel sounds, soft, nontender, nondistended  Musculoskeletal: No bilateral ankle edema  Psychiatric: Appropriate affect, cooperative  Neurologic: Oriented x 3, 4+/5 left upper and lower extremity,, + visual field change,dysarthria  Skin: No rashes      Results Reviewed:  Results from last 7 days   Lab Units 20  0632 20  0742 20  0530 20  0522   WBC 10*3/mm3 14.10* 17.40*  --  11.93*   HEMOGLOBIN g/dL 11.3* 12.8  --  13.8   HEMOGLOBIN, POC g/dL  --   --  15.6  --    HEMATOCRIT % 36.4 41.3  --  42.0   HEMATOCRIT POC %  --   --  46  --    PLATELETS 10*3/mm3 163 197  --  234   INR   --   --   --  0.9     Results from last 7 days   Lab Units 20  0632 20  0604 20  0610  20  0522   SODIUM mmol/L 143 143 142   < >  --    POTASSIUM mmol/L 3.9 3.9 4.2   < >  --    CHLORIDE mmol/L 108* 109* 106   < >  --    CO2 mmol/L 23.0 22.0 20.0*   < >  --    BUN mg/dL 33* 32* 32*   < >  --    CREATININE mg/dL  1.40* 1.45* 1.44*   < >  --    GLUCOSE mg/dL 121* 187* 233*   < >  --    CALCIUM mg/dL 9.0 9.2 9.4   < >  --    ALT (SGPT) U/L  --   --   --   --  13   AST (SGOT) U/L  --   --   --   --  19   TROPONIN T ng/mL  --   --   --   --  <0.010    < > = values in this interval not displayed.     Estimated Creatinine Clearance: 35 mL/min (A) (by C-G formula based on SCr of 1.4 mg/dL (H)).    Microbiology Results Abnormal     Procedure Component Value - Date/Time    COVID-19,CEPHEID,ARLEEN IN-HOUSE(OR EMERGENT/ADD-ON),NP SWAB IN TRANSPORT MEDIA 3-4 HR TAT - Swab, Nasopharynx [[id number]]  (Normal) Collected:  07/31/20 0543    Lab Status:  Final result Specimen:  Swab from Nasopharynx Updated:  07/31/20 0654     COVID19 Not Detected    Narrative:       Fact sheet for providers: https://www.fda.gov/media/127676/download     Fact sheet for patients: https://www.fda.gov/media/369665/download          Imaging Results (Last 24 Hours)     Procedure Component Value Units Date/Time    CT Head Without Contrast [[id number]] Collected:  08/03/20 1120     Updated:  08/03/20 1245    Narrative:       EXAMINATION: CT HEAD WO CONTRAST-08/03/2020:      INDICATION: Stroke; I63.9-Cerebral infarction, unspecified;  R47.1-Dysarthria and anarthria; Z74.09-Other reduced mobility;  Z78.9-Other specified health status; R13.12-Dysphagia, oropharyngeal  phase; R41.841-Cognitive communication deficit, followup stroke.     TECHNIQUE: Multiple axial CT imaging was obtained of the head from the  skull base to the skull vertex without the administration of intravenous  contrast.     The radiation dose reduction device was turned on for each scan per the  ALARA (As Low as Reasonably Achievable) protocol.     COMPARISON: NONE.     FINDINGS: Continued evolution identified and low density within the  right temporoparietal lobe. No hemorrhagic conversion. No significant  surrounding edema or mass effect. No abnormal extra-axial fluid  collection identified. The bony  structures are unremarkable. The  visualized paranasal sinuses are clear.       Impression:       Evolving right temporoparietal area of infarction. No  significant change in the surrounding edema or mass effect.     D:  08/03/2020  E:  08/03/2020           This report was finalized on 8/3/2020 12:42 PM by Dr. Joan Quinones MD.       CT Head Without Contrast [[id number]] Collected:  08/01/20 0824     Updated:  08/03/20 1233    Narrative:       EXAMINATION: CT HEAD WO CONTRAST - 08/01/2020     INDICATION:  I63.9-Cerebral infarction, unspecified; R47.1-Dysarthria  and anarthria. Stroke symptoms, follow-up CVA.     TECHNIQUE: Multiple axial CT imaging is obtained of the head from skull  base to skull vertex without the administration of intravenous contrast.     The radiation dose reduction device was turned on for each scan per the  ALARA (As Low as Reasonably Achievable) protocol.     COMPARISON: 07/31/2020     FINDINGS: There is an area of low density identified within the right  temporoparietal region suggesting patient's evolving area of infarction.  There is minimal surrounding edema and mass effect. No hemorrhage. No  midline shift. No effacement of the ventricular system. Bony structures  reveal no evidence of osseous abnormality. The visualized paranasal  sinuses are clear. The mastoid air cells are patent.       Impression:       Area of evolving infarction in the right temporoparietal  region. No significant effacement of the ventricular system with minimal  edema and mass effect. No midline shift. No hemorrhagic or conversion.     DICTATED:   08/01/2020  EDITED/ls :   08/01/2020         This report was finalized on 8/3/2020 12:30 PM by Dr. Joan Quinones MD.             Results for orders placed during the hospital encounter of 07/31/20   Adult Transthoracic Echo Complete W/ Cont if Necessary Per Protocol (With Agitated Saline)    Narrative · Estimated EF = 55%.  · Left ventricular systolic  function is normal.  · Trace to mild mitral regurgitation  · Trace tricuspid regurgitation          I have reviewed the medications:  Scheduled Meds:    aspirin 325 mg Oral Daily   Or      aspirin 300 mg Rectal Daily   atorvastatin 80 mg Oral Nightly   clopidogrel 75 mg Oral Daily   insulin detemir 10 Units Subcutaneous Nightly   insulin lispro 0-9 Units Subcutaneous TID AC   levothyroxine 88 mcg Oral QAM   losartan 50 mg Oral Q24H   sodium chloride 10 mL Intravenous Q12H     Continuous Infusions:   PRN Meds:.heparin  •  ondansetron  •  sodium chloride  •  sodium chloride  •  sodium chloride  •  sodium chloride    Assessment/Plan   Assessment & Plan     Active Hospital Problems    Diagnosis  POA   • **AIS  [I63.9]  Yes   • H/O grade II follicular lymphoma  [C82.90]  Yes   • Hypertension [I10]  Yes   • Dyslipidemia [E78.5]  Yes   • Hypothyroidism [E03.9]  Yes   • T2DM  [E11.9]  Yes   • Smoker [F17.200]  Yes      Resolved Hospital Problems   No resolved problems to display.        Brief Hospital Course to date:  Marquez Myrick is a 80 y.o. female with PMH significant for follicular lymphoma, HTN, HLD, hypothyroidism, T2DM, ongoing smoking.  The patient presented to [facility] on 7/31/2020 with right MCA stroke and underwent thrombectomy by Dr. Pugh.      --Followed by neuro team  --Continue ASA, plavix  and statin  --Add losartan 50 mg today daily (was on 100 mg at home) titrate for  or a little higher  --Speech reccs noted  --ECHO  - negative for PFO  --CT repeated yesterday - showed evolving infarct, negative for hemorrhage  --Continue basal/bolus insulin  --Encourage smoking cessation  --PT/OT/CM - Plan is to go to [facility] at discharge. Will need Abbott test - COVID prior to transfer when bed available.    DVT Prophylaxis:  [facility]      Disposition: I expect the patient to be discharged TBD to acute rehab    CODE STATUS:   Code Status and Medical Interventions:   Ordered at: 07/31/20 0803     Code Status:    CPR      Medical Interventions (Level of Support Prior to Arrest):    Full       Electronically signed by Veronica Chen MD, 08/04/20, 12:24.

## 2020-08-04 NOTE — PROGRESS NOTES
Continued Stay Note  [facility]     Patient Name: Marquez Myrick  MRN: [id number]  Today's Date: 8/4/2020    Admit Date: 7/31/2020    Discharge Plan     Row Name 08/04/20 1114       Plan    Plan  Rehab    Patient/Family in Agreement with Plan  yes    Plan Comments  Met with patient, daughter Gaby and Neurology in the room.  They are speaking to patient about rehab and medical readiness for transfer.  Discussed with physician, patient and daughter that [facility] has the rehab referral and this CM will tell them she is ready for rehab.  Feeding herself, talking to daughter and physician.  Contacted Flower with [facility] .  Informed her that patient is ready for rehab.  She will look at the notes and call CM back.  Will determine transport requirements and arrange.  Following     Final Discharge Disposition Code  03 - skilled nursing facility (SNF)        Discharge Codes    No documentation.       Expected Discharge Date and Time     Expected Discharge Date Expected Discharge Time    Aug 6, 2020             Trish Rutledge RN

## 2020-08-04 NOTE — THERAPY TREATMENT NOTE
Acute Care - Speech Language Pathology Treatment Note  [facility]     Patient Name: Marquez Myrick  : 1940  MRN: [id number]  Today's Date: 2020         Admit Date: 2020    Visit Dx:      ICD-10-CM ICD-9-CM   1. Acute ischemic stroke (CMS/HCC) I63.9 434.91   2. Dysarthria R47.1 784.51   3. Impaired mobility and ADLs Z74.09 V49.89    Z78.9    4. Oropharyngeal dysphagia R13.12 787.22   5. Cognitive communication deficit R41.841 799.52     Patient Active Problem List   Diagnosis   • H/O grade II follicular lymphoma    • Hypertension   • Dyslipidemia   • Hypothyroidism   • T2DM    • AIS    • Smoker        Therapy Treatment  Rehabilitation Treatment Summary     Row Name 20 0920             Treatment Time/Intention    Discipline  speech language pathologist  -CH      Document Type  therapy note (daily note)  -CH      Subjective Information  no complaints  -CH      Mode of Treatment  individual therapy;speech-language pathology  -CH      Patient/Family Observations  Dtr present  -CH      Therapy Frequency (Swallow)  5 days per week  -CH      Therapy Frequency (SLP SLC)  5 days per week  -CH      Patient Effort  excellent  -CH      Existing Precautions/Restrictions  fall;oxygen therapy device and L/min  -CH      Recorded by [CH] Brianna Smith, MS CCC-SLP 20 1006      Row Name                Wound 20 Left labia Blisters    Wound - Properties Group Date first assessed: 20 [DS] Time first assessed:  [DS2] Present on Hospital Admission: N [DS] Side: Left [DS] Location: labia [DS] Primary Wound Type: Blisters [DS], one fluid filled blister  Recorded by:  [DS] Alison Turcios RN 20 [DS2] Alison Turcios RN 20    Row Name                Wound 20 Bilateral anterior thigh Pressure Injury    Wound - Properties Group Date first assessed: 20 [DS] Time first assessed:  [DS] Present on Hospital Admission: N [DS] Side: Bilateral [DS]  Orientation: anterior [DS] Location: thigh [DS] Primary Wound Type: Pressure inj [DS] Stage, Pressure Injury: Stage 1 [DS] Recorded by:  [DS] Alison Turcios RN 08/01/20 2138    Row Name 08/04/20 0920             Outcome Summary/Treatment Plan (SLP)    Daily Summary of Progress (SLP)  progress toward functional goals as expected  -      Plan for Continued Treatment (SLP)  Continue to follow to address dysphagia, speech and cognition  -      Anticipated Dischage Disposition (SLP)  inpatient rehabilitation facility;anticipate therapy at next level of care  -CH      Recorded by [CH] Brianna Smith MS CCC-SLP 08/04/20 1006        User Key  (r) = Recorded By, (t) = Taken By, (c) = Cosigned By    Initials Name Effective Dates Discipline    CH Brianna Smith MS CCC-SLP 02/14/19 -  SLP    DS Alison Turcios RN 06/20/20 -  Nurse          EDUCATION  The patient has been educated in the following areas:   Communication Impairment.    SLP Recommendation and Plan  Daily Summary of Progress (SLP): progress toward functional goals as expected     Plan for Continued Treatment (SLP): Continue to follow to address dysphagia, speech and cognition  Anticipated Dischage Disposition (SLP): inpatient rehabilitation facility, anticipate therapy at next level of care             SLP GOALS     Row Name 08/04/20 0920 08/03/20 1330 08/02/20 0930       Oral Nutrition/Hydration Goal 1 (SLP)    Oral Nutrition/Hydration Goal 1, SLP  LTG: Tolerate soft diet with nectar-thick liquids without s/s aspiration with 100% accuracy and no cues  -CH  LTG: Tolerate soft diet with nectar-thick liquids without s/s aspiration with 100% accuracy and no cues  -CH  LTG: Tolerate soft diet with nectar-thick liquids without s/s aspiration with 100% accuracy and no cues  -SM    Time Frame (Oral Nutrition/Hydration Goal 1, SLP)  by discharge  -CH  by discharge  -  by discharge  -SM    Barriers (Oral Nutrition/Hydration Goal 1, SLP)  Patient is tolerating  pureed and NT liquids with occ cues for lingual sweep, alt liquids and solids and small single bites/sips.  -CH  --  --    Progress/Outcomes (Oral Nutrition/Hydration Goal 1, SLP)  continuing progress toward goal  -CH  continuing progress toward goal  -CH  --       Oral Nutrition/Hydration Goal 2 (SLP)    Oral Nutrition/Hydration Goal 2, SLP  Tolerate ice chips and thin H2O without s/s aspiration (wet vocal quality, cough, often delayed) with 80% accuracy and no cues.  -CH  Tolerate ice chips and thin H2O without s/s aspiration (wet vocal quality, cough, often delayed) with 80% accuracy and no cues.  -CH  Tolerate ice chips and thin H2O without s/s aspiration (wet vocal quality, cough, often delayed) with 80% accuracy and no cues.  -SM    Time Frame (Oral Nutrition/Hydration Goal 2, SLP)  short term goal (STG)  -CH  short term goal (STG)  -CH  short term goal (STG)  -SM    Barriers (Oral Nutrition/Hydration Goal 2, SLP)  delayed cough with ice chips  -CH  --  --    Progress/Outcomes (Oral Nutrition/Hydration Goal 2, SLP)  continuing progress toward goal  -CH  --  --       Labial Strengthening Goal 1 (SLP)    Activity (Labial Strengthening Goal 1, SLP)  increase labial tone  -CH  increase labial tone  -CH  increase labial tone  -SM    Increase Labial Tone  labial resistance exercises;swallow trials  -  labial resistance exercises;swallow trials  -  labial resistance exercises;swallow trials  -SM    [place]/Accuracy (Labial Strengthening Goal 1, SLP)  independently (over 90% accuracy)  -CH  independently (over 90% accuracy)  -CH  independently (over 90% accuracy)  -SM    Time Frame (Labial Strengthening Goal 1, SLP)  short term goal (STG)  -CH  short term goal (STG)  -CH  short term goal (STG)  -SM    Barriers (Labial Strengthening Goal 1, SLP)  Handout and explanation provided. Pt. completed with min cues.   -CH  --  --    Progress/Outcomes (Labial Strengthening Goal 1, SLP)  good progress toward goal  -CH   --  --       Lingual Strengthening Goal 1 (SLP)    Activity (Lingual Strengthening Goal 1, SLP)  increase lingual tone/sensation/control/coordination/movement;increase tongue back strength  -CH  increase lingual tone/sensation/control/coordination/movement;increase tongue back strength  -CH  increase lingual tone/sensation/control/coordination/movement;increase tongue back strength  -SM    Increase Lingual Tone/Sensation/Control/Coordination/Movement  swallow trials;lingual resistance exercises  -  swallow trials;lingual resistance exercises  -  swallow trials;lingual resistance exercises  -SM    Increase Tongue Back Strength  lingual resistance exercises  -  lingual resistance exercises  -  lingual resistance exercises  -SM    [place]/Accuracy (Lingual Strengthening Goal 1, SLP)  independently (over 90% accuracy)  -CH  independently (over 90% accuracy)  -CH  independently (over 90% accuracy)  -SM    Time Frame (Lingual Strengthening Goal 1, SLP)  short term goal (STG)  -CH  short term goal (STG)  -CH  short term goal (STG)  -SM    Barriers (Lingual Strengthening Goal 1, SLP)  Handout and explanation provided. Pt. completed with min cues.   -CH  --  --    Progress/Outcomes (Lingual Strengthening Goal 1, SLP)  good progress toward goal  -CH  --  --       Pharyngeal Strengthening Exercise Goal 1 (SLP)    Activity (Pharyngeal Strengthening Goal 1, SLP)  increase timing;increase anterior movement of the hyolaryngeal complex;increase squeeze/positive pressure generation  -  increase timing;increase anterior movement of the hyolaryngeal complex;increase squeeze/positive pressure generation  -  increase timing;increase anterior movement of the hyolaryngeal complex;increase squeeze/positive pressure generation  -SM    Increase Timing  prepping - 3 second prep or suck swallow or 3-step swallow;supraglottic swallow  -  prepping - 3 second prep or suck swallow or 3-step swallow;supraglottic swallow  -CH   prepping - 3 second prep or suck swallow or 3-step swallow;supraglottic swallow  -SM    Increase Anterior Movement of the Hyolaryngeal Complex  chin tuck against resistance (CTAR)  -CH  chin tuck against resistance (CTAR)  -CH  chin tuck against resistance (CTAR)  -SM    Increase Squeeze/Positive Pressure Generation  hard effortful swallow  -CH  hard effortful swallow  -CH  hard effortful swallow  -SM    [place]/Accuracy (Pharyngeal Strengthening Goal 1, SLP)  independently (over 90% accuracy)  -CH  independently (over 90% accuracy)  -CH  independently (over 90% accuracy)  -SM    Time Frame (Pharyngeal Strengthening Goal 1, SLP)  short term goal (STG)  -CH  short term goal (STG)  -CH  short term goal (STG)  -SM    Barriers (Pharyngeal Strengthening Goal 1, SLP)  Handout and explanation provided. Pt. completed with min cues.   -CH  --  --    Progress/Outcomes (Pharyngeal Strengthening Goal 1, SLP)  good progress toward goal  -CH  --  --       Swallow Management Recall Goal 1 (SLP)    Activity (Swallow Management Recall Goal 1, SLP)  safe diet/liquid level;compensatory swallow strategies/techniques;rationale for use of strategies/techniques  -  safe diet/liquid level;compensatory swallow strategies/techniques;rationale for use of strategies/techniques  -  safe diet/liquid level;compensatory swallow strategies/techniques;rationale for use of strategies/techniques  -SM    [place]/Accuracy (Swallow Management Recall Goal 1, SLP)  independently (over 90% accuracy)  -CH  independently (over 90% accuracy)  -CH  independently (over 90% accuracy)  -SM    Time Frame (Swallow Management Recall Goal 1, SLP)  short term goal (STG)  -CH  short term goal (STG)  -CH  short term goal (STG)  -SM    Barriers (Swallow Management Recall Goal 1, SLP)  Patient and dtr able to restate. Dtr trained with use of thickener to thicken drinks to NT. Dr naeem demonstrated without difficulty.   -CH  --  --    Progress/Outcomes  (Swallow Management Recall Goal 1, SLP)  good progress toward goal  -CH  --  --       Swallow Compensatory Strategies Goal 1 (SLP)    Activity (Swallow Compensatory Strategies/Techniques Goal 1, SLP)  compensatory strategies;during meal intake;during p.o. trials;small bites;small cup sips;food/liquid placed on stronger right side;alternate food/liquid intake  -CH  compensatory strategies;during meal intake;during p.o. trials;small bites;small cup sips;food/liquid placed on stronger right side;alternate food/liquid intake  -CH  compensatory strategies;during meal intake;during p.o. trials;small bites;small cup sips;small straw sips  -SM    [place]/Accuracy (Swallow Compensatory Strategies/Techniques Goal 1, SLP)  independently (over 90% accuracy)  -CH  independently (over 90% accuracy)  -CH  independently (over 90% accuracy)  -SM    Time Frame (Swallow Compensatory Strategies/Techniques Goal 1, SLP)  short term goal (STG)  -CH  short term goal (STG)  -CH  short term goal (STG)  -SM    Barriers (Swallow Compensatory Strategies/Techniques Goal 1, SLP)  Pt and dtr able to return demonstrate after review.   -CH  --  --    Progress/Outcomes (Swallow Compensatory Strategies/Techniques Goal 1, SLP)  continuing progress toward goal  -CH  goal revised this date  -CH  --       Articulation Goal 1 (SLP)    Improve Articulation Goal 1 (SLP)  by over-articulating at word level;80%;with minimal cues (75-90%)  -CH  by over-articulating at word level;80%;with minimal cues (75-90%)  -CH  by over-articulating at word level;80%;with minimal cues (75-90%)  -SM    Time Frame (Articulation Goal 1, SLP)  short term goal (STG)  -CH  short term goal (STG)  -CH  short term goal (STG)  -SM    Progress (Articulation Goal 1, SLP)  with moderate cues (50-74%);60%  -CH  50%;with moderate cues (50-74%)  -CH  --    Progress/Outcomes (Articulation Goal 1, SLP)  continuing progress toward goal  -CH  continuing progress toward goal  -CH  goal  ongoing  -SM       Attention Goal 1 (SLP)    Improve Attention by Goal 1 (SLP)  complete sustained attention task;100%;with minimal cues (75-90%)  -  complete sustained attention task;100%;with minimal cues (75-90%)  -CH  complete sustained attention task;100%;with minimal cues (75-90%)  -SM    Time Frame (Attention Goal 1, SLP)  short term goal (STG)  -CH  short term goal (STG)  -CH  short term goal (STG)  -SM    Progress (Attention Goal 1, SLP)  90%;with minimal cues (75-90%)  -CH  90%;with moderate cues (50-74%)  -CH  90%;with moderate cues (50-74%)  -SM    Progress/Outcomes (Attention Goal 1, SLP)  good progress toward goal  -CH  good progress toward goal  -CH  good progress toward goal  -SM       Right Hemisphere Function Goal 1 (SLP)    Improve Right Hemisphere Function Through Goal 1 (SLP)  demonstrate awareness of communication partner in left visual field;use compensatory strategies for left neglect;identify physical/cognitive strengths and limitations;70%;with moderate cues (50-74%)  -  demonstrate awareness of communication partner in left visual field;use compensatory strategies for left neglect;identify physical/cognitive strengths and limitations;70%;with moderate cues (50-74%)  -CH  demonstrate awareness of communication partner in left visual field;use compensatory strategies for left neglect;identify physical/cognitive strengths and limitations;70%;with moderate cues (50-74%)  -SM    Time Frame (Right Hemisphere Function Goal 1, SLP)  short term goal (STG)  -CH  short term goal (STG)  -CH  short term goal (STG)  -SM    Progress (Right Hemisphere Function Goal 1, SLP)  90%;with minimal cues (75-90%)  -CH  80%;with minimal cues (75-90%)  -CH  50%;with minimal cues (75-90%)  -SM    Progress/Outcomes (Right Hemisphere Function Goal 1, SLP)  good progress toward goal  -CH  good progress toward goal  -CH  good progress toward goal  -SM    Comment (Right Hemisphere Function Goal 1, SLP)  --  --   targeted only L neglect tasks - attending to stimuli on midline to slightly left side.   -SM       Additional Goal 1 (SLP)    Additional Goal 1, SLP  LTG: Improve cog-comm skills in order to participate in care while in hospital setting with 100% accuracy and min cues  -CH  LTG: Improve cog-comm skills in order to participate in care while in hospital setting with 100% accuracy and min cues  -CH  LTG: Improve cog-comm skills in order to participate in care while in hospital setting with 100% accuracy and min cues  -SM    Time Frame (Additional Goal 1, SLP)  by discharge  -CH  by discharge  -CH  by discharge  -SM    Progress/Outcomes (Additional Goal 1, SLP)  continuing progress toward goal  -CH  good progress toward goal  -CH  good progress toward goal  -SM      User Key  (r) = Recorded By, (t) = Taken By, (c) = Cosigned By    Initials Name Provider Type    Lisseth Martinez MS CCC-SLP Speech and Language Pathologist    Brianna Rosario MS CCC-SLP Speech and Language Pathologist              Time Calculation:     Time Calculation- SLP     Row Name 08/04/20 1018             Time Calculation- SLP    SLP Start Time  0920  -      SLP Received On  08/04/20  -        User Key  (r) = Recorded By, (t) = Taken By, (c) = Cosigned By    Initials Name Provider Type    Brianna Rosario MS CCC-SLP Speech and Language Pathologist          Therapy Charges for Today     Code Description Service Date Service Provider Modifiers Qty    67613768868 HC ST EVAL ORAL PHARYNG SWALLOW 4 8/3/2020 Brianna Smith MS CCC-SLP GN 1    26536129493 HC ST TREATMENT SPEECH 3 8/3/2020 Brianna Smith MS CCC-SLP GN 1    62173070045 HC ST TREATMENT SPEECH 3 8/4/2020 Brianna Smith MS CCC-SLP GN 1    70014764388 HC ST TREATMENT SWALLOW 3 8/4/2020 Brianna Smith MS CCC-SLP GN 1    09059808816 HC ST SELF CARE/MGMT/TRAIN EA 15 MIN 8/4/2020 Brianna Smith MS CCC-SLP GN 1                     Brianna Smith MS  CCC-SLP  2020   and Acute Care - Speech Language Pathology   Swallow Treatment Note  [place]     Patient Name: Marquez Myrick  : 1940  MRN: [id number]  Today's Date: 2020  Onset of Illness/Injury or Date of Surgery: 20     Referring Physician: BRITTNEE Coyle      Admit Date: 2020    Visit Dx:      ICD-10-CM ICD-9-CM   1. Acute ischemic stroke (CMS/HCC) I63.9 434.91   2. Dysarthria R47.1 784.51   3. Impaired mobility and ADLs Z74.09 V49.89    Z78.9    4. Oropharyngeal dysphagia R13.12 787.22   5. Cognitive communication deficit R41.841 799.52     Patient Active Problem List   Diagnosis   • H/O grade II follicular lymphoma    • Hypertension   • Dyslipidemia   • Hypothyroidism   • T2DM    • AIS    • Smoker       Therapy Treatment  Rehabilitation Treatment Summary     Row Name 20 0920             Treatment Time/Intention    Discipline  speech language pathologist  -CH      Document Type  therapy note (daily note)  -CH      Subjective Information  no complaints  -CH      Mode of Treatment  individual therapy;speech-language pathology  -CH      Patient/Family Observations  Dtr present  -CH      Therapy Frequency (Swallow)  5 days per week  -CH      Therapy Frequency (SLP SLC)  5 days per week  -CH      Patient Effort  excellent  -CH      Existing Precautions/Restrictions  fall;oxygen therapy device and L/min  -CH      Recorded by [CH] Brianna Smith, MS CCC-SLP 20 1006      Row Name                Wound 20 Left labia Blisters    Wound - Properties Group Date first assessed: 20 [DS] Time first assessed:  [2] Present on Hospital Admission: N [DS] Side: Left [DS] Location: labia [DS] Primary Wound Type: Blisters [DS], one fluid filled blister  Recorded by:  [DS] Alison Turcios RN 20 [DS2] Alison Turcios RN 20    Row Name                Wound 20 Bilateral anterior thigh Pressure Injury    Wound - Properties Group Date  first assessed: 08/01/20 [DS] Time first assessed: 2000 [DS] Present on Hospital Admission: N [DS] Side: Bilateral [DS] Orientation: anterior [DS] Location: thigh [DS] Primary Wound Type: Pressure inj [DS] Stage, Pressure Injury: Stage 1 [DS] Recorded by:  [DS] Alison Turcios RN 08/01/20 2138    Row Name 08/04/20 0920             Outcome Summary/Treatment Plan (SLP)    Daily Summary of Progress (SLP)  progress toward functional goals as expected  -CH      Plan for Continued Treatment (SLP)  Continue to follow to address dysphagia, speech and cognition  -CH      Anticipated Dischage Disposition (SLP)  inpatient rehabilitation facility;anticipate therapy at next level of care  -CH      Recorded by [CH] Brianna Smith MS CCC-SLP 08/04/20 1006        User Key  (r) = Recorded By, (t) = Taken By, (c) = Cosigned By    Initials Name Effective Dates Discipline     Brianna Smith MS CCC-SLP 02/14/19 -  SLP    DS Alison Turcios RN 06/20/20 -  Nurse          Outcome Summary  Outcome Summary/Treatment Plan (SLP)  Daily Summary of Progress (SLP): progress toward functional goals as expected (08/04/20 0920 : Brianna Smith MS CCC-SLP)  Plan for Continued Treatment (SLP): Continue to follow to address dysphagia, speech and cognition (08/04/20 0920 : Brianna Smith MS CCC-SLP)  Anticipated Dischage Disposition (SLP): inpatient rehabilitation facility, anticipate therapy at next level of care (08/04/20 0920 : Brianna Smith MS CCC-SLP)      SLP GOALS     Row Name 08/04/20 0920 08/03/20 1330 08/02/20 0930       Oral Nutrition/Hydration Goal 1 (SLP)    Oral Nutrition/Hydration Goal 1, SLP  LTG: Tolerate soft diet with nectar-thick liquids without s/s aspiration with 100% accuracy and no cues  -CH  LTG: Tolerate soft diet with nectar-thick liquids without s/s aspiration with 100% accuracy and no cues  -CH  LTG: Tolerate soft diet with nectar-thick liquids without s/s aspiration with 100% accuracy and no cues  -     Time Frame (Oral Nutrition/Hydration Goal 1, SLP)  by discharge  -CH  by discharge  -CH  by discharge  -SM    Barriers (Oral Nutrition/Hydration Goal 1, SLP)  Patient is tolerating pureed and NT liquids with occ cues for lingual sweep, alt liquids and solids and small single bites/sips.  -CH  --  --    Progress/Outcomes (Oral Nutrition/Hydration Goal 1, SLP)  continuing progress toward goal  -CH  continuing progress toward goal  -CH  --       Oral Nutrition/Hydration Goal 2 (SLP)    Oral Nutrition/Hydration Goal 2, SLP  Tolerate ice chips and thin H2O without s/s aspiration (wet vocal quality, cough, often delayed) with 80% accuracy and no cues.  -CH  Tolerate ice chips and thin H2O without s/s aspiration (wet vocal quality, cough, often delayed) with 80% accuracy and no cues.  -CH  Tolerate ice chips and thin H2O without s/s aspiration (wet vocal quality, cough, often delayed) with 80% accuracy and no cues.  -SM    Time Frame (Oral Nutrition/Hydration Goal 2, SLP)  short term goal (STG)  -CH  short term goal (STG)  -CH  short term goal (STG)  -SM    Barriers (Oral Nutrition/Hydration Goal 2, SLP)  delayed cough with ice chips  -CH  --  --    Progress/Outcomes (Oral Nutrition/Hydration Goal 2, SLP)  continuing progress toward goal  -CH  --  --       Labial Strengthening Goal 1 (SLP)    Activity (Labial Strengthening Goal 1, SLP)  increase labial tone  -CH  increase labial tone  -CH  increase labial tone  -SM    Increase Labial Tone  labial resistance exercises;swallow trials  -CH  labial resistance exercises;swallow trials  -CH  labial resistance exercises;swallow trials  -SM    [place]/Accuracy (Labial Strengthening Goal 1, SLP)  independently (over 90% accuracy)  -CH  independently (over 90% accuracy)  -CH  independently (over 90% accuracy)  -SM    Time Frame (Labial Strengthening Goal 1, SLP)  short term goal (STG)  -CH  short term goal (STG)  -CH  short term goal (STG)  -SM    Barriers (Labial  Strengthening Goal 1, SLP)  Handout and explanation provided. Pt. completed with min cues.   -CH  --  --    Progress/Outcomes (Labial Strengthening Goal 1, SLP)  good progress toward goal  -CH  --  --       Lingual Strengthening Goal 1 (SLP)    Activity (Lingual Strengthening Goal 1, SLP)  increase lingual tone/sensation/control/coordination/movement;increase tongue back strength  -CH  increase lingual tone/sensation/control/coordination/movement;increase tongue back strength  -CH  increase lingual tone/sensation/control/coordination/movement;increase tongue back strength  -SM    Increase Lingual Tone/Sensation/Control/Coordination/Movement  swallow trials;lingual resistance exercises  -CH  swallow trials;lingual resistance exercises  -CH  swallow trials;lingual resistance exercises  -SM    Increase Tongue Back Strength  lingual resistance exercises  -CH  lingual resistance exercises  -  lingual resistance exercises  -SM    [place]/Accuracy (Lingual Strengthening Goal 1, SLP)  independently (over 90% accuracy)  -CH  independently (over 90% accuracy)  -CH  independently (over 90% accuracy)  -SM    Time Frame (Lingual Strengthening Goal 1, SLP)  short term goal (STG)  -CH  short term goal (STG)  -CH  short term goal (STG)  -SM    Barriers (Lingual Strengthening Goal 1, SLP)  Handout and explanation provided. Pt. completed with min cues.   -CH  --  --    Progress/Outcomes (Lingual Strengthening Goal 1, SLP)  good progress toward goal  -CH  --  --       Pharyngeal Strengthening Exercise Goal 1 (SLP)    Activity (Pharyngeal Strengthening Goal 1, SLP)  increase timing;increase anterior movement of the hyolaryngeal complex;increase squeeze/positive pressure generation  -CH  increase timing;increase anterior movement of the hyolaryngeal complex;increase squeeze/positive pressure generation  -CH  increase timing;increase anterior movement of the hyolaryngeal complex;increase squeeze/positive pressure generation  -SM     Increase Timing  prepping - 3 second prep or suck swallow or 3-step swallow;supraglottic swallow  -CH  prepping - 3 second prep or suck swallow or 3-step swallow;supraglottic swallow  -CH  prepping - 3 second prep or suck swallow or 3-step swallow;supraglottic swallow  -SM    Increase Anterior Movement of the Hyolaryngeal Complex  chin tuck against resistance (CTAR)  -CH  chin tuck against resistance (CTAR)  -CH  chin tuck against resistance (CTAR)  -SM    Increase Squeeze/Positive Pressure Generation  hard effortful swallow  -CH  hard effortful swallow  -CH  hard effortful swallow  -SM    [place]/Accuracy (Pharyngeal Strengthening Goal 1, SLP)  independently (over 90% accuracy)  -CH  independently (over 90% accuracy)  -CH  independently (over 90% accuracy)  -SM    Time Frame (Pharyngeal Strengthening Goal 1, SLP)  short term goal (STG)  -CH  short term goal (STG)  -CH  short term goal (STG)  -SM    Barriers (Pharyngeal Strengthening Goal 1, SLP)  Handout and explanation provided. Pt. completed with min cues.   -CH  --  --    Progress/Outcomes (Pharyngeal Strengthening Goal 1, SLP)  good progress toward goal  -CH  --  --       Swallow Management Recall Goal 1 (SLP)    Activity (Swallow Management Recall Goal 1, SLP)  safe diet/liquid level;compensatory swallow strategies/techniques;rationale for use of strategies/techniques  -  safe diet/liquid level;compensatory swallow strategies/techniques;rationale for use of strategies/techniques  -  safe diet/liquid level;compensatory swallow strategies/techniques;rationale for use of strategies/techniques  -SM    [place]/Accuracy (Swallow Management Recall Goal 1, SLP)  independently (over 90% accuracy)  -CH  independently (over 90% accuracy)  -CH  independently (over 90% accuracy)  -SM    Time Frame (Swallow Management Recall Goal 1, SLP)  short term goal (STG)  -CH  short term goal (STG)  -CH  short term goal (STG)  -SM    Barriers (Swallow Management  Recall Goal 1, SLP)  Patient and dtr able to restate. Dtr trained with use of thickener to thicken drinks to NT. Dr return demonstrated without difficulty.   -CH  --  --    Progress/Outcomes (Swallow Management Recall Goal 1, SLP)  good progress toward goal  -CH  --  --       Swallow Compensatory Strategies Goal 1 (SLP)    Activity (Swallow Compensatory Strategies/Techniques Goal 1, SLP)  compensatory strategies;during meal intake;during p.o. trials;small bites;small cup sips;food/liquid placed on stronger right side;alternate food/liquid intake  -CH  compensatory strategies;during meal intake;during p.o. trials;small bites;small cup sips;food/liquid placed on stronger right side;alternate food/liquid intake  -CH  compensatory strategies;during meal intake;during p.o. trials;small bites;small cup sips;small straw sips  -SM    [place]/Accuracy (Swallow Compensatory Strategies/Techniques Goal 1, SLP)  independently (over 90% accuracy)  -CH  independently (over 90% accuracy)  -CH  independently (over 90% accuracy)  -SM    Time Frame (Swallow Compensatory Strategies/Techniques Goal 1, SLP)  short term goal (STG)  -CH  short term goal (STG)  -CH  short term goal (STG)  -SM    Barriers (Swallow Compensatory Strategies/Techniques Goal 1, SLP)  Pt and dtr able to return demonstrate after review.   -CH  --  --    Progress/Outcomes (Swallow Compensatory Strategies/Techniques Goal 1, SLP)  continuing progress toward goal  -CH  goal revised this date  -CH  --       Articulation Goal 1 (SLP)    Improve Articulation Goal 1 (SLP)  by over-articulating at word level;80%;with minimal cues (75-90%)  -CH  by over-articulating at word level;80%;with minimal cues (75-90%)  -CH  by over-articulating at word level;80%;with minimal cues (75-90%)  -SM    Time Frame (Articulation Goal 1, SLP)  short term goal (STG)  -CH  short term goal (STG)  -CH  short term goal (STG)  -SM    Progress (Articulation Goal 1, SLP)  with moderate cues  (50-74%);60%  -CH  50%;with moderate cues (50-74%)  -CH  --    Progress/Outcomes (Articulation Goal 1, SLP)  continuing progress toward goal  -CH  continuing progress toward goal  -CH  goal ongoing  -SM       Attention Goal 1 (SLP)    Improve Attention by Goal 1 (SLP)  complete sustained attention task;100%;with minimal cues (75-90%)  -CH  complete sustained attention task;100%;with minimal cues (75-90%)  -CH  complete sustained attention task;100%;with minimal cues (75-90%)  -SM    Time Frame (Attention Goal 1, SLP)  short term goal (STG)  -CH  short term goal (STG)  -CH  short term goal (STG)  -SM    Progress (Attention Goal 1, SLP)  90%;with minimal cues (75-90%)  -CH  90%;with moderate cues (50-74%)  -CH  90%;with moderate cues (50-74%)  -SM    Progress/Outcomes (Attention Goal 1, SLP)  good progress toward goal  -CH  good progress toward goal  -CH  good progress toward goal  -SM       Right Hemisphere Function Goal 1 (SLP)    Improve Right Hemisphere Function Through Goal 1 (SLP)  demonstrate awareness of communication partner in left visual field;use compensatory strategies for left neglect;identify physical/cognitive strengths and limitations;70%;with moderate cues (50-74%)  -CH  demonstrate awareness of communication partner in left visual field;use compensatory strategies for left neglect;identify physical/cognitive strengths and limitations;70%;with moderate cues (50-74%)  -CH  demonstrate awareness of communication partner in left visual field;use compensatory strategies for left neglect;identify physical/cognitive strengths and limitations;70%;with moderate cues (50-74%)  -SM    Time Frame (Right Hemisphere Function Goal 1, SLP)  short term goal (STG)  -CH  short term goal (STG)  -CH  short term goal (STG)  -SM    Progress (Right Hemisphere Function Goal 1, SLP)  90%;with minimal cues (75-90%)  -CH  80%;with minimal cues (75-90%)  -CH  50%;with minimal cues (75-90%)  -SM    Progress/Outcomes (Right  Hemisphere Function Goal 1, SLP)  good progress toward goal  -CH  good progress toward goal  -CH  good progress toward goal  -SM    Comment (Right Hemisphere Function Goal 1, SLP)  --  --  targeted only L neglect tasks - attending to stimuli on midline to slightly left side.   -SM       Additional Goal 1 (SLP)    Additional Goal 1, SLP  LTG: Improve cog-comm skills in order to participate in care while in hospital setting with 100% accuracy and min cues  -CH  LTG: Improve cog-comm skills in order to participate in care while in hospital setting with 100% accuracy and min cues  -CH  LTG: Improve cog-comm skills in order to participate in care while in hospital setting with 100% accuracy and min cues  -SM    Time Frame (Additional Goal 1, SLP)  by discharge  -CH  by discharge  -CH  by discharge  -SM    Progress/Outcomes (Additional Goal 1, SLP)  continuing progress toward goal  -CH  good progress toward goal  -CH  good progress toward goal  -SM      User Key  (r) = Recorded By, (t) = Taken By, (c) = Cosigned By    Initials Name Provider Type    Lisseth Martinez, MS CCC-SLP Speech and Language Pathologist    Brianna Rosario MS CCC-SLP Speech and Language Pathologist          EDUCATION  The patient has been educated in the following areas:   Home Exercise Program (HEP) Dysphagia (Swallowing Impairment) Oral Care/Hydration Modified Diet Instruction.    SLP Recommendation and Plan  Daily Summary of Progress (SLP): progress toward functional goals as expected     Plan for Continued Treatment (SLP): Continue to follow to address dysphagia, speech and cognition  Anticipated Dischage Disposition (SLP): inpatient rehabilitation facility, anticipate therapy at next level of care                    Time Calculation:   Time Calculation- SLP     Row Name 08/04/20 1018             Time Calculation- SLP    SLP Start Time  0920  -      SLP Received On  08/04/20  -        User Key  (r) = Recorded By, (t) = Taken By,  (c) = Cosigned By    Initials Name Provider Type    Brianna Rosario MS CCC-SLP Speech and Language Pathologist          Therapy Charges for Today     Code Description Service Date Service Provider Modifiers Qty    11521820171 HC ST EVAL ORAL PHARYNG SWALLOW 4 8/3/2020 Luis MS ELINA Reed-SLP GN 1    [id number] HC ST TREATMENT SPEECH 3 8/3/2020 Luis MS ELINA Reed-SLP GN 1    [id number] HC ST TREATMENT SPEECH 3 8/4/2020 Brianna Smith MS CCC-SLP GN 1    [id number] HC ST TREATMENT SWALLOW 3 8/4/2020 Luis MS ELINA Reed-SLP GN 1    [id number] HC ST SELF CARE/MGMT/TRAIN EA 15 MIN 8/4/2020 Brianna Smith MS CCC-SLP GN 1            Patient was not wearing a face mask and did exhibit coughing during this therapy encounter.  Procedure performed was aerosolizing, involved close contact (within 6 feet for at least 15 minutes or longer), and did not involve contact with infectious secretions or specimens.  Therapist used appropriate personal protective equipment including gloves, standard procedure mask and eye protection.  Appropriate PPE was worn during the entire therapy session.  Hand hygiene was completed before and after therapy session.         MS BJ Zurita  8/4/2020

## 2020-08-04 NOTE — PLAN OF CARE
Problem: Patient Care Overview  Goal: Plan of Care Review  Flowsheets  Taken 8/4/2020 1215  Progress: improving  Plan of Care Reviewed With: patient  Taken 8/4/2020 1211  Outcome Summary: Pt transitioned to eob with min assist, transfers and ambul 60 ft with min assist x2 and r wx. Pt requires cues and facilitation for wt shift and to reinforce normal gait pattern. She is an excellent rehab candidate for in rehab

## 2020-08-04 NOTE — PROGRESS NOTES
Met with patient, daughter Gaby and Neurologist.  Discussed rehab and neuro states patient ready for rehab.  Call to [facility] with [facility] who has pre-referral for this patient for rehab.  Informed her patient is ready and she states she will check her notes.  Following.

## 2020-08-04 NOTE — PROGRESS NOTES
Neurology Follow Up Note    HOD # (5)  CVA S/P thrombectomy for right M2 occlusion     No events last night. Patient was somnolent much of the day yesterday. This morning she is more alert and awake enough to carry on conversation and participate in SLP therapy session. She is feeding herself this morning. No confusion, although she did forget that her daughter had been here yesterday. Daughter has many questions about the logistics of how long she will be at [facility], how she will get her back to [facility]. Discussed at length with daughter. Explained that this is a process and sometimes its hard to put a number on rehab. Will defer to PT/OT/SLP for guidance on this.         AIS     H/O grade II follicular lymphoma     Hypertension    Dyslipidemia    Hypothyroidism    T2DM     Smoker      Temp:  [98.5 °F (36.9 °C)-99.4 °F (37.4 °C)] 98.5 °F (36.9 °C)  Heart Rate:  [70-89] 71  Resp:  [18] 18  BP: (139-194)/(73-86) 194/78      Intake/Output Summary (Last 24 hours) at 8/4/2020 0913  Last data filed at 8/4/2020 0300  Gross per 24 hour   Intake --   Output 150 ml   Net -150 ml       Vital signs, labs, and pertinent tests were reviewed.    24 hour post-thrombectomy CT scan revealed evolving right frontoparietal infarct, negative for hemorrhage.    Echocardiogram: EF 55%, negative for left atrial enlargement and/or PFO. Patient remains in NSR - no documented atrial fibrillation. Will need an event monitor.     Total Cholesterol 135, LDL 52, HDL 57, A1c 7.3    Last Documented NIHSS  Interval: handoff  1a. Level of Consciousness: 0-->Alert, keenly responsive  1b. LOC Questions: 0-->Answers both questions correctly  1c. LOC Commands: 0-->Performs both tasks correctly     3. Visual: 1-->Partial hemianopia  4. Facial Palsy: 2-->Partial paralysis (total or near-total paralysis of lower face)  5a. Motor Arm, Left: 1-->Drift, limb holds 90 (or 45) degrees, but drifts down before full 10 seconds, does not hit bed or other  support  5b. Motor Arm, Right: 0-->No drift, limb holds 90 (or 45) degrees for full 10 secs  6a. Motor Leg, Left: 1-->Drift, leg falls by the end of the 5-sec period but does not hit bed  6b. Motor Leg, Right: 0-->No drift, leg holds 30 degree position for full 5 secs  7. Limb Ataxia: 0-->Absent  8. Sensory: 0-->Normal, no sensory loss  9. Best Language: 0-->No aphasia, normal  10. Dysarthria: 1-->Mild-to-moderate dysarthria, patient slurs at least some words and, at worst, can be understood with some difficulty  11. Extinction and Inattention (formerly Neglect): 1-->Visual, tactile, auditory, spatial, or personal inattention or extinction to bilateral simultaneous stimulation in one of the sensory modalities    Total (NIH Stroke Scale): 8    EXAM        Pulm: effort normal  Cardio: rate normal  Skin: warm and dry  Psych: pleasant, cooperative. Not agitated.  HEENT: atraumatic  General: No acute distress, lethargic    Neurological Exam  Mental Status  Awake, alert and oriented to person, place and time.Alert. Recent memory a but altered but remote memory intact. Moderate dysarthria present. Language is fluent with no aphasia. Attention and concentration are normal.     Cranial Nerves  CN II: Visual fields full to confrontation.  CN III, IV, VI: Extraocular movements intact bilaterally.  CN V:  Right: Facial sensation is normal.  Left: Facial sensation is normal.   CN VII:  Right: There is no facial weakness.  Left: Left facial droop.  CN VIII: Hearing is normal.  CN XI: Shoulder shrug strength is normal.  CN XII: Tongue deviates to the left.     Motor   Strength is 5/5 in all four extremities except as noted. Left pronator drift.     Sensory  Light touch normal      Coordination  Right: Finger-to-nose normal.  Left: Finger-to-nose normal.     Gait  Not observed.    PLAN    1.  Right middle cerebral artery stroke.  Could be atheroembolic from her significant atherosclerotic disease extracranially, versus  cardioembolic given her advancing age.  Will recommend cardiac monitor at discharge.  Echocardiogram is negative for left atrial enlargement and/or PFO.  24-hour CT head was negative for hemorrhage. Continue on Plavix 75 mg in addition to her ASA 325mg and lipitor 80mg for secondary stroke prevention.      2.  Essential hypertension.  Continue permissive hypertension 140-180 for now; patient's symptoms are better with increased blood pressures. Overnight patients blood pressures were in 190's. Hospitalist team plans to restart 1 out of the 4 home blood pressure medications she usually takes. Slow normalize and monitor for neuro changes.     3.  Cerebral atherosclerosis.  Patient has significant atherosclerotic disease extracranially more than intracranially.  DAPT and high intensity statin.    4.  Diabetes mellitus type 2.  A1c 7.3.  Management per hospitalist.      5.  Smoking.  Discussed smoking cessation being an important part of secondary stroke prevention.     6.  Activity.  Ok for PT/OT to work with patient. Plan for discharge to [facility]     7.  Diet. Pureed, nectar thick. Working with SLP    8. Somnolence. Seems to be improving. Consider provigil if patient continues to be overly drowsy or unable to participate. Continue to monitor     I have reviewed the patient's imaging and discussed the plan of care with the patients daughter in law who is currently at the bedside.  She is in agreement and all questions have been answered.    I have discussed with Dr Ly and Dr Chen.    BRITTNEE Tony Extender/Stroke Navigator

## 2020-08-04 NOTE — PLAN OF CARE
Problem: Patient Care Overview  Goal: Plan of Care Review  Outcome: Ongoing (interventions implemented as appropriate)  Flowsheets (Taken 8/4/2020 1020)  Plan of Care Reviewed With: patient; daughter  Note:   SLP treatment and caregiver instruction completed. Will continue to address speech and dysphagia in treatment. Please see note for further details and recommendations.

## 2020-08-04 NOTE — PLAN OF CARE
Problem: Patient Care Overview  Goal: Plan of Care Review  Flowsheets (Taken 8/4/2020 1411)  Outcome Summary: Pt with improvement in use of R UE this date. Pt completes sit to stand with Mariama and ambulates with modAx1 due to difficutly keeping L hand on RW. Pt completed vel care with Mariama to maintain balance and completed all grooming sitting in chair sink side with Mariama from OT. Pt may benefit from platform RW. Pt completed AROM/AAROM x10 reps shoulder/elbow/wrist/hand. OT issued pink foam block and educated pt/daughter on completion of  exercise. Continue to recommend d/c to [facility] when medically ready.

## 2020-08-04 NOTE — THERAPY TREATMENT NOTE
Acute Care - Occupational Therapy Treatment Note  [facility]     Patient Name: Marquez Myrick  : 1940  MRN: [id number]  Today's Date: 2020  Onset of Illness/Injury or Date of Surgery: 20  Date of Referral to OT: 20  Referring Physician: BRITTNEE Coyle    Admit Date: 2020       ICD-10-CM ICD-9-CM   1. Acute ischemic stroke (CMS/HCC) I63.9 434.91   2. Dysarthria R47.1 784.51   3. Impaired mobility and ADLs Z74.09 V49.89    Z78.9    4. Oropharyngeal dysphagia R13.12 787.22   5. Cognitive communication deficit R41.841 799.52     Patient Active Problem List   Diagnosis   • H/O grade II follicular lymphoma    • Hypertension   • Dyslipidemia   • Hypothyroidism   • T2DM    • AIS    • Smoker     Past Medical History:   Diagnosis Date   • Dyslipidemia 2020   • Hypertension 2020   • Hypothyroidism 2020     Past Surgical History:   Procedure Laterality Date   • APPENDECTOMY     • CHOLECYSTECTOMY     • COLON SURGERY Right     R colon resection    • DEEP NECK LYMPH NODE BIOPSY / EXCISION     • HYSTERECTOMY     • INTERVENTIONAL RADIOLOGY PROCEDURE Bilateral 2020    Procedure: CAROTID CEREBRAL ANGIOGRAM BILATERAL;  Surgeon: Liam Villatoro MD;  Location: [facility] INVASIVE LOCATION;  Service: Interventional Radiology;  Laterality: Bilateral;   • PORTACATH PLACEMENT Left 2016    L subclavian        Therapy Treatment    Rehabilitation Treatment Summary     Row Name 20 1411 20 0920          Treatment Time/Intention    Discipline  occupational therapist  -HK  speech language pathologist  -     Document Type  therapy note (daily note)  -  therapy note (daily note)  -     Subjective Information  no complaints  -  no complaints  -     Mode of Treatment  occupational therapy  -  individual therapy;speech-language pathology  -CH     Patient/Family Observations  Daughter at bedside.   -  Dtr present  -     Care Plan Review  care  plan/treatment goals reviewed;risks/benefits reviewed;patient/other agree to care plan  -HK  --     Care Plan Review, Other Participant(s)  daughter  -HK  --     Therapy Frequency (Swallow)  --  5 days per week  -CH     Therapy Frequency (SLP SLC)  --  5 days per week  -CH     Patient Effort  excellent  -HK  excellent  -CH     Existing Precautions/Restrictions  fall;other (see comments) L hand weakness  -HK  fall;oxygen therapy device and L/min  -CH     Treatment Considerations/Comments  Pt with difficulty holding on to walker with L hand. Pt may benefit from platform walker.   -HK  --     Recorded by [HK] Barbie Levin, OT 08/04/20 1542 [CH] Brianna Smith MS CCC-SLP 08/04/20 1006     Row Name 08/04/20 1411             Vital Signs    Pre Systolic BP Rehab  -- RN cleared for tx; VSS; BP monitored throughout  -HK      Pre Patient Position  Sitting  -HK      Intra Patient Position  Standing  -HK      Post Patient Position  Sitting  -HK      Recorded by [HK] Barbie Levin, OT 08/04/20 1542      Row Name 08/04/20 1411             Cognitive Assessment/Intervention    Additional Documentation  Cognitive Assessment/Intervention (Group)  -HK      Recorded by [HK] Barbie Levin, OT 08/04/20 1542      Row Name 08/04/20 1411             Cognitive Assessment/Intervention- PT/OT    Affect/Mental Status (Cognitive)  WFL  -HK      Orientation Status (Cognition)  oriented x 4  -HK      Follows Commands (Cognition)  follows one step commands;over 90% accuracy  -HK      Cognitive Function (Cognitive)  safety deficit;executive function deficit  -HK      Executive Function Deficit (Cognition)  mild deficit  -HK      Safety Deficit (Cognitive)  mild deficit  -HK      Recorded by [HK] Barbie Levin, OT 08/04/20 1542      Row Name 08/04/20 1411             Safety Issues, Functional Mobility    Safety Issues Affecting Function (Mobility)  sequencing abilities;safety precautions follow-through/compliance;safety precaution  awareness;judgment;insight into deficits/self awareness;awareness of need for assistance;ability to follow commands  -HK      Impairments Affecting Function (Mobility)  balance;strength;range of motion (ROM);sensation/sensory awareness;endurance/activity tolerance;coordination;postural/trunk control  -HK      Recorded by [HK] Barbie Levin, OT 08/04/20 1542      Row Name 08/04/20 1411             Bed Mobility Assessment/Treatment    Comment (Bed Mobility)  Pt received and left UIC   -HK      Recorded by [HK] Barbie Levin, OT 08/04/20 1542      Row Name 08/04/20 1411             Functional Mobility    Functional Mobility- Ind. Level  moderate assist (50% patient effort);verbal cues required  -HK      Functional Mobility- Device  rolling walker  -HK      Functional Mobility-Distance (Feet)  15  -HK      Functional Mobility- Safety Issues  step length decreased;weight-shifting ability decreased;balance decreased during turns;sequencing ability decreased  -HK      Functional Mobility- Comment  Pt ambulated from chair to toilet with modAx1.  -HK      Recorded by [HK] Barbie Levin, OT 08/04/20 1542      Row Name 08/04/20 1411             Transfer Assessment/Treatment    Transfer Assessment/Treatment  sit-stand transfer;stand-sit transfer;toilet transfer  -HK      Comment (Transfers)  verbal cues for safe hand placement and sequencing. Pt with difficulty keeping L hand on RW. Pt transferred from toilet to reclining chair in bathroom.    -HK      Recorded by [HK] Barbie Levin, OT 08/04/20 1542      Row Name 08/04/20 1411             Sit-Stand Transfer    Sit-Stand [place] (Transfers)  minimum assist (75% patient effort);verbal cues  -HK      Assistive Device (Sit-Stand Transfers)  walker, front-wheeled  -HK      Recorded by [HK] Barbie Levin, OT 08/04/20 1542      Row Name 08/04/20 1411             Stand-Sit Transfer    Stand-Sit [place] (Transfers)  minimum assist (75% patient effort);verbal cues  -HK       Assistive Device (Stand-Sit Transfers)  walker, front-wheeled  -HK      Recorded by [HK] Barbie Levin, OT 08/04/20 1542      Row Name 08/04/20 1411             Toilet Transfer    Type (Toilet Transfer)  sit-stand;stand-sit  -HK      [place] Level (Toilet Transfer)  moderate assist (50% patient effort);1 person assist  -HK      Recorded by [] Barbie Levin, OT 08/04/20 1542      Row Name 08/04/20 1411             ADL Assessment/Intervention    BADL Assessment/Intervention  grooming;toileting  -HK      Recorded by [] Barbie Levin, OT 08/04/20 1542      Row Name 08/04/20 1411             Grooming Assessment/Training    [place] Level (Grooming)  hair care, combing/brushing;oral care regimen;wash face, hands;minimum assist (75% patient effort);verbal cues  -HK      Grooming Position  unsupported sitting  -HK      Comment (Grooming)  Pt sat unsupported in chair sink side to complete all grooming with Mariama to don toothpaste on toothbrush.   -HK      Recorded by [] Barbie Levin, OT 08/04/20 1542      Row Name 08/04/20 1411             Toileting Assessment/Training    [place] Level (Toileting)  perform perineal hygiene;minimum assist (75% patient effort);verbal cues  -HK      Toileting Position  unsupported sitting  -HK      Comment (Toileting)  Pt required Mariama to maintain balance while completing vel care.   -HK      Recorded by [] Barbie Levin, OT 08/04/20 1542      Row Name 08/04/20 1411             BADL Safety/Performance    Impairments, BADL Safety/Performance  balance;endurance/activity tolerance;strength;range of motion;trunk/postural control;motor control;motor planning;coordination  -HK      Skilled BADL Treatment/Intervention  BADL process/adaptation training  -HK      Recorded by [] Barbie Levin, OT 08/04/20 1542      Row Name 08/04/20 1411             Motor Skills Assessment/Interventions    Additional Documentation  Balance (Group);Therapeutic Exercise (Group);Therapeutic Exercise  Interventions (Group)  -HK      Recorded by [HK] Barbie Levin, OT 08/04/20 1542      Row Name 08/04/20 1411             Therapeutic Exercise    83144 - PT Therapeutic Exercise Minutes  --  -HK      14454 - OT Therapeutic Exercise Minutes  10  -HK      Recorded by [HK] Barbie Levin, OT 08/04/20 1544      Row Name 08/04/20 1411             Therapeutic Exercise    Upper Extremity Range of Motion (Therapeutic Exercise)  shoulder flexion/extension, left;elbow flexion/extension, left;wrist flexion/extension, left  -HK      Hand (Therapeutic Exercise)  finger flexion/extension, left  -HK      Exercise Type (Therapeutic Exercise)  AROM (active range of motion);AAROM (active assistive range of motion)  -HK      Position (Therapeutic Exercise)  seated  -HK      Sets/Reps (Therapeutic Exercise)  1/10  -HK      Comment (Therapeutic Exercise)  Pt and daughter educated on L UE HEP. OT issued pink foam block and issued hand out on  exercises.  -HK      Recorded by [HK] Barbie Levin, OT 08/04/20 1542      Row Name 08/04/20 1411             Balance    Balance  static sitting balance;static standing balance;dynamic sitting balance  -HK      Recorded by [HK] Barbie Levin, OT 08/04/20 1542      Row Name 08/04/20 1411             Static Sitting Balance    Level of [place] (Unsupported Sitting, Static Balance)  independent  -HK      Sitting Position (Unsupported Sitting, Static Balance)  sitting in chair  -HK      Time Able to Maintain Position (Unsupported Sitting, Static Balance)  2 to 3 minutes  -HK      Recorded by [HK] Barbie Levin, OT 08/04/20 1542      Row Name 08/04/20 1411             Dynamic Sitting Balance    Level of [place], Reaches Outside Midline (Sitting, Dynamic Balance)  supervision  -HK      Sitting Position, Reaches Outside Midline (Sitting, Dynamic Balance)  sitting in chair  -HK      Comment, Reaches Outside Midline (Sitting, Dynamic Balance)  completing all grooming sink side   -HK      Recorded  by [HK] Barbie Levin, OT 08/04/20 1542      Row Name 08/04/20 1411             Static Standing Balance    Level of [place] (Supported Standing, Static Balance)  minimal assist, 75% patient effort  -HK      Time Able to Maintain Position (Supported Standing, Static Balance)  1 to 2 minutes  -HK      Assistive Device Utilized (Supported Standing, Static Balance)  walker, rolling  -HK      Recorded by [HK] Barbie Levin, OT 08/04/20 1542      Row Name 08/04/20 1411             Positioning and Restraints    Pre-Treatment Position  sitting in chair/recliner  -HK      Post Treatment Position  chair  -HK      In Chair  notified nsg;reclined;call light within reach;encouraged to call for assist;exit alarm on;with family/caregiver  -HK      Recorded by [HK] Barbie Levin, OT 08/04/20 1542      Row Name 08/04/20 1411             Pain Assessment    Additional Documentation  Pain Scale: Numbers Pre/Post-Treatment (Group)  -HK      Recorded by [HK] Barbie Levin, OT 08/04/20 1542      Row Name 08/04/20 1411             Pain Scale: Numbers Pre/Post-Treatment    Pain Scale: Numbers, Pretreatment  0/10 - no pain  -HK      Pain Scale: Numbers, Post-Treatment  0/10 - no pain  -HK      Recorded by [HK] Barbie Levin, OT 08/04/20 1542      Row Name 08/04/20 1411             Sensory Assessment/Intervention    Sensory General Assessment  no sensation deficits identified  -HK      Recorded by [HK] Barbie Levin, OT 08/04/20 1542      Row Name                Wound 08/01/20 2000 Left labia Blisters    Wound - Properties Group Date first assessed: 08/01/20 [DS] Time first assessed: 2000 [DS2] Present on Hospital Admission: N [DS] Side: Left [DS] Location: labia [DS] Primary Wound Type: Blisters [DS], one fluid filled blister  Recorded by:  [DS] Alison Turcios RN 08/01/20 2136 [DS2] Alison Turcios RN 08/01/20 2138    Row Name                Wound 08/01/20 2000 Bilateral anterior thigh Pressure Injury    Wound - Properties Group Date  first assessed: 08/01/20 [DS] Time first assessed: 2000 [DS] Present on Hospital Admission: N [DS] Side: Bilateral [DS] Orientation: anterior [DS] Location: thigh [DS] Primary Wound Type: Pressure inj [DS] Stage, Pressure Injury: Stage 1 [DS] Recorded by:  [DS] Alison Turcios RN 08/01/20 2138    Row Name 08/04/20 1411             Coping    Observed Emotional State  accepting;calm;cooperative  -HK      Recorded by [HK] Barbie Levin, OT 08/04/20 1542      Row Name 08/04/20 1411             Plan of Care Review    Plan of Care Reviewed With  patient;daughter  -HK      Progress  improving  -HK      Recorded by [HK] Barbie Levin, OT 08/04/20 1542      Row Name 08/04/20 1411             Outcome Summary/Treatment Plan (OT)    Daily Summary of Progress (OT)  progress toward functional goals is good  -HK      Recorded by [HK] Barbie Levin, OT 08/04/20 1542      Row Name 08/04/20 0920             Outcome Summary/Treatment Plan (SLP)    Daily Summary of Progress (SLP)  progress toward functional goals as expected  -      Plan for Continued Treatment (SLP)  Continue to follow to address dysphagia, speech and cognition  -CH      Anticipated Dischage Disposition (SLP)  inpatient rehabilitation facility;anticipate therapy at next level of care  -      Recorded by [] Brianna Smith MS CCC-SLP 08/04/20 1006        User Key  (r) = Recorded By, (t) = Taken By, (c) = Cosigned By    Initials Name Effective Dates Discipline     Barbie Levin, OT 03/07/18 -  OT     Brianna Smith MS CCC-SLP 02/14/19 -  SLP    DS Alison Turcios RN 06/20/20 -  Nurse        Wound 08/01/20 2000 Left labia Blisters (Active)   Dressing Appearance open to air 8/3/2020  8:00 PM   Closure None 8/3/2020  8:00 PM   Base pink 8/3/2020  8:00 PM   Periwound intact 8/3/2020  8:00 PM   Drainage Amount scant 8/3/2020  8:00 PM   Care, Wound cleansed with;other (see comments) 8/3/2020  8:00 PM     Rehab Goal Summary     Row Name 08/04/20 0920              Swallow Goals (SLP)    Oral Nutrition/Hydration Goal Selection (SLP)  oral nutrition/hydration, SLP goal 1;oral nutrition/hydration, SLP goal 2  -CH      Labial Strengthening Goal Selection (SLP)  labial strengthening, SLP goal 1  -CH      Lingual Strengthening Goal Selection (SLP)  lingual strengthening, SLP goal 1  -CH      Pharyngeal Strengthening Exercise Goal Selection (SLP)  pharyngeal strengthening exercise, SLP goal 1  -CH      Swallow Management Recall Goal Selection (SLP)  swallow management recall, SLP goal 1  -CH      Swallow Compensatory Strategies Goal Selection (SLP)  swallow compensatory strategies, SLP goal 1  -CH      Additional Documentation  labial strengthening goal selection (SLP);lingual strengthening goal selection (SLP);pharyngeal strengthening exercise goal selection (SLP);swallow management recall goal selection (SLP);swallow compensatory strategies goal selection (SLP)  -CH         Oral Nutrition/Hydration Goal 1 (SLP)    Oral Nutrition/Hydration Goal 1, SLP  LTG: Tolerate soft diet with nectar-thick liquids without s/s aspiration with 100% accuracy and no cues  -CH      Time Frame (Oral Nutrition/Hydration Goal 1, SLP)  by discharge  -CH      Barriers (Oral Nutrition/Hydration Goal 1, SLP)  Patient is tolerating pureed and NT liquids with occ cues for lingual sweep, alt liquids and solids and small single bites/sips.  -CH      Progress/Outcomes (Oral Nutrition/Hydration Goal 1, SLP)  continuing progress toward goal  -CH         Oral Nutrition/Hydration Goal 2 (SLP)    Oral Nutrition/Hydration Goal 2, SLP  Tolerate ice chips and thin H2O without s/s aspiration (wet vocal quality, cough, often delayed) with 80% accuracy and no cues.  -CH      Time Frame (Oral Nutrition/Hydration Goal 2, SLP)  short term goal (STG)  -CH      Barriers (Oral Nutrition/Hydration Goal 2, SLP)  delayed cough with ice chips  -CH      Progress/Outcomes (Oral Nutrition/Hydration Goal 2, SLP)  continuing progress  toward goal  -CH         Labial Strengthening Goal 1 (SLP)    Activity (Labial Strengthening Goal 1, SLP)  increase labial tone  -CH      Increase Labial Tone  labial resistance exercises;swallow trials  -CH      [place]/Accuracy (Labial Strengthening Goal 1, SLP)  independently (over 90% accuracy)  -CH      Time Frame (Labial Strengthening Goal 1, SLP)  short term goal (STG)  -CH      Barriers (Labial Strengthening Goal 1, SLP)  Handout and explanation provided. Pt. completed with min cues.   -CH      Progress/Outcomes (Labial Strengthening Goal 1, SLP)  good progress toward goal  -CH         Lingual Strengthening Goal 1 (SLP)    Activity (Lingual Strengthening Goal 1, SLP)  increase lingual tone/sensation/control/coordination/movement;increase tongue back strength  -CH      Increase Lingual Tone/Sensation/Control/Coordination/Movement  swallow trials;lingual resistance exercises  -CH      Increase Tongue Back Strength  lingual resistance exercises  -CH      [place]/Accuracy (Lingual Strengthening Goal 1, SLP)  independently (over 90% accuracy)  -CH      Time Frame (Lingual Strengthening Goal 1, SLP)  short term goal (STG)  -CH      Barriers (Lingual Strengthening Goal 1, SLP)  Handout and explanation provided. Pt. completed with min cues.   -CH      Progress/Outcomes (Lingual Strengthening Goal 1, SLP)  good progress toward goal  -CH         Pharyngeal Strengthening Exercise Goal 1 (SLP)    Activity (Pharyngeal Strengthening Goal 1, SLP)  increase timing;increase anterior movement of the hyolaryngeal complex;increase squeeze/positive pressure generation  -CH      Increase Timing  prepping - 3 second prep or suck swallow or 3-step swallow;supraglottic swallow  -CH      Increase Anterior Movement of the Hyolaryngeal Complex  chin tuck against resistance (CTAR)  -CH      Increase Squeeze/Positive Pressure Generation  hard effortful swallow  -CH      [place]/Accuracy (Pharyngeal Strengthening Goal 1,  SLP)  independently (over 90% accuracy)  -CH      Time Frame (Pharyngeal Strengthening Goal 1, SLP)  short term goal (STG)  -CH      Barriers (Pharyngeal Strengthening Goal 1, SLP)  Handout and explanation provided. Pt. completed with min cues.   -CH      Progress/Outcomes (Pharyngeal Strengthening Goal 1, SLP)  good progress toward goal  -CH         Swallow Management Recall Goal 1 (SLP)    Activity (Swallow Management Recall Goal 1, SLP)  safe diet/liquid level;compensatory swallow strategies/techniques;rationale for use of strategies/techniques  -CH      [place]/Accuracy (Swallow Management Recall Goal 1, SLP)  independently (over 90% accuracy)  -CH      Time Frame (Swallow Management Recall Goal 1, SLP)  short term goal (STG)  -CH      Barriers (Swallow Management Recall Goal 1, SLP)  Patient and dtr able to restate. Dtr trained with use of thickener to thicken drinks to NT. Dr return demonstrated without difficulty.   -CH      Progress/Outcomes (Swallow Management Recall Goal 1, SLP)  good progress toward goal  -CH         Swallow Compensatory Strategies Goal 1 (SLP)    Activity (Swallow Compensatory Strategies/Techniques Goal 1, SLP)  compensatory strategies;during meal intake;during p.o. trials;small bites;small cup sips;food/liquid placed on stronger right side;alternate food/liquid intake  -CH      [place]/Accuracy (Swallow Compensatory Strategies/Techniques Goal 1, SLP)  independently (over 90% accuracy)  -CH      Time Frame (Swallow Compensatory Strategies/Techniques Goal 1, SLP)  short term goal (STG)  -CH      Barriers (Swallow Compensatory Strategies/Techniques Goal 1, SLP)  Pt and dtr able to return demonstrate after review.   -CH      Progress/Outcomes (Swallow Compensatory Strategies/Techniques Goal 1, SLP)  continuing progress toward goal  -CH         Communication Treatment Objective and Progress Goals (SLP)    Motor Speech/Dysarthria Treatment Objectives  Motor Speech/Dysarthria  Treatment Objectives (Group)  -CH      Cognitive Linguistic Treatment Objectives  Cognitive Linguistic Treatment Objectives (Group)  -      Additional Goals  Additional Goals (Group)  -         Motor Speech/Dysarthria Treatment Objectives    Articulation Selection  articulation, SLP goal 1  -CH         Articulation Goal 1 (SLP)    Improve Articulation Goal 1 (SLP)  by over-articulating at word level;80%;with minimal cues (75-90%)  -CH      Time Frame (Articulation Goal 1, SLP)  short term goal (STG)  -CH      Progress (Articulation Goal 1, SLP)  with moderate cues (50-74%);60%  -CH      Progress/Outcomes (Articulation Goal 1, SLP)  continuing progress toward goal  -CH         Cognitive Linguistic Treatment Objectives    Attention Selection  attention, SLP goal 1  -CH      Right Hemisphere Function Selection  right hemisphere function, SLP goal 1  -CH         Attention Goal 1 (SLP)    Improve Attention by Goal 1 (SLP)  complete sustained attention task;100%;with minimal cues (75-90%)  -CH      Time Frame (Attention Goal 1, SLP)  short term goal (STG)  -CH      Progress (Attention Goal 1, SLP)  90%;with minimal cues (75-90%)  -CH      Progress/Outcomes (Attention Goal 1, SLP)  good progress toward goal  -CH         Right Hemisphere Function Goal 1 (SLP)    Improve Right Hemisphere Function Through Goal 1 (SLP)  demonstrate awareness of communication partner in left visual field;use compensatory strategies for left neglect;identify physical/cognitive strengths and limitations;70%;with moderate cues (50-74%)  -CH      Time Frame (Right Hemisphere Function Goal 1, SLP)  short term goal (STG)  -CH      Progress (Right Hemisphere Function Goal 1, SLP)  90%;with minimal cues (75-90%)  -CH      Progress/Outcomes (Right Hemisphere Function Goal 1, SLP)  good progress toward goal  -CH         Additional Goals    Additional Goal Selection  additional goal, SLP goal 1  -CH         Additional Goal 1 (SLP)    Additional Goal  1, SLP  LTG: Improve cog-comm skills in order to participate in care while in hospital setting with 100% accuracy and min cues  -CH      Time Frame (Additional Goal 1, SLP)  by discharge  -CH      Progress/Outcomes (Additional Goal 1, SLP)  continuing progress toward goal  -CH        User Key  (r) = Recorded By, (t) = Taken By, (c) = Cosigned By    Initials Name Provider Type Discipline    CH Brianna Smith MS CCC-SLP Speech and Language Pathologist SLP        Occupational Therapy Education                 Title: PT OT SLP Therapies (In Progress)     Topic: Occupational Therapy (In Progress)     Point: ADL training (Done)     Description:   Instruct learner(s) on proper safety adaptation and remediation techniques during self care or transfers.   Instruct in proper use of assistive devices.              Learning Progress Summary           Patient Acceptance, E,TB,D, VU by  at 8/4/2020 1411    Comment:  Pt educated on ADL retraining with toileting and grooming, safety precautions, and appropriate body mechanics.   Family Acceptance, E,TB,D, VU by HK at 8/4/2020 1411    Comment:  Pt educated on ADL retraining with toileting and grooming, safety precautions, and appropriate body mechanics.                   Point: Home exercise program (In Progress)     Description:   Instruct learner(s) on appropriate technique for monitoring, assisting and/or progressing therapeutic exercises/activities.              Learning Progress Summary           Patient Acceptance, E, NR by  at 8/1/2020 0918    Comment:  Educated pt regarding role of therapy and ongoing treatment plan                   Point: Precautions (Done)     Description:   Instruct learner(s) on prescribed precautions during self-care and functional transfers.              Learning Progress Summary           Patient Acceptance, E,TB,D, VU by  at 8/4/2020 1411    Comment:  Pt educated on ADL retraining with toileting and grooming, safety precautions, and  appropriate body mechanics.   Family Acceptance, E,TB,D, VU by HK at 8/4/2020 1411    Comment:  Pt educated on ADL retraining with toileting and grooming, safety precautions, and appropriate body mechanics.                   Point: Body mechanics (Done)     Description:   Instruct learner(s) on proper positioning and spine alignment during self-care, functional mobility activities and/or exercises.              Learning Progress Summary           Patient Acceptance, E,TB,D, VU by HK at 8/4/2020 1411    Comment:  Pt educated on ADL retraining with toileting and grooming, safety precautions, and appropriate body mechanics.   Family Acceptance, E,TB,D, VU by HK at 8/4/2020 1411    Comment:  Pt educated on ADL retraining with toileting and grooming, safety precautions, and appropriate body mechanics.                               User Key     Initials Effective Dates Name Provider Type Discipline     06/22/15 -  Veronica Nur, OT Occupational Therapist OT     03/07/18 -  Barbie Levin, OT Occupational Therapist OT                OT Recommendation and Plan  Outcome Summary/Treatment Plan (OT)  Daily Summary of Progress (OT): progress toward functional goals is good  Daily Summary of Progress (OT): progress toward functional goals is good  Plan of Care Review  Plan of Care Reviewed With: patient, daughter  Plan of Care Reviewed With: patient, daughter  Outcome Summary: Pt with improvement in use of R UE this date. Pt completes sit to stand with Mariama and ambulates with modAx1 due to difficutly keeping L hand on RW. Pt completed vel care with Mariama to maintain balance and completed all grooming sitting in chair sink side with Mariama from OT. Pt may benefit from platform RW. Pt completed AROM/AAROM x10 reps shoulder/elbow/wrist/hand. OT issued pink foam block and educated pt/daughter on completion of  exercise. Continue to recommend d/c to [facility] when medically ready.  Outcome Measures     Row Name 08/04/20 1411              How much help from another is currently needed...    Putting on and taking off regular lower body clothing?  2  -HK      Bathing (including washing, rinsing, and drying)  2  -HK      Toileting (which includes using toilet bed pan or urinal)  3  -HK      Putting on and taking off regular upper body clothing  3  -HK      Taking care of personal grooming (such as brushing teeth)  3  -HK      Eating meals  3  -HK      AM-PAC 6 Clicks Score (OT)  16  -HK         Functional Assessment    Outcome Measure Options  AM-PAC 6 Clicks Daily Activity (OT)  -HK        User Key  (r) = Recorded By, (t) = Taken By, (c) = Cosigned By    Initials Name Provider Type    Barbie Nichols OT Occupational Therapist           Time Calculation:   Time Calculation- OT     Row Name 08/04/20 1411 08/04/20 1215          Time Calculation- OT    OT Start Time  1411  -HK  --     OT Received On  08/04/20  -  --        Timed Charges    72175 - OT Therapeutic Exercise Minutes  10  -HK  --     57077 - Gait Training Minutes   --  15  -KM     43550 - OT Self Care/Mgmt Minutes  39  -HK  --       User Key  (r) = Recorded By, (t) = Taken By, (c) = Cosigned By    Initials Name Provider Type     Yas Moran, PT Physical Therapist    HK Barbie Levin, OT Occupational Therapist        Therapy Charges for Today     Code Description Service Date Service Provider Modifiers Qty    71814049289  OT SELF CARE/MGMT/TRAIN EA 15 MIN 8/4/2020 Barbie Levin, OT GO 2    [id number]  OT THER PROC EA 15 MIN 8/4/2020 Barbie Levin OT GO 1               Barbie Levin OT  8/4/2020

## 2020-08-05 LAB
GLUCOSE BLDC GLUCOMTR-MCNC: 133 MG/DL (ref 70–130)
GLUCOSE BLDC GLUCOMTR-MCNC: 139 MG/DL (ref 70–130)
GLUCOSE BLDC GLUCOMTR-MCNC: 170 MG/DL (ref 70–130)
GLUCOSE BLDC GLUCOMTR-MCNC: 187 MG/DL (ref 70–130)

## 2020-08-05 PROCEDURE — 99231 SBSQ HOSP IP/OBS SF/LOW 25: CPT | Performed by: NURSE PRACTITIONER

## 2020-08-05 PROCEDURE — 97530 THERAPEUTIC ACTIVITIES: CPT

## 2020-08-05 PROCEDURE — 63710000001 INSULIN DETEMIR PER 5 UNITS: Performed by: INTERNAL MEDICINE

## 2020-08-05 PROCEDURE — 99232 SBSQ HOSP IP/OBS MODERATE 35: CPT | Performed by: INTERNAL MEDICINE

## 2020-08-05 PROCEDURE — 63710000001 INSULIN LISPRO (HUMAN) PER 5 UNITS: Performed by: INTERNAL MEDICINE

## 2020-08-05 PROCEDURE — 82962 GLUCOSE BLOOD TEST: CPT

## 2020-08-05 RX ORDER — LOSARTAN POTASSIUM 50 MG/1
100 TABLET ORAL
Status: DISCONTINUED | OUTPATIENT
Start: 2020-08-05 | End: 2020-08-06 | Stop reason: HOSPADM

## 2020-08-05 RX ORDER — METOPROLOL SUCCINATE 25 MG/1
25 TABLET, EXTENDED RELEASE ORAL
Status: DISCONTINUED | OUTPATIENT
Start: 2020-08-05 | End: 2020-08-06

## 2020-08-05 RX ORDER — LOSARTAN POTASSIUM 25 MG/1
25 TABLET ORAL ONCE
Status: COMPLETED | OUTPATIENT
Start: 2020-08-05 | End: 2020-08-05

## 2020-08-05 RX ADMIN — CLOPIDOGREL BISULFATE 75 MG: 75 TABLET ORAL at 09:33

## 2020-08-05 RX ADMIN — ASPIRIN 325 MG ORAL TABLET 325 MG: 325 PILL ORAL at 09:33

## 2020-08-05 RX ADMIN — METOPROLOL SUCCINATE 25 MG: 25 TABLET, EXTENDED RELEASE ORAL at 12:18

## 2020-08-05 RX ADMIN — SODIUM CHLORIDE, PRESERVATIVE FREE 10 ML: 5 INJECTION INTRAVENOUS at 09:34

## 2020-08-05 RX ADMIN — SODIUM CHLORIDE, PRESERVATIVE FREE 10 ML: 5 INJECTION INTRAVENOUS at 22:47

## 2020-08-05 RX ADMIN — ATORVASTATIN CALCIUM 80 MG: 40 TABLET, FILM COATED ORAL at 20:02

## 2020-08-05 RX ADMIN — INSULIN DETEMIR 10 UNITS: 100 INJECTION, SOLUTION SUBCUTANEOUS at 20:02

## 2020-08-05 RX ADMIN — LEVOTHYROXINE SODIUM 88 MCG: 88 TABLET ORAL at 05:57

## 2020-08-05 RX ADMIN — INSULIN LISPRO 2 UNITS: 100 INJECTION, SOLUTION INTRAVENOUS; SUBCUTANEOUS at 17:56

## 2020-08-05 RX ADMIN — LOSARTAN POTASSIUM 100 MG: 50 TABLET, FILM COATED ORAL at 09:33

## 2020-08-05 RX ADMIN — LOSARTAN POTASSIUM 25 MG: 25 TABLET, FILM COATED ORAL at 01:05

## 2020-08-05 NOTE — PLAN OF CARE
Problem: Patient Care Overview  Goal: Plan of Care Review  Flowsheets (Taken 8/5/2020 1520)  Outcome Summary: Pt is very motivated to work with OT; eager to go to rehab tomorrow. LUE AROM HEP completed 3x15 reps and L hand foam block HEP completed 3x5 reps to support function/ADLs. Set-up and cuing for simple grooming tasks via SROM R hand guiding L to complete. OT will continue to follow IP. Plan is IRF tomorrow.

## 2020-08-05 NOTE — PROGRESS NOTES
Spoke to Flower with [facility] who states physician is reviewing the record for acute rehab admission.  Flower has spoken to daughter, Gaby, per her request and answered her questions.

## 2020-08-05 NOTE — PLAN OF CARE
Problem: Patient Care Overview  Goal: Plan of Care Review  Outcome: Ongoing (interventions implemented as appropriate)  Flowsheets (Taken 8/5/2020 0017)  Progress: improving  Plan of Care Reviewed With: patient  Outcome Summary: VSS. AOx4. NIH 9. Pt ambulating well with walker to the bathroom. Very motivated to partake in activity and therapy activities. Looking forward to discharge to [facility] tomorrow. Sinus rhythm on the monitor.

## 2020-08-05 NOTE — PROGRESS NOTES
Spoke to Flower with [facility].  Patient has bed on the stroke unit tomorrow 8-6-20.  Transportation arranged with [facility] transport wheelchair van at 2:30.  Daughter notified.  Fax transfer summary to .  Nurse to call report to .

## 2020-08-05 NOTE — PROGRESS NOTES
Neurology Follow Up Note    HOD # (5)  CVA S/P thrombectomy for right M2 occlusion     Patient is sitting in bed talking to her daughter. She is more alert today. She is oriented X 4. Her daughter reports that she stayed awake the whole day yesterday with no daytime napping. After discussing her progress we have decided to hold on Provigil for alertness for now. Patient is excited to go to rehab. She reports that she is feeling better. Denies any new or worsening symptoms.          AIS     H/O grade II follicular lymphoma     Hypertension    Dyslipidemia    Hypothyroidism    T2DM     Smoker      Temp:  [98 °F (36.7 °C)-99.4 °F (37.4 °C)] 99.4 °F (37.4 °C)  Heart Rate:  [70-86] 70  Resp:  [16-20] 18  BP: (132-175)/(63-76) 175/69      Intake/Output Summary (Last 24 hours) at 8/5/2020 0929  Last data filed at 8/5/2020 0300  Gross per 24 hour   Intake --   Output 500 ml   Net -500 ml       Vital signs, labs, and pertinent tests were reviewed.    24 hour post-thrombectomy CT scan revealed evolving right frontoparietal infarct, negative for hemorrhage.    Echocardiogram: EF 55%, negative for left atrial enlargement and/or PFO. Patient remains in NSR - no documented atrial fibrillation. Will need an event monitor.     Total Cholesterol 135, LDL 52, HDL 57, A1c 7.3    Last Documented NIHSS  Interval: handoff  1a. Level of Consciousness: 0-->Alert, keenly responsive  1b. LOC Questions: 0-->Answers both questions correctly  1c. LOC Commands: 0-->Performs both tasks correctly     3. Visual: 1-->Partial hemianopia  4. Facial Palsy: 2-->Partial paralysis (total or near-total paralysis of lower face)  5a. Motor Arm, Left: 1-->Drift, limb holds 90 (or 45) degrees, but drifts down before full 10 seconds, does not hit bed or other support  5b. Motor Arm, Right: 0-->No drift, limb holds 90 (or 45) degrees for full 10 secs  6a. Motor Leg, Left: 1-->Drift, leg falls by the end of the 5-sec period but does not hit bed  6b. Motor Leg,  Right: 0-->No drift, leg holds 30 degree position for full 5 secs  7. Limb Ataxia: 1-->Present in one limb  8. Sensory: 0-->Normal, no sensory loss  9. Best Language: 0-->No aphasia, normal  10. Dysarthria: 1-->Mild-to-moderate dysarthria, patient slurs at least some words and, at worst, can be understood with some difficulty  11. Extinction and Inattention (formerly Neglect): 1-->Visual, tactile, auditory, spatial, or personal inattention or extinction to bilateral simultaneous stimulation in one of the sensory modalities    Total (NIH Stroke Scale): 9    EXAM  Constitutional: pleasant, , elderly woman   Pulm: effort normal  Cardio: rate normal  Skin: warm and dry  Psych: pleasant, cooperative. Not agitated.  HEENT: atraumatic  General: No acute distress, lethargic    Neurological Exam  Mental Status  Awake, alert and oriented to person, place and time.More alert than yesterday. Recent memory and remote memory intact. Mild dysarthria present. Language is fluent with no aphasia. Attention and concentration are normal.     Cranial Nerves  CN II: Visual fields full to confrontation.  CN III, IV, VI: Extraocular movements intact bilaterally.  CN V:  Right: Facial sensation is normal.  Left: Facial sensation is normal.   CN VII:  Right: There is no facial weakness.  Left: Left facial droop.  CN VIII: Hearing is normal.  CN XI: Shoulder shrug strength is normal.  CN XII: Tongue deviates to the left.     Motor   Strength is 5/5 in all four extremities except as noted. Left pronator drift.     Sensory  Light touch normal      Coordination  Right: Finger-to-nose normal.  Left: Finger-to-nose normal.     Gait  Not observed.    PLAN    1.  Right middle cerebral artery stroke.  Could be atheroembolic from her significant atherosclerotic disease extracranially, versus cardioembolic given her advancing age.  Will recommend cardiac monitor at discharge.  Echocardiogram is negative for left atrial enlargement and/or PFO.   24-hour CT head was negative for hemorrhage. Continue on Plavix 75 mg in addition to her ASA 325mg and lipitor 80mg for secondary stroke prevention.      2.  Essential hypertension.  Continue to normalize blood pressures with current target being SBP <150. Patient seems to be tolerating gradual decrease.      3.  Cerebral atherosclerosis.  Patient has significant atherosclerotic disease extracranially more than intracranially.  DAPT and high intensity statin.    4.  Diabetes mellitus type 2.  A1c 7.3. Recommend long term control for stroke prevention.  Management per hospitalist.      5.  Smoking.  Discussed smoking cessation being an important part of secondary stroke prevention.     6.  Activity.  Patient has been working with PT/OT. Plan for discharge to [facility]     7.  Diet. Pureed, nectar thick. Working with SLP    8. Somnolence. Continues to improve. No need for provigil at this time.     I have reviewed the patient's imaging and discussed the plan of care with the patients daughter who is currently at the bedside.  She is in agreement and all questions have been answered. Patient currently awaits placement inpatient rehab/[facility]. Stroke neurology will sign off.       BRITTNEE Tony Extender/Stroke Navigator

## 2020-08-05 NOTE — THERAPY TREATMENT NOTE
Acute Care - Occupational Therapy Treatment Note  [facility]     Patient Name: Marquez Myrick  : 1940  MRN: [id number]  Today's Date: 2020  Onset of Illness/Injury or Date of Surgery: 20  Date of Referral to OT: 20  Referring Physician: BRITTNEE Coyle    Admit Date: 2020       ICD-10-CM ICD-9-CM   1. Acute ischemic stroke (CMS/[facility]) I63.9 434.91   2. Dysarthria R47.1 784.51   3. Impaired mobility and ADLs Z74.09 V49.89    Z78.9    4. Oropharyngeal dysphagia R13.12 787.22   5. Cognitive communication deficit R41.841 799.52     Patient Active Problem List   Diagnosis   • H/O grade II follicular lymphoma    • Hypertension   • Dyslipidemia   • Hypothyroidism   • T2DM    • AIS    • Smoker     Past Medical History:   Diagnosis Date   • Dyslipidemia 2020   • Hypertension 2020   • Hypothyroidism 2020     Past Surgical History:   Procedure Laterality Date   • APPENDECTOMY     • CHOLECYSTECTOMY     • COLON SURGERY Right     R colon resection    • DEEP NECK LYMPH NODE BIOPSY / EXCISION     • HYSTERECTOMY     • INTERVENTIONAL RADIOLOGY PROCEDURE Bilateral 2020    Procedure: CAROTID CEREBRAL ANGIOGRAM BILATERAL;  Surgeon: Liam Villatoro MD;  Location: [facility] INVASIVE LOCATION;  Service: Interventional Radiology;  Laterality: Bilateral;   • PORTACATH PLACEMENT Left     L subclavian        Therapy Treatment    Rehabilitation Treatment Summary     Row Name 20 1520             Treatment Time/Intention    Discipline  occupational therapist  -TB      Document Type  therapy note (daily note)  -TB      Subjective Information  complains of;weakness  -TB      Mode of Treatment  individual therapy  -TB      Patient/Family Observations  No family present  -TB      Care Plan Review  care plan/treatment goals reviewed;patient/other agree to care plan  -TB      Patient Effort  excellent  -TB      Existing Precautions/Restrictions  fall;other (see comments)  L UE/hand weakness  -TB      Patient Response to Treatment  Motivated to regain functional independence  -TB      Recorded by [TB] Tracie Mayer, OT 08/05/20 1551      Row Name 08/05/20 1520             Vital Signs    Pre Systolic BP Rehab  -- RN cleared OT  -TB      O2 Delivery Post Treatment  room air  -TB      Pre Patient Position  Supine  -TB      Intra Patient Position  Sitting fowlers  -TB      Post Patient Position  Sitting  -TB      Recorded by [TB] Tracie Mayer, OT 08/05/20 1551      Row Name 08/05/20 1520             Cognitive Assessment/Intervention- PT/OT    Orientation Status (Cognition)  oriented x 4  -TB      Follows Commands (Cognition)  over 90% accuracy  -TB      Executive Function Deficit (Cognition)  mild deficit  -TB      Safety Deficit (Cognitive)  mild deficit  -TB      Recorded by [TB] Tracie Mayer, OT 08/05/20 1551      Row Name 08/05/20 1520             Bed Mobility Assessment/Treatment    Bed Mobility Assessment/Treatment  rolling left;rolling right;scooting/bridging  -TB      Rolling Left [place] (Bed Mobility)  moderate assist (50% patient effort);verbal cues  -TB      Rolling Right [place] (Bed Mobility)  minimum assist (75% patient effort);verbal cues  -TB      Scooting/Bridging [place] (Bed Mobility)  moderate assist (50% patient effort);verbal cues  -TB      Assistive Device (Bed Mobility)  draw sheet  -TB      Comment (Bed Mobility)  Repositioning in bed to come up to high fowlers  -TB      Recorded by [TB] Tracie Mayer, OT 08/05/20 1551      Row Name 08/05/20 1520             Functional Mobility    Functional Mobility- Comment  Defer to PT  -TB      Recorded by [TB] Tracie Mayer OT 08/05/20 1551      Row Name 08/05/20 1520             ADL Assessment/Intervention    BADL Assessment/Intervention  grooming  -TB      Recorded by [TB] Tracie Mayer OT 08/05/20 1551      Row Name 08/05/20 1520             Grooming  Assessment/Training    [place] Level (Grooming)  wash face, hands;set up  -TB      Comment (Grooming)  Pt brings B hands together cooperatively at midline to complete SROM for grooming task  -TB      Recorded by [TB] Tracie Mayer, OT 08/05/20 1551      Row Name 08/05/20 1520             BADL Safety/Performance    Impairments, BADL Safety/Performance  balance;endurance/activity tolerance;trunk/postural control;strength;range of motion;motor planning;coordination  -TB      Skilled BADL Treatment/Intervention  BADL process/adaptation training  -TB      Recorded by [TB] Tracie Mayer, OT 08/05/20 1551      Row Name 08/05/20 1520             Therapeutic Exercise    Therapeutic Exercise  seated, upper extremities  -TB      Additional Documentation  Therapeutic Exercise (Row)  -TB      00305 - OT Therapeutic Activity Minutes  15  -TB      Recorded by [TB] Tracie Mayer, OT 08/05/20 1551      Row Name 08/05/20 1520             Upper Extremity Seated Therapeutic Exercise    Performed, Seated Upper Extremity (Therapeutic Exercise)  shoulder flexion/extension;shoulder abduction/adduction;shoulder horizontal abduction/adduction;elbow flexion/extension;forearm supination/pronation;wrist flexion/extension;digit flexion/extension  -TB      Exercise Type, Seated Upper Extremity (Therapeutic Exercise)  AROM (active range of motion);AAROM (active assistive range of motion);resistive exercise  -TB      Expected Outcomes, Seated Upper Extremity (Therapeutic Exercise)  improve performance, BADLs;strengthen normal movement patterns;strengthen, facilitate independent active range of motion  -TB      Restrictions, Seated Upper Extremity (Therapeutic Exercise)  L hemiparesis  -TB      Sets/Reps Detail, Seated Upper Extremity (Therapeutic Exercise)  3x10  -TB      Comment, Seated Upper Extremity (Therapeutic Exercise)  Education reinforced for LUE AROM HEP and introduced L  strength foam block HEP (red);  excellent effort, follow up training needed  -TB      Recorded by [TB] Tracie Mayer, OT 08/05/20 1551      Row Name 08/05/20 1520             Gross Motor Coordination    Gross Motor Skill, Impairments Detail  Pt able to visually attend to task with Min cues, reach and touch moving target 5/5 trials using L UE/hand from seated position   -TB      Recorded by [TB] Tracie Mayer, OT 08/05/20 1551      Row Name 08/05/20 1520             Positioning and Restraints    Pre-Treatment Position  in bed  -TB      Post Treatment Position  bed  -TB      In Bed  fowlers;call light within reach;encouraged to call for assist;exit alarm on  -TB      Recorded by [TB] Tracie Mayer, OT 08/05/20 1551      Row Name 08/05/20 1520             Pain Scale: Numbers Pre/Post-Treatment    Pain Scale: Numbers, Pretreatment  0/10 - no pain  -TB      Pain Scale: Numbers, Post-Treatment  0/10 - no pain  -TB      Pre/Post Treatment Pain Comment  Pt denies pain with activity  -TB      Pain Intervention(s)  -- Therapeutic exercise/ADL retraining  -TB      Recorded by [TB] Tracie Mayer, OT 08/05/20 1551      Row Name                Wound 08/01/20 2000 Left labia Blisters    Wound - Properties Group Date first assessed: 08/01/20 [DS] Time first assessed: 2000 [DS2] Present on Hospital Admission: N [DS] Side: Left [DS] Location: labia [DS] Primary Wound Type: Blisters [DS], one fluid filled blister  Recorded by:  [DS] Alison Turcios RN 08/01/20 2136 [DS2] Alison Turcios RN 08/01/20 2138    Row Name                Wound 08/01/20 2000 Bilateral anterior thigh Pressure Injury    Wound - Properties Group Date first assessed: 08/01/20 [DS] Time first assessed: 2000 [DS] Present on Hospital Admission: N [DS] Side: Bilateral [DS] Orientation: anterior [DS] Location: thigh [DS] Primary Wound Type: Pressure inj [DS] Stage, Pressure Injury: Stage 1 [DS] Recorded by:  [DS] Alison Turcios RN 08/01/20 2138    Row Name  08/05/20 1520             Coping    Observed Emotional State  cooperative;hopeful  -TB      Verbalized Emotional State  hopefulness  -TB      Recorded by [TB] Tracie Mayer, OT 08/05/20 1551      Row Name 08/05/20 1520             Plan of Care Review    Plan of Care Reviewed With  patient  -TB      Recorded by [TB] Tracie Mayer, OT 08/05/20 1551      Row Name 08/05/20 1520             Outcome Summary/Treatment Plan (OT)    Daily Summary of Progress (OT)  progress toward functional goals as expected  -TB      Anticipated Discharge Disposition (OT)  inpatient rehabilitation facility  -TB      Recorded by [TB] Tracie Mayer, OT 08/05/20 1551        User Key  (r) = Recorded By, (t) = Taken By, (c) = Cosigned By    Initials Name Effective Dates Discipline    TB Tracie Mayer, OT 06/08/18 -  OT    Alison Fraser RN 06/20/20 -  Nurse        Wound 08/01/20 2000 Left labia Blisters (Active)   Dressing Appearance open to air 8/5/2020  8:00 AM   Base closed/resurfaced;pink 8/5/2020  8:00 AM   Drainage Amount none 8/5/2020  8:00 AM   Periwound Care, Wound dry periwound area maintained 8/4/2020  8:00 PM       Wound 08/01/20 2000 Bilateral anterior thigh Pressure Injury (Active)   Dressing Appearance open to air 8/5/2020  8:00 AM   Base pink 8/5/2020  8:00 AM   Drainage Amount none 8/5/2020  8:00 AM       Occupational Therapy Education                 Title: PT OT SLP Therapies (Done)     Topic: Occupational Therapy (Done)     Point: ADL training (Done)     Description:   Instruct learner(s) on proper safety adaptation and remediation techniques during self care or transfers.   Instruct in proper use of assistive devices.              Learning Progress Summary           Patient Acceptance, E,D,TB, VU,DU,NR by TB at 8/5/2020 1552    Comment:  Education reinforced for benefits of activity/therapy, role of OT and LUE/hand HEPs to support function/ADLs    Acceptance, E,TB,D, VU by  at  8/4/2020 1411    Comment:  Pt educated on ADL retraining with toileting and grooming, safety precautions, and appropriate body mechanics.   Family Acceptance, E,TB,D, VU by HK at 8/4/2020 1411    Comment:  Pt educated on ADL retraining with toileting and grooming, safety precautions, and appropriate body mechanics.                   Point: Home exercise program (Done)     Description:   Instruct learner(s) on appropriate technique for monitoring, assisting and/or progressing therapeutic exercises/activities.              Learning Progress Summary           Patient Acceptance, E,D,TB, VU,DU,NR by TB at 8/5/2020 1552    Comment:  Education reinforced for benefits of activity/therapy, role of OT and LUE/hand HEPs to support function/ADLs    Acceptance, E, NR by JR at 8/1/2020 0918    Comment:  Educated pt regarding role of therapy and ongoing treatment plan                   Point: Precautions (Done)     Description:   Instruct learner(s) on prescribed precautions during self-care and functional transfers.              Learning Progress Summary           Patient Acceptance, E,TB,D, VU by  at 8/4/2020 1411    Comment:  Pt educated on ADL retraining with toileting and grooming, safety precautions, and appropriate body mechanics.   Family Acceptance, E,TB,D, VU by HK at 8/4/2020 1411    Comment:  Pt educated on ADL retraining with toileting and grooming, safety precautions, and appropriate body mechanics.                   Point: Body mechanics (Done)     Description:   Instruct learner(s) on proper positioning and spine alignment during self-care, functional mobility activities and/or exercises.              Learning Progress Summary           Patient Acceptance, E,TB,D, VU by HK at 8/4/2020 1411    Comment:  Pt educated on ADL retraining with toileting and grooming, safety precautions, and appropriate body mechanics.   Family Acceptance, E,TB,D, VU by HK at 8/4/2020 1411    Comment:  Pt educated on ADL retraining with  toileting and grooming, safety precautions, and appropriate body mechanics.                               User Key     Initials Effective Dates Name Provider Type Discipline     06/08/18 -  Tracie Mayer, OT Occupational Therapist OT    JR 06/22/15 -  Veronica Nur, OT Occupational Therapist OT     03/07/18 -  Barbie Levin, OT Occupational Therapist OT                OT Recommendation and Plan  Outcome Summary/Treatment Plan (OT)  Daily Summary of Progress (OT): progress toward functional goals as expected  Anticipated Discharge Disposition (OT): inpatient rehabilitation facility  Daily Summary of Progress (OT): progress toward functional goals as expected  Plan of Care Review  Plan of Care Reviewed With: patient  Plan of Care Reviewed With: patient  Outcome Summary: Pt is very motivated to work with OT; eager to go to rehab tomorrow. LUE AROM HEP completed 3x15 reps and L hand foam block HEP completed 3x5 reps to support function/ADLs. Set-up and cuing for simple grooming tasks via SROM R hand guiding L to complete. OT will continue to follow IP. Plan is IRF tomorrow.  Outcome Measures     Row Name 08/05/20 1520 08/04/20 1411          How much help from another is currently needed...    Putting on and taking off regular lower body clothing?  2  -TB  2  -HK     Bathing (including washing, rinsing, and drying)  2  -TB  2  -HK     Toileting (which includes using toilet bed pan or urinal)  3  -TB  3  -HK     Putting on and taking off regular upper body clothing  3  -TB  3  -HK     Taking care of personal grooming (such as brushing teeth)  3  -TB  3  -HK     Eating meals  3  -TB  3  -HK     AM-PAC 6 Clicks Score (OT)  16  -TB  16  -HK        Modified [place] Scale    Modified [place] Scale  4 - Moderately severe disability.  Unable to walk without assistance, and unable to attend to own bodily needs without assistance.  -TB  --        Functional Assessment    Outcome Measure Options  AM-PAC 6 Clicks  Daily Activity (OT)  -TB  AM-PAC 6 Clicks Daily Activity (OT)  -       User Key  (r) = Recorded By, (t) = Taken By, (c) = Cosigned By    Initials Name Provider Type    Tracie Mena OT Occupational Therapist    HK Barbie Levin OT Occupational Therapist           Time Calculation:   Time Calculation- OT     Row Name 08/05/20 1556 08/05/20 1520          Time Calculation- OT    OT Start Time  1520  -TB  --     OT Received On  08/05/20  -TB  --     OT Goal Re-Cert Due Date  08/11/20  -TB  --        Timed Charges    40810 - OT Therapeutic Activity Minutes  --  15  -TB       User Key  (r) = Recorded By, (t) = Taken By, (c) = Cosigned By    Initials Name Provider Type    Tracie Mena OT Occupational Therapist        Therapy Charges for Today     Code Description Service Date Service Provider Modifiers Qty    [id number] HC OT THERAPEUTIC ACT EA 15 MIN 8/5/2020 Tracie Mayer OT GO 1               Tracie Mayer OT  8/5/2020

## 2020-08-05 NOTE — PROGRESS NOTES
Spoke to Flower at [facility].  Will be able to accept patient to [facility] tomorrow, 8-6-18.  Daughter to transport.  Daughter, Gaby, notified of planned transfer to [facility] tomorrow.

## 2020-08-05 NOTE — PROGRESS NOTES
[facility] Medicine Services  PROGRESS NOTE    Patient Name: Marquez Myrick  : 1940  MRN: [id number]    Date of Admission: 2020  Primary Care Physician: Provider, No Known    Subjective   Subjective     CC: f/u CVA    HPI: Up in bed eating. Overall she thinks she is getting stronger. She has no new complaints.     Review of Systems  Gen- No fevers, chills  CV- No chest pain, palpitations  Resp- No cough, dyspnea  GI- No N/V/D, abd pain    Objective   Objective     Vital Signs:   Temp:  [98 °F (36.7 °C)-99.4 °F (37.4 °C)] 98.9 °F (37.2 °C)  Heart Rate:  [69-79] 69  Resp:  [16-20] 18  BP: (132-175)/(67-79) 159/79  Total (NIH Stroke Scale): 9     Physical Exam:  Constitutional: No acute distress, awake, alert  HENT: NCAT, mucous membranes moist  Respiratory: Clear to auscultation bilaterally, respiratory effort normal   Cardiovascular: RRR, no murmurs, rubs, or gallops, palpable pedal pulses bilaterally  Gastrointestinal: Positive bowel sounds, soft, nontender, nondistended  Musculoskeletal: No bilateral ankle edema  Psychiatric: Appropriate affect, cooperative  Neurologic: Oriented x 3, left sided weakness.  Skin: No rashes    Results Reviewed:  Results from last 7 days   Lab Units 20  0632 20  0742 20  0530 20  0522   WBC 10*3/mm3 14.10* 17.40*  --  11.93*   HEMOGLOBIN g/dL 11.3* 12.8  --  13.8   HEMOGLOBIN, POC g/dL  --   --  15.6  --    HEMATOCRIT % 36.4 41.3  --  42.0   HEMATOCRIT POC %  --   --  46  --    PLATELETS 10*3/mm3 163 197  --  234   INR   --   --   --  0.9     Results from last 7 days   Lab Units 20  0632 20  0604 20  0610  20  0522   SODIUM mmol/L 143 143 142   < >  --    POTASSIUM mmol/L 3.9 3.9 4.2   < >  --    CHLORIDE mmol/L 108* 109* 106   < >  --    CO2 mmol/L 23.0 22.0 20.0*   < >  --    BUN mg/dL 33* 32* 32*   < >  --    CREATININE mg/dL 1.40* 1.45* 1.44*   < >  --    GLUCOSE mg/dL 121* 187* 233*   < >  --     CALCIUM mg/dL 9.0 9.2 9.4   < >  --    ALT (SGPT) U/L  --   --   --   --  13   AST (SGOT) U/L  --   --   --   --  19   TROPONIN T ng/mL  --   --   --   --  <0.010    < > = values in this interval not displayed.     Estimated Creatinine Clearance: 35 mL/min (A) (by C-G formula based on SCr of 1.4 mg/dL (H)).    Microbiology Results Abnormal     Procedure Component Value - Date/Time    COVID-19,CEPHEID,ARLEEN IN-HOUSE(OR EMERGENT/ADD-ON),NP SWAB IN TRANSPORT MEDIA 3-4 HR TAT - Swab, Nasopharynx [[id number]]  (Normal) Collected:  07/31/20 0543    Lab Status:  Final result Specimen:  Swab from Nasopharynx Updated:  07/31/20 0654     COVID19 Not Detected    Narrative:       Fact sheet for providers: https://www.fda.gov/media/103904/download     Fact sheet for patients: https://www.fda.gov/media/095054/download        Personally reviewed MRI brain showing right sided CVA. Agree with interpretation.    Results for orders placed during the hospital encounter of 07/31/20   Adult Transthoracic Echo Complete W/ Cont if Necessary Per Protocol (With Agitated Saline)    Narrative · Estimated EF = 55%.  · Left ventricular systolic function is normal.  · Trace to mild mitral regurgitation  · Trace tricuspid regurgitation          I have reviewed the medications:  Scheduled Meds:  aspirin 325 mg Oral Daily   Or      aspirin 300 mg Rectal Daily   atorvastatin 80 mg Oral Nightly   clopidogrel 75 mg Oral Daily   insulin detemir 10 Units Subcutaneous Nightly   insulin lispro 0-9 Units Subcutaneous TID AC   levothyroxine 88 mcg Oral QAM   losartan 100 mg Oral Q24H   metoprolol succinate XL 25 mg Oral Q24H   sodium chloride 10 mL Intravenous Q12H     Continuous Infusions:   PRN Meds:.heparin  •  ondansetron  •  sodium chloride  •  sodium chloride  •  sodium chloride  •  sodium chloride    Assessment/Plan   Assessment & Plan     Active Hospital Problems    Diagnosis  POA   • **AIS  [I63.9]  Yes   • H/O grade II follicular lymphoma  [C82.90]   Yes   • Hypertension [I10]  Yes   • Dyslipidemia [E78.5]  Yes   • Hypothyroidism [E03.9]  Yes   • T2DM  [E11.9]  Yes   • Smoker [F17.200]  Yes      Resolved Hospital Problems   No resolved problems to display.        Brief Hospital Course to date:  Marquez Myrick is a 80 y.o. female with PMH significant for follicular lymphoma, HTN, HLD, hypothyroidism, T2DM, ongoing smoking.  The patient presented to [facility] on 7/31/2020 with right MCA stroke and underwent thrombectomy by Dr. Pugh.  This is my first day assessing patient's active medical issues.    --Neuro follows. Continue ASA, plavix  and statin. Recs cardiac monitor at d/c.  --HTN remains uncontroleld. Increased losartan to 100 mg. Resume home metoprolol.  --Speech reccs noted. Modified diet.  --Glucose controlled today. Continue current basal/bolus insulin  --Encourage smoking cessation  --PT/OT/CM - Plan is to go to [facility] at discharge tomorrow. Covid negative.     DVT Prophylaxis:  Mechanical  Disposition: I expect the patient to be discharged to [facility] tomorrow.    CODE STATUS:   Code Status and Medical Interventions:   Ordered at: 07/31/20 0803     Code Status:    CPR     Medical Interventions (Level of Support Prior to Arrest):    Full         Electronically signed by Shantell Holman II, DO, 08/05/20, 13:51.

## 2020-08-05 NOTE — PROGRESS NOTES
Continued Stay Note  [facility]     Patient Name: Marquez Myrick  MRN: [id number]  Today's Date: 8/5/2020    Admit Date: 7/31/2020    Discharge Plan     Row Name 08/05/20 1216       Plan    Plan  Rehab at [facility]    Patient/Family in Agreement with Plan  yes    Plan Comments  Met with patient and daughter, GabyDorene  Flower from [facility] in the room.  Flower answered questions from patient and daughter.  Awaiting physician acceptance at [facility] for acute rehab.  Daughter plans to transport in private vehicle.  Following.     Final Discharge Disposition Code  62 - inpatient rehab facility        Discharge Codes    No documentation.       Expected Discharge Date and Time     Expected Discharge Date Expected Discharge Time    Aug 6, 2020             Trish Rutledge RN

## 2020-08-06 VITALS
WEIGHT: 173.2 LBS | SYSTOLIC BLOOD PRESSURE: 167 MMHG | HEART RATE: 65 BPM | BODY MASS INDEX: 27.83 KG/M2 | RESPIRATION RATE: 18 BRPM | TEMPERATURE: 98.1 F | OXYGEN SATURATION: 94 % | HEIGHT: 66 IN | DIASTOLIC BLOOD PRESSURE: 72 MMHG

## 2020-08-06 DIAGNOSIS — I69.398 OTHER SEQUELAE OF CEREBRAL INFARCTION: ICD-10-CM

## 2020-08-06 DIAGNOSIS — I63.9 ACUTE ISCHEMIC STROKE (HCC): ICD-10-CM

## 2020-08-06 DIAGNOSIS — R42 DIZZINESS AND GIDDINESS: ICD-10-CM

## 2020-08-06 DIAGNOSIS — R47.1 DYSARTHRIA: Primary | ICD-10-CM

## 2020-08-06 LAB
GLUCOSE BLDC GLUCOMTR-MCNC: 106 MG/DL (ref 70–130)
GLUCOSE BLDC GLUCOMTR-MCNC: 122 MG/DL (ref 70–130)

## 2020-08-06 PROCEDURE — 82962 GLUCOSE BLOOD TEST: CPT

## 2020-08-06 PROCEDURE — 99239 HOSP IP/OBS DSCHRG MGMT >30: CPT | Performed by: INTERNAL MEDICINE

## 2020-08-06 RX ORDER — NIFEDIPINE 60 MG/1
60 TABLET, EXTENDED RELEASE ORAL
Status: DISCONTINUED | OUTPATIENT
Start: 2020-08-06 | End: 2020-08-06 | Stop reason: HOSPADM

## 2020-08-06 RX ORDER — ATORVASTATIN CALCIUM 80 MG/1
80 TABLET, FILM COATED ORAL
Start: 2020-08-06

## 2020-08-06 RX ORDER — CLOPIDOGREL BISULFATE 75 MG/1
75 TABLET ORAL DAILY
Qty: 30 TABLET
Start: 2020-08-07

## 2020-08-06 RX ORDER — METOPROLOL SUCCINATE 50 MG/1
50 TABLET, EXTENDED RELEASE ORAL
Start: 2020-08-06

## 2020-08-06 RX ORDER — ASPIRIN 325 MG
325 TABLET ORAL DAILY
Start: 2020-08-07

## 2020-08-06 RX ORDER — METOPROLOL SUCCINATE 50 MG/1
50 TABLET, EXTENDED RELEASE ORAL
Status: DISCONTINUED | OUTPATIENT
Start: 2020-08-07 | End: 2020-08-06 | Stop reason: HOSPADM

## 2020-08-06 RX ADMIN — CLOPIDOGREL BISULFATE 75 MG: 75 TABLET ORAL at 08:40

## 2020-08-06 RX ADMIN — METOPROLOL SUCCINATE 25 MG: 25 TABLET, EXTENDED RELEASE ORAL at 08:40

## 2020-08-06 RX ADMIN — LEVOTHYROXINE SODIUM 88 MCG: 88 TABLET ORAL at 05:00

## 2020-08-06 RX ADMIN — NIFEDIPINE 60 MG: 60 TABLET, FILM COATED, EXTENDED RELEASE ORAL at 12:48

## 2020-08-06 RX ADMIN — SODIUM CHLORIDE, PRESERVATIVE FREE 10 ML: 5 INJECTION INTRAVENOUS at 08:40

## 2020-08-06 RX ADMIN — ASPIRIN 325 MG ORAL TABLET 325 MG: 325 PILL ORAL at 08:40

## 2020-08-06 RX ADMIN — LOSARTAN POTASSIUM 100 MG: 50 TABLET, FILM COATED ORAL at 08:40

## 2020-08-06 NOTE — PROGRESS NOTES
Continued Stay Note  [facility]     Patient Name: Marquez Myrick  MRN: [id number]  Today's Date: 8/6/2020    Admit Date: 7/31/2020    Discharge Plan     Row Name 08/06/20 1734       Plan    Plan  Rehab at [facility]     Patient/Family in Agreement with Plan  yes    Plan Comments  Daughter in the room with patient.  Ready for transfer to [facility] today.  Transport per [facility] transport van at 2:30.  Fax and phone numbers provided for report.  No additonal needs.     Final Discharge Disposition Code  62 - inpatient rehab facility        Discharge Codes    No documentation.       Expected Discharge Date and Time     Expected Discharge Date Expected Discharge Time    Aug 6, 2020             Trish Rutledge RN

## 2020-08-06 NOTE — DISCHARGE SUMMARY
"    [facility] Medicine Services  DISCHARGE SUMMARY    Patient Name: Marquez Myrick  : 1940  MRN: [id number]    Date of Admission: 2020  5:13 AM  Date of Discharge: 2020  Primary Care Physician: Provider, No Known    Consults     Date and Time Order Name Status Description    2020 0514 Inpatient Neurology Consult Stroke Completed           Hospital Course     Presenting Problem:   Acute ischemic stroke (CMS/HCC) [I63.9]    Active Hospital Problems    Diagnosis  POA   • H/O grade II follicular lymphoma  [C82.90]  Yes   • Hypertension [I10]  Yes   • Dyslipidemia [E78.5]  Yes   • Hypothyroidism [E03.9]  Yes   • T2DM  [E11.9]  Yes   • Smoker [F17.200]  Yes      Resolved Hospital Problems    Diagnosis Date Resolved POA   • **AIS  [I63.9] 2020 Yes          Hospital Course:  Marquez Myrick is a 80 y.o. female with PMH significant for follicular lymphoma, HTN, HLD, hypothyroidism, T2DM, ongoing smoking.  The patient presented to [facility] on 2020 with right MCA stroke and underwent thrombectomy by Dr. Pugh. She did well post procedurally and was transferred to [facility] on 8/3 and did well. Neurology followed throughout her stay. They recommended asa 325 mg, plavix, statin as well as outpt cardiac monitor. Her blood pressure continued to be elevated but was much better on day of d/c. This can continue to be addressed during her stay at [facility].     Discharge Follow Up Recommendations for outpatient labs/diagnostics:   F/U PCP upon return to [place]   Establish with Stroke Neurologist upon return to [place]    Day of Discharge     HPI: Up in bed with daughter in room. Has no complaints. Says she is ready to \"get to work.\"    Review of Systems  Gen- No fevers, chills  CV- No chest pain, palpitations  Resp- No cough, dyspnea  GI- No N/V/D, abd pain    Vital Signs:   Temp:  [97.9 °F (36.6 °C)-98.9 °F (37.2 °C)] 98.1 °F (36.7 °C)  Heart Rate:  [62-82] 62  Resp:  [16-18] " 18  BP: (156-178)/(64-79) 167/72     Physical Exam:  Constitutional: No acute distress, awake, alert  HENT: NCAT, mucous membranes moist  Respiratory: Clear to auscultation bilaterally, respiratory effort normal   Cardiovascular: RRR, no murmurs, rubs, or gallops, palpable pedal pulses bilaterally  Gastrointestinal: Positive bowel sounds, soft, nontender, nondistended  Musculoskeletal: No bilateral ankle edema  Psychiatric: Appropriate affect, cooperative  Neurologic: Oriented x 3, dysarthria, left facial droop, left hemiparesis  Skin: No rashes    Pertinent  and/or Most Recent Results     Results from last 7 days   Lab Units 08/04/20  0632 08/03/20  0604 08/02/20  0610 08/01/20  0742 07/31/20  0530 07/31/20  0523 07/31/20  0522   WBC 10*3/mm3 14.10*  --   --  17.40*  --   --  11.93*   HEMOGLOBIN g/dL 11.3*  --   --  12.8  --   --  13.8   HEMOGLOBIN, POC g/dL  --   --   --   --  15.6  --   --    HEMATOCRIT % 36.4  --   --  41.3  --   --  42.0   HEMATOCRIT POC %  --   --   --   --  46  --   --    PLATELETS 10*3/mm3 163  --   --  197  --   --  234   SODIUM mmol/L 143 143 142 140  --   --   --    POTASSIUM mmol/L 3.9 3.9 4.2 4.5  --   --   --    CHLORIDE mmol/L 108* 109* 106 101  --   --   --    CO2 mmol/L 23.0 22.0 20.0* 20.0*  --   --   --    BUN mg/dL 33* 32* 32* 26*  --   --   --    CREATININE mg/dL 1.40* 1.45* 1.44* 1.81*  --  1.30  --    GLUCOSE mg/dL 121* 187* 233* 257*  --   --   --    CALCIUM mg/dL 9.0 9.2 9.4 9.7  --   --   --      Results from last 7 days   Lab Units 07/31/20  0522 07/31/20  0521   ALT (SGPT) U/L 13  --    AST (SGOT) U/L 19  --    PROTIME seconds 11.2*  --    INR  0.9  --    APTT seconds  --  30.8     Results from last 7 days   Lab Units 08/01/20  0742   CHOLESTEROL mg/dL 135   TRIGLYCERIDES mg/dL 128   HDL CHOL mg/dL 57     Results from last 7 days   Lab Units 08/01/20  0742 07/31/20  0522   TSH uIU/mL 4.040  --    HEMOGLOBIN A1C % 7.30*  --    TROPONIN T ng/mL  --  <0.010       Brief Urine  Lab Results     None          Microbiology Results Abnormal     Procedure Component Value - Date/Time    COVID-19,CEPHEID,ARLEEN IN-HOUSE(OR EMERGENT/ADD-ON),NP SWAB IN TRANSPORT MEDIA 3-4 HR TAT - Swab, Nasopharynx [[id number]]  (Normal) Collected:  07/31/20 0543    Lab Status:  Final result Specimen:  Swab from Nasopharynx Updated:  07/31/20 0654     COVID19 Not Detected    Narrative:       Fact sheet for providers: https://www.fda.gov/media/209912/download     Fact sheet for patients: https://www.fda.gov/media/066470/download          Imaging Results (All)     Procedure Component Value Units Date/Time    CT Head Without Contrast [[id number]] Collected:  08/03/20 1120     Updated:  08/03/20 1245    Narrative:       EXAMINATION: CT HEAD WO CONTRAST-08/03/2020:      INDICATION: Stroke; I63.9-Cerebral infarction, unspecified;  R47.1-Dysarthria and anarthria; Z74.09-Other reduced mobility;  Z78.9-Other specified health status; R13.12-Dysphagia, oropharyngeal  phase; R41.841-Cognitive communication deficit, followup stroke.     TECHNIQUE: Multiple axial CT imaging was obtained of the head from the  skull base to the skull vertex without the administration of intravenous  contrast.     The radiation dose reduction device was turned on for each scan per the  ALARA (As Low as Reasonably Achievable) protocol.     COMPARISON: NONE.     FINDINGS: Continued evolution identified and low density within the  right temporoparietal lobe. No hemorrhagic conversion. No significant  surrounding edema or mass effect. No abnormal extra-axial fluid  collection identified. The bony structures are unremarkable. The  visualized paranasal sinuses are clear.       Impression:       Evolving right temporoparietal area of infarction. No  significant change in the surrounding edema or mass effect.     D:  08/03/2020  E:  08/03/2020           This report was finalized on 8/3/2020 12:42 PM by Dr. Joan Quinones MD.       CT Head Without Contrast  [[id number]] Collected:  08/01/20 0824     Updated:  08/03/20 1233    Narrative:       EXAMINATION: CT HEAD WO CONTRAST - 08/01/2020     INDICATION:  I63.9-Cerebral infarction, unspecified; R47.1-Dysarthria  and anarthria. Stroke symptoms, follow-up CVA.     TECHNIQUE: Multiple axial CT imaging is obtained of the head from skull  base to skull vertex without the administration of intravenous contrast.     The radiation dose reduction device was turned on for each scan per the  ALARA (As Low as Reasonably Achievable) protocol.     COMPARISON: 07/31/2020     FINDINGS: There is an area of low density identified within the right  temporoparietal region suggesting patient's evolving area of infarction.  There is minimal surrounding edema and mass effect. No hemorrhage. No  midline shift. No effacement of the ventricular system. Bony structures  reveal no evidence of osseous abnormality. The visualized paranasal  sinuses are clear. The mastoid air cells are patent.       Impression:       Area of evolving infarction in the right temporoparietal  region. No significant effacement of the ventricular system with minimal  edema and mass effect. No midline shift. No hemorrhagic or conversion.     DICTATED:   08/01/2020  EDITED/ls :   08/01/2020         This report was finalized on 8/3/2020 12:30 PM by Dr. Joan Quinones MD.       MRI Brain Without Contrast [[id number]] Collected:  08/01/20 0831     Updated:  08/02/20 1400    Narrative:       EXAMINATION: MRI BRAIN WO CONTRAST - 08/01/2020     INDICATION:  I63.9-Cerebral infarction, unspecified; R47.1-Dysarthria  and anarthria. Left lower facial droop. Evaluate for stroke.     TECHNIQUE: Routine multiplanar imaging is obtained of the brain without  the administration of gadolinium contrast.     COMPARISON: NONE     FINDINGS: There is a moderate sized area of abnormal signal seen in the  right temporoparietal region. Findings to suggest an area of acute  ischemia. No evidence  of hemorrhage. Some mild chronic small vessel  ischemic changes seen within the brain. Ventricular system is  unremarkable. Globes and orbits are intact. No evidence of  hydrocephalus. No mass, mass effect, or midline shift. No abnormal  extra-axial fluid collections identified. Pituitary and sellar  unremarkable. Craniovertebral junction is preserved. No cerebellar  pontine angle mass is identified. Visualized paranasal sinuses are  clear.       Impression:       Moderate size acute ischemic insult in the right temporal  parietal region. Minimal surrounding edema with no significant mass  effect or effacement of the ventricular system. No hemorrhage.     DICTATED:   08/01/2020  EDITED/ls :   08/01/2020          This report was finalized on 8/2/2020 1:57 PM by Dr. Joan Quinones MD.       SLP FEES - Fiberoptic Endo Eval Swallow [[id number]] Resulted:  08/02/20 1018     Updated:  08/02/20 1018    Narrative:       This procedure was auto-finalized with no dictation required.    CT Angiogram Head [[id number]] Collected:  07/31/20 0913     Updated:  08/01/20 1837    Narrative:       EXAMINATION: CT ANGIOGRAM HEAD- 07/31/2020     INDICATION: Stroke; I63.9-Cerebral infarction, unspecified;  R47.1-Dysarthria and anarthria     TECHNIQUE: Pre and postcontrast 3 mm and 0.75 mm axial images through  the brain with 2-D and 3-D VRT and MIP angiographic reconstructions.     The radiation dose reduction device was turned on for each scan per the  ALARA (As Low as Reasonably Achievable) protocol.     COMPARISON: Angiographic head CT scan of same date, 5:19 AM     FINDINGS: Current study is timed at 9:04 AM. Perhaps best appreciated on  thin section axial image #220 series 7 is restored flow in the anterior  right M2 branch, compared to the very subtle cut off seen on previous  image 338 series 7. There is again mild decrease in caliber of the  distal right M1 segment, less than 30% narrowing, without focal  stenosis. Remaining  anterior and middle cerebral arteries and proximal  branches appear grossly normal. Distal vertebral arteries, basilar  artery, and posterior cerebral arteries appear grossly normal. Posterior  cerebral arteries are primarily supplied via large posterior  communicating arteries. There is a relatively small basilar artery as a  result. No new intracranial vascular stenosis is identified. Axial  source images again show subtle hypoenhancement of the posterior right  frontal lobe, axial 3 mm image 91 series 902, consistent with an  approximately 4 cm area of mild edema/ischemia.       Impression:       1. Apparent restoration of flow to previously truncated right anterior  temporal branch.  2. Mild narrowing of the distal right M1 branch unchanged.  3. Subtle approximately 4 cm area of hypoenhancement in the posterior  right frontal lobe corresponding to perfusion scan abnormality.     D:  07/31/2020  E:  07/31/2020     This report was finalized on 8/1/2020 6:34 PM by Dr. Jass Milan MD.       CT Head Without Contrast [[id number]] Collected:  07/31/20 0904     Updated:  07/31/20 2356    Narrative:       EXAMINATION: CT HEAD WO CONTRAST- 07/31/2020     INDICATION: Stroke; I63.9-Cerebral infarction, unspecified;  R47.1-Dysarthria and anarthria     TECHNIQUE: 5 mm unenhanced images through the brain     The radiation dose reduction device was turned on for each scan per the  ALARA (As Low as Reasonably Achievable) protocol.     COMPARISON: NONE     FINDINGS: The calvarium appears intact. Included paranasal sinuses and  mastoids appear clear. Soft tissue  images show residual intravascular  contrast from patient's previous angiographic studies earlier this  morning. Allowing for this, there is no evidence of hemorrhage. There is  no evidence of mass mass effect, or well-defined area of cerebral edema.  There is a suggestion of mild loss of gray/white matter differentiation  in the superior left temporal lobe including the  upper left insular  region, axial images 17, 18 and 19 of initial study, image 18 and 19 of  the reconstructed study. There is trace marginal postcontrast  enhancement along the dorsal margin of this region, which may represent  mild capillary leakage, rather than petechial hemorrhage. No potential  edema/infarct is seen elsewhere.       Impression:       Subtle new area of edema in the superior right temporal lobe  and inferior right frontal lobe, corresponding to patient's perfusion  scan abnormality. Trace marginal postcontrast enhancement along the  dorsal margin of this region, or less likely mild petechial hemorrhage.  No new intracranial disease is seen elsewhere.     D:  07/31/2020  E:  07/31/2020     This report was finalized on 7/31/2020 11:53 PM by Dr. Jass Milan MD.       XR Chest 1 View [[id number]] Collected:  07/31/20 0638     Updated:  07/31/20 0640    Narrative:       CR Chest 1 Vw    INDICATION:   Stroke patient. Stroke symptoms this morning. Patient fell. Altered mental status/dysarthria     COMPARISON:    None available.    FINDINGS:  Single portable AP view(s) of the chest.  There is a left-sided tunneled catheter terminating in mid SVC level. There is mild cardiac silhouette enlargement. There is ectasia of the descending thoracic aorta with vascular calcifications. Surgical clips  right supraclavicular region. Evidence for old granulomatous disease. Minimal atelectasis left base. No acute congestive failure pleural effusion or pneumothorax.      Impression:         1. Mild cardiac silhouette enlargement and minimal left base atelectasis. No acute congestive failure.    Signer Name: Jessy Menon MD   Signed: 7/31/2020 6:38 AM   Workstation Name: [email or web address]    [facility]    CT Angiogram Neck [[id number]] Collected:  07/31/20 0606     Updated:  07/31/20 0608    Narrative:       See accompanying CTA head. CTA of the head and neck are dictated together on that  report.    Signer Name: Jessy Menon MD   Signed: 7/31/2020 6:06 AM   Workstation Name: [email or web address]    [facility]    CT Angiogram Head [[id number]] Collected:  07/31/20 0605     Updated:  07/31/20 0607    Narrative:       CTA Head    INDICATION:   Left-sided facial weakness and droop. Last known well around 1:00 AM.    TECHNIQUE:   CT angiogram of the head and neck with IV contrast. Contrast dose recorded in the patient's chart. 3-D reconstructions were obtained and reviewed.  Evaluation for a significant carotid arterial stenosis is based on the NASCET criteria. Radiation dose  reduction techniques included automated exposure control or exposure modulation based on body size. Count of known CT and cardiac nuc med studies performed in previous 12 months: 2.     COMPARISON:   Earlier head CT and CT perfusion    FINDINGS:   CTA neck:  There are atherosclerotic vascular cut locations of the aortic arch including great vessel origins. There is likely mild narrowing at the origin of the innominate and left common carotid artery.    Right carotid system: There is atherosclerotic plaque at the right carotid bifurcation. There is about 30% stenosis by NASCET criteria there is moderate atherosclerotic plaque at the right carotid siphon but it is patent.    Left carotid system: There is calcified plaque at the left carotid bifurcation. By NASCET criteria there is about 20% diameter stenosis. There is mild plaque at the left carotid siphon. It is widely patent.    There is calcified plaque at the origin of the right vertebral artery with mild to moderate stenosis. Both vertebral arteries are patent and codominant. Both supply the basilar.        CTA head:  There is a mild narrowing of the distal right M1 vessel and there is vascular cut off proximal right anterior  M2 vessel with subsequent distal reconstitution. There is at least a 5 mm in length filling defect. This is highly concerning for  a  thromboembolus responsible for the abnormalities seen on the perfusion CT. There is no significant sized anterior communicating artery. No additional intracranial vascular cut off is suspected. Some distal vascular irregularity is likely. This could be a  manifestation of intracranial atherosclerotic disease. There are bilateral posterior communicating arteries with essentially fetal origin bilateral posterior cerebral artery distribution. The dural venous sinuses are patent. The P1 vessels are  hypoplastic left more so than right.    There is dental disease. There are cervical spine degenerative changes.      Impression:         1. There is a cutoff in the proximal right M2 vessel most likely due to thromboembolic disease and consistent with the area of ischemia are seen on the earlier perfusion CT. Finding called by myself to Dr. Forman at 6:00 AM. Patient is already in the  Cath Lab.  2. By NASCET criteria there is about 30% diameter stenosis at the right carotid bifurcation and 20% diameter stenosis on the left carotid bifurcation.  3. Extensive atherosclerotic vascular calcifications as above. Suspect some intracranial atherosclerotic disease also.  4. No definite anterior communicating artery. There are bilateral posterior to indicating arteries with largely fetal origin posterior cerebral artery distributions. The P1 vessels are hypoplastic.    Signer Name: Jessy Menon MD   Signed: 7/31/2020 6:05 AM   Workstation Name: [email or web address]    [facility]    CT Cerebral Perfusion With & Without Contrast [[id number]] Collected:  07/31/20 0535     Updated:  07/31/20 0537    Narrative:       CT CEREBRAL PERFUSION W WO CONTRAST    HISTORY:   Left-sided weakness and facial drooping for less than 6 hours.    TECHNIQUE:   Axial CT images of the brain without and with intravenous contrast using cerebral perfusion protocol. Post-processing parametric maps were created using RAPID software and reviewed.  Radiation dose reduction techniques included automated exposure control  or exposure modulation based on body size. CT and nuclear cardiology exams in the last 12 months: 0.    COMPARISON:   Head CT same day    FINDINGS:   Arterial input function is optimal.     PERFUSION PARAMETERS:  *  Ischemic tissue volume (Tmax > 6 sec.): 34 mL.  *  Infarct core volume (CBF < 30%): 5 mL.  *  Mismatch (penumbra) volume: 29 mL.  *  Mismatch ratio: 6.8.  *  Location/territory: Right frontal lobe.    COLLATERAL CIRCULATION PARAMETERS:  *  Volume poor collateral perfusion (Tmax > 10 sec.): 10 mL.  *  Hypoperfusion index (Tmax >10 sec./Tmax > 6 sec.): 0.3.  *  CBV Index (rCBV in Tmax > 6 sec. region): 0.7.      Impression:       Ischemic tissue volume of 34 mL in the right frontal lobe with an infarct core volume of 5 mL. Consider MRI for follow-up.      Signer Name: Umesh Babb MD   Signed: 7/31/2020 5:35 AM   Workstation Name: [email or web address]    [facility]    CT Head Without Contrast Stroke Protocol [[id number]] Collected:  07/31/20 0530     Updated:  07/31/20 0532    Narrative:       CT Head WO Code Stroke    HISTORY:   Left-sided weakness and facial drooping starting at 1:00 AM.    TECHNIQUE:   Axial unenhanced head CT with multiplanar reformats. Radiation dose reduction techniques included automated exposure control or exposure modulation based on body size. Count of known CT and cardiac nuc med studies performed in previous 12 months: 0.     COMPARISON:   None.    FINDINGS:   Ventricular size and configuration are normal. No acute infarct or hemorrhage is identified. There are no masses. There is no skull fracture.  There is mild atrophy with chronic small vessel ischemic disease in the white matter. Atherosclerotic  calcifications are present in the carotid siphons.      Impression:       Senescent changes without acute abnormality.    The examination was performed at 0519 hours and results were called  by telephone at 7/31/2020 5:28 AM to Robbie CT technologist who verbally acknowledged these results.        Signer Name: Umesh Babb MD   Signed: 7/31/2020 5:30 AM   Workstation Name: [email or web address]    [facility]                    Results for orders placed during the hospital encounter of 07/31/20   Adult Transthoracic Echo Complete W/ Cont if Necessary Per Protocol (With Agitated Saline)    Narrative · Estimated EF = 55%.  · Left ventricular systolic function is normal.  · Trace to mild mitral regurgitation  · Trace tricuspid regurgitation          Plan for Follow-up of Pending Labs/Results: None    Discharge Details        Discharge Medications      New Medications      Instructions Start Date   aspirin 325 MG tablet  Replaces:  aspirin 81 MG chewable tablet   325 mg, Oral, Daily   Start Date:  August 7, 2020     clopidogrel 75 MG tablet  Commonly known as:  PLAVIX   75 mg, Oral, Daily   Start Date:  August 7, 2020        Changes to Medications      Instructions Start Date   atorvastatin 80 MG tablet  Commonly known as:  LIPITOR  What changed:    · medication strength  · how much to take   80 mg, Oral, Every Night at Bedtime      metoprolol succinate XL 50 MG 24 hr tablet  Commonly known as:  TOPROL-XL  What changed:    · medication strength  · how much to take   50 mg, Oral, Every Night at Bedtime         Continue These Medications      Instructions Start Date   gabapentin 300 MG capsule  Commonly known as:  NEURONTIN   300 mg, Oral, 2 times daily      insulin lispro protamine-insulin lispro (75-25) 100 UNIT/ML suspension injection  Commonly known as:  humaLOG 75-25   22 Units, Subcutaneous, 2 Times Daily With Meals      levothyroxine 88 MCG tablet  Commonly known as:  SYNTHROID, LEVOTHROID   88 mcg, Oral, Every Morning      loratadine 10 MG tablet  Commonly known as:  CLARITIN   10 mg, Oral, Every Morning      losartan 100 MG tablet  Commonly known as:  COZAAR   100 mg, Oral, Every  Morning      NIFEdipine XL 60 MG 24 hr tablet  Commonly known as:  PROCARDIA XL   60 mg, Oral, Every Morning      spironolactone 25 MG tablet  Commonly known as:  ALDACTONE   25 mg, Oral, 2 Times Daily         Stop These Medications    aspirin 81 MG chewable tablet  Replaced by:  aspirin 325 MG tablet     omeprazole 20 MG capsule  Commonly known as:  priLOSEC            Allergies   Allergen Reactions   • Darvon [Propoxyphene] GI Intolerance   • Lortab [Hydrocodone-Acetaminophen] GI Intolerance         Discharge Disposition:  Home or Self Care    Diet:  Hospital:  Diet Order   Procedures   • Diet Dysphagia; II - Pureed; Nectar / Syrup Thick; No Straws, Extra Sauce / Gravy; Consistent Carbohydrate            CODE STATUS:    Code Status and Medical Interventions:   Ordered at: 07/31/20 0803     Code Status:    CPR     Medical Interventions (Level of Support Prior to Arrest):    Full       No future appointments.              Electronically signed by Shantell Holman II, DO, 08/06/20, 11:06 AM.      Time Spent on Discharge:  I spent  36  minutes on this discharge activity which included: face-to-face encounter with the patient, reviewing the data in the system, coordination of the care with the nursing staff as well as consultants, documentation, and entering orders.

## 2020-08-06 NOTE — CONSULTS
81 yo female with hx of Type 2 DM. Current A1C is 7.3%. Patient had been independent in her self care prior to this hospitalization. She lives alone in [place], and was here visiting with family when episode happened. Being discharged today to [facility]. No concerns noted at this time. Encouraged all follow up appointments after discharge. Thank you for this referral.

## 2020-08-06 NOTE — DISCHARGE PLACEMENT REQUEST
"Stephanie Myrickelianaluisana (80 y.o. Female)     Date of Birth Social Security Number Address Home Phone MRN    1940  [address]  [facility] [address] [phone] [id number]    Alevism Marital Status          Sabianist        Admission Date Admission Type Admitting Provider Attending Provider Department, Room/Bed    7/31/20 Emergency Shantell Holman II, Shantell Raymond II, DO [facility] 3E, S349/1    Discharge Date Discharge Disposition Discharge Destination         Home or Self Care              Attending Provider:  Shantell Holman II, DO    Allergies:  Darvon [Propoxyphene], Lortab [Hydrocodone-acetaminophen]    Isolation:  None   Infection:  None   Code Status:  CPR    Ht:  167.6 cm (65.98\")   Wt:  78.6 kg (173 lb 3.2 oz)    Admission Cmt:  None   Principal Problem:  AIS  [I63.9]                 Active Insurance as of 7/31/2020     Primary Coverage     Payor Plan Insurance Group Employer/Plan Group    MEDICARE MEDICARE A & B      Payor Plan Address Payor Plan Phone Number Payor Plan Fax Number Effective Dates    [address] [phone]  7/1/2007 - None Entered    [facility] [id number]       Subscriber Name Subscriber Birth Date Member ID       BLANCHE MYRICK 1940 [id number]           Secondary Coverage     Payor Plan Insurance Group Employer/Plan Group    ANTHEM BLUE CROSS ANTHEM BLUE CROSS BLUE SHIELD PPO [id number]     Payor Plan Address Payor Plan Phone Number Payor Plan Fax Number Effective Dates    [address] [phone]  1/1/2020 - None Entered    [facility] [id number]       Subscriber Name Subscriber Birth Date Member ID       BLANCHE MYRICK 1940 [id number]                 Emergency Contacts      (Rel.) Home Phone Work Phone Mobile Phone    Ranjan Myrick (Power of ) [phone] -- --    Irena Myrick (Relative) [phone] -- --    PinaGaby (Daughter) [phone] -- --               Discharge Summary      Shantell Holman II, DO at 08/06/20 " "1016              [facility] Medicine Services  DISCHARGE SUMMARY    Patient Name: Marquez Myrick  : 1940  MRN: [id number]    Date of Admission: 2020  5:13 AM  Date of Discharge: 2020  Primary Care Physician: Provider, No Known    Consults     Date and Time Order Name Status Description    2020 0514 Inpatient Neurology Consult Stroke Completed           Hospital Course     Presenting Problem:   Acute ischemic stroke (CMS/HCC) [I63.9]    Active Hospital Problems    Diagnosis  POA   • H/O grade II follicular lymphoma  [C82.90]  Yes   • Hypertension [I10]  Yes   • Dyslipidemia [E78.5]  Yes   • Hypothyroidism [E03.9]  Yes   • T2DM  [E11.9]  Yes   • Smoker [F17.200]  Yes      Resolved Hospital Problems    Diagnosis Date Resolved POA   • **AIS  [I63.9] 2020 Yes          Hospital Course:  Marquez Myrick is a 80 y.o. female with PMH significant for follicular lymphoma, HTN, HLD, hypothyroidism, T2DM, ongoing smoking.  The patient presented to [facility] on 2020 with right MCA stroke and underwent thrombectomy by Dr. Pugh. She did well post procedurally and was transferred to [facility] on 8/3 and did well. Neurology followed throughout her stay. They recommended asa 325 mg, plavix, statin as well as outpt cardiac monitor. Her blood pressure continued to be elevated but was much better on day of d/c. This can continue to be addressed during her stay at [facility].     Discharge Follow Up Recommendations for outpatient labs/diagnostics:   F/U PCP upon return to [place]   Establish with Stroke Neurologist upon return to [place]    Day of Discharge     HPI: Up in bed with daughter in room. Has no complaints. Says she is ready to \"get to work.\"    Review of Systems  Gen- No fevers, chills  CV- No chest pain, palpitations  Resp- No cough, dyspnea  GI- No N/V/D, abd pain    Vital Signs:   Temp:  [97.9 °F (36.6 °C)-98.9 °F (37.2 °C)] 98.1 °F (36.7 °C)  Heart Rate:  [62-82] " 62  Resp:  [16-18] 18  BP: (156-178)/(64-79) 167/72     Physical Exam:  Constitutional: No acute distress, awake, alert  HENT: NCAT, mucous membranes moist  Respiratory: Clear to auscultation bilaterally, respiratory effort normal   Cardiovascular: RRR, no murmurs, rubs, or gallops, palpable pedal pulses bilaterally  Gastrointestinal: Positive bowel sounds, soft, nontender, nondistended  Musculoskeletal: No bilateral ankle edema  Psychiatric: Appropriate affect, cooperative  Neurologic: Oriented x 3, dysarthria, left facial droop, left hemiparesis  Skin: No rashes    Pertinent  and/or Most Recent Results     Results from last 7 days   Lab Units 08/04/20  0632 08/03/20  0604 08/02/20  0610 08/01/20  0742 07/31/20  0530 07/31/20  0523 07/31/20  0522   WBC 10*3/mm3 14.10*  --   --  17.40*  --   --  11.93*   HEMOGLOBIN g/dL 11.3*  --   --  12.8  --   --  13.8   HEMOGLOBIN, POC g/dL  --   --   --   --  15.6  --   --    HEMATOCRIT % 36.4  --   --  41.3  --   --  42.0   HEMATOCRIT POC %  --   --   --   --  46  --   --    PLATELETS 10*3/mm3 163  --   --  197  --   --  234   SODIUM mmol/L 143 143 142 140  --   --   --    POTASSIUM mmol/L 3.9 3.9 4.2 4.5  --   --   --    CHLORIDE mmol/L 108* 109* 106 101  --   --   --    CO2 mmol/L 23.0 22.0 20.0* 20.0*  --   --   --    BUN mg/dL 33* 32* 32* 26*  --   --   --    CREATININE mg/dL 1.40* 1.45* 1.44* 1.81*  --  1.30  --    GLUCOSE mg/dL 121* 187* 233* 257*  --   --   --    CALCIUM mg/dL 9.0 9.2 9.4 9.7  --   --   --      Results from last 7 days   Lab Units 07/31/20  0522 07/31/20  0521   ALT (SGPT) U/L 13  --    AST (SGOT) U/L 19  --    PROTIME seconds 11.2*  --    INR  0.9  --    APTT seconds  --  30.8     Results from last 7 days   Lab Units 08/01/20  0742   CHOLESTEROL mg/dL 135   TRIGLYCERIDES mg/dL 128   HDL CHOL mg/dL 57     Results from last 7 days   Lab Units 08/01/20  0742 07/31/20  0522   TSH uIU/mL 4.040  --    HEMOGLOBIN A1C % 7.30*  --    TROPONIN T ng/mL  --   <0.010       Brief Urine Lab Results     None          Microbiology Results Abnormal     Procedure Component Value - Date/Time    COVID-19,CEPHEID,ARLEEN IN-HOUSE(OR EMERGENT/ADD-ON),NP SWAB IN TRANSPORT MEDIA 3-4 HR TAT - Swab, Nasopharynx [[id number]]  (Normal) Collected:  07/31/20 0543    Lab Status:  Final result Specimen:  Swab from Nasopharynx Updated:  07/31/20 0654     COVID19 Not Detected    Narrative:       Fact sheet for providers: https://www.fda.gov/media/254852/download     Fact sheet for patients: https://www.fda.gov/media/247041/download          Imaging Results (All)     Procedure Component Value Units Date/Time    CT Head Without Contrast [[id number]] Collected:  08/03/20 1120     Updated:  08/03/20 1245    Narrative:       EXAMINATION: CT HEAD WO CONTRAST-08/03/2020:      INDICATION: Stroke; I63.9-Cerebral infarction, unspecified;  R47.1-Dysarthria and anarthria; Z74.09-Other reduced mobility;  Z78.9-Other specified health status; R13.12-Dysphagia, oropharyngeal  phase; R41.841-Cognitive communication deficit, followup stroke.     TECHNIQUE: Multiple axial CT imaging was obtained of the head from the  skull base to the skull vertex without the administration of intravenous  contrast.     The radiation dose reduction device was turned on for each scan per the  ALARA (As Low as Reasonably Achievable) protocol.     COMPARISON: NONE.     FINDINGS: Continued evolution identified and low density within the  right temporoparietal lobe. No hemorrhagic conversion. No significant  surrounding edema or mass effect. No abnormal extra-axial fluid  collection identified. The bony structures are unremarkable. The  visualized paranasal sinuses are clear.       Impression:       Evolving right temporoparietal area of infarction. No  significant change in the surrounding edema or mass effect.     D:  08/03/2020  E:  08/03/2020           This report was finalized on 8/3/2020 12:42 PM by Dr. Joan Quinones MD.        CT Head Without Contrast [[id number]] Collected:  08/01/20 0824     Updated:  08/03/20 1233    Narrative:       EXAMINATION: CT HEAD WO CONTRAST - 08/01/2020     INDICATION:  I63.9-Cerebral infarction, unspecified; R47.1-Dysarthria  and anarthria. Stroke symptoms, follow-up CVA.     TECHNIQUE: Multiple axial CT imaging is obtained of the head from skull  base to skull vertex without the administration of intravenous contrast.     The radiation dose reduction device was turned on for each scan per the  ALARA (As Low as Reasonably Achievable) protocol.     COMPARISON: 07/31/2020     FINDINGS: There is an area of low density identified within the right  temporoparietal region suggesting patient's evolving area of infarction.  There is minimal surrounding edema and mass effect. No hemorrhage. No  midline shift. No effacement of the ventricular system. Bony structures  reveal no evidence of osseous abnormality. The visualized paranasal  sinuses are clear. The mastoid air cells are patent.       Impression:       Area of evolving infarction in the right temporoparietal  region. No significant effacement of the ventricular system with minimal  edema and mass effect. No midline shift. No hemorrhagic or conversion.     DICTATED:   08/01/2020  EDITED/ls :   08/01/2020         This report was finalized on 8/3/2020 12:30 PM by Dr. Joan Quinones MD.       MRI Brain Without Contrast [[id number]] Collected:  08/01/20 0831     Updated:  08/02/20 1400    Narrative:       EXAMINATION: MRI BRAIN WO CONTRAST - 08/01/2020     INDICATION:  I63.9-Cerebral infarction, unspecified; R47.1-Dysarthria  and anarthria. Left lower facial droop. Evaluate for stroke.     TECHNIQUE: Routine multiplanar imaging is obtained of the brain without  the administration of gadolinium contrast.     COMPARISON: NONE     FINDINGS: There is a moderate sized area of abnormal signal seen in the  right temporoparietal region. Findings to suggest an area of  acute  ischemia. No evidence of hemorrhage. Some mild chronic small vessel  ischemic changes seen within the brain. Ventricular system is  unremarkable. Globes and orbits are intact. No evidence of  hydrocephalus. No mass, mass effect, or midline shift. No abnormal  extra-axial fluid collections identified. Pituitary and sellar  unremarkable. Craniovertebral junction is preserved. No cerebellar  pontine angle mass is identified. Visualized paranasal sinuses are  clear.       Impression:       Moderate size acute ischemic insult in the right temporal  parietal region. Minimal surrounding edema with no significant mass  effect or effacement of the ventricular system. No hemorrhage.     DICTATED:   08/01/2020  EDITED/ls :   08/01/2020          This report was finalized on 8/2/2020 1:57 PM by Dr. Joan Quinones MD.       SLP FEES - Fiberoptic Endo Eval Swallow [[id number]] Resulted:  08/02/20 1018     Updated:  08/02/20 1018    Narrative:       This procedure was auto-finalized with no dictation required.    CT Angiogram Head [[id number]] Collected:  07/31/20 0913     Updated:  08/01/20 1837    Narrative:       EXAMINATION: CT ANGIOGRAM HEAD- 07/31/2020     INDICATION: Stroke; I63.9-Cerebral infarction, unspecified;  R47.1-Dysarthria and anarthria     TECHNIQUE: Pre and postcontrast 3 mm and 0.75 mm axial images through  the brain with 2-D and 3-D VRT and MIP angiographic reconstructions.     The radiation dose reduction device was turned on for each scan per the  ALARA (As Low as Reasonably Achievable) protocol.     COMPARISON: Angiographic head CT scan of same date, 5:19 AM     FINDINGS: Current study is timed at 9:04 AM. Perhaps best appreciated on  thin section axial image #220 series 7 is restored flow in the anterior  right M2 branch, compared to the very subtle cut off seen on previous  image 338 series 7. There is again mild decrease in caliber of the  distal right M1 segment, less than 30% narrowing, without  focal  stenosis. Remaining anterior and middle cerebral arteries and proximal  branches appear grossly normal. Distal vertebral arteries, basilar  artery, and posterior cerebral arteries appear grossly normal. Posterior  cerebral arteries are primarily supplied via large posterior  communicating arteries. There is a relatively small basilar artery as a  result. No new intracranial vascular stenosis is identified. Axial  source images again show subtle hypoenhancement of the posterior right  frontal lobe, axial 3 mm image 91 series 902, consistent with an  approximately 4 cm area of mild edema/ischemia.       Impression:       1. Apparent restoration of flow to previously truncated right anterior  temporal branch.  2. Mild narrowing of the distal right M1 branch unchanged.  3. Subtle approximately 4 cm area of hypoenhancement in the posterior  right frontal lobe corresponding to perfusion scan abnormality.     D:  07/31/2020  E:  07/31/2020     This report was finalized on 8/1/2020 6:34 PM by Dr. Jass Milan MD.       CT Head Without Contrast [[id number]] Collected:  07/31/20 0904     Updated:  07/31/20 2356    Narrative:       EXAMINATION: CT HEAD WO CONTRAST- 07/31/2020     INDICATION: Stroke; I63.9-Cerebral infarction, unspecified;  R47.1-Dysarthria and anarthria     TECHNIQUE: 5 mm unenhanced images through the brain     The radiation dose reduction device was turned on for each scan per the  ALARA (As Low as Reasonably Achievable) protocol.     COMPARISON: NONE     FINDINGS: The calvarium appears intact. Included paranasal sinuses and  mastoids appear clear. Soft tissue  images show residual intravascular  contrast from patient's previous angiographic studies earlier this  morning. Allowing for this, there is no evidence of hemorrhage. There is  no evidence of mass mass effect, or well-defined area of cerebral edema.  There is a suggestion of mild loss of gray/white matter differentiation  in the superior left  temporal lobe including the upper left insular  region, axial images 17, 18 and 19 of initial study, image 18 and 19 of  the reconstructed study. There is trace marginal postcontrast  enhancement along the dorsal margin of this region, which may represent  mild capillary leakage, rather than petechial hemorrhage. No potential  edema/infarct is seen elsewhere.       Impression:       Subtle new area of edema in the superior right temporal lobe  and inferior right frontal lobe, corresponding to patient's perfusion  scan abnormality. Trace marginal postcontrast enhancement along the  dorsal margin of this region, or less likely mild petechial hemorrhage.  No new intracranial disease is seen elsewhere.     D:  07/31/2020  E:  07/31/2020     This report was finalized on 7/31/2020 11:53 PM by Dr. Jass Milan MD.       XR Chest 1 View [[id number]] Collected:  07/31/20 0638     Updated:  07/31/20 0640    Narrative:       CR Chest 1 Vw    INDICATION:   Stroke patient. Stroke symptoms this morning. Patient fell. Altered mental status/dysarthria     COMPARISON:    None available.    FINDINGS:  Single portable AP view(s) of the chest.  There is a left-sided tunneled catheter terminating in mid SVC level. There is mild cardiac silhouette enlargement. There is ectasia of the descending thoracic aorta with vascular calcifications. Surgical clips  right supraclavicular region. Evidence for old granulomatous disease. Minimal atelectasis left base. No acute congestive failure pleural effusion or pneumothorax.      Impression:         1. Mild cardiac silhouette enlargement and minimal left base atelectasis. No acute congestive failure.    Signer Name: Jessy Menon MD   Signed: 7/31/2020 6:38 AM   Workstation Name: [email or web address]    [facility]    CT Angiogram Neck [[id number]] Collected:  07/31/20 0606     Updated:  07/31/20 0608    Narrative:       See accompanying CTA head. CTA of the head and neck are dictated  together on that report.    Signer Name: Jessy Menon MD   Signed: 7/31/2020 6:06 AM   Workstation Name: [email or web address]    [facility]    CT Angiogram Head [[id number]] Collected:  07/31/20 0605     Updated:  07/31/20 0607    Narrative:       CTA Head    INDICATION:   Left-sided facial weakness and droop. Last known well around 1:00 AM.    TECHNIQUE:   CT angiogram of the head and neck with IV contrast. Contrast dose recorded in the patient's chart. 3-D reconstructions were obtained and reviewed.  Evaluation for a significant carotid arterial stenosis is based on the NASCET criteria. Radiation dose  reduction techniques included automated exposure control or exposure modulation based on body size. Count of known CT and cardiac nuc med studies performed in previous 12 months: 2.     COMPARISON:   Earlier head CT and CT perfusion    FINDINGS:   CTA neck:  There are atherosclerotic vascular cut locations of the aortic arch including great vessel origins. There is likely mild narrowing at the origin of the innominate and left common carotid artery.    Right carotid system: There is atherosclerotic plaque at the right carotid bifurcation. There is about 30% stenosis by NASCET criteria there is moderate atherosclerotic plaque at the right carotid siphon but it is patent.    Left carotid system: There is calcified plaque at the left carotid bifurcation. By NASCET criteria there is about 20% diameter stenosis. There is mild plaque at the left carotid siphon. It is widely patent.    There is calcified plaque at the origin of the right vertebral artery with mild to moderate stenosis. Both vertebral arteries are patent and codominant. Both supply the basilar.        CTA head:  There is a mild narrowing of the distal right M1 vessel and there is vascular cut off proximal right anterior  M2 vessel with subsequent distal reconstitution. There is at least a 5 mm in length filling defect. This is highly concerning  for a  thromboembolus responsible for the abnormalities seen on the perfusion CT. There is no significant sized anterior communicating artery. No additional intracranial vascular cut off is suspected. Some distal vascular irregularity is likely. This could be a  manifestation of intracranial atherosclerotic disease. There are bilateral posterior communicating arteries with essentially fetal origin bilateral posterior cerebral artery distribution. The dural venous sinuses are patent. The P1 vessels are  hypoplastic left more so than right.    There is dental disease. There are cervical spine degenerative changes.      Impression:         1. There is a cutoff in the proximal right M2 vessel most likely due to thromboembolic disease and consistent with the area of ischemia are seen on the earlier perfusion CT. Finding called by myself to Dr. Forman at 6:00 AM. Patient is already in the  Cath Lab.  2. By NASCET criteria there is about 30% diameter stenosis at the right carotid bifurcation and 20% diameter stenosis on the left carotid bifurcation.  3. Extensive atherosclerotic vascular calcifications as above. Suspect some intracranial atherosclerotic disease also.  4. No definite anterior communicating artery. There are bilateral posterior to indicating arteries with largely fetal origin posterior cerebral artery distributions. The P1 vessels are hypoplastic.    Signer Name: Jessy Menon MD   Signed: 7/31/2020 6:05 AM   Workstation Name: [email or web address]    [facility]    CT Cerebral Perfusion With & Without Contrast [[id number]] Collected:  07/31/20 0535     Updated:  07/31/20 0537    Narrative:       CT CEREBRAL PERFUSION W WO CONTRAST    HISTORY:   Left-sided weakness and facial drooping for less than 6 hours.    TECHNIQUE:   Axial CT images of the brain without and with intravenous contrast using cerebral perfusion protocol. Post-processing parametric maps were created using RAPID software and  reviewed. Radiation dose reduction techniques included automated exposure control  or exposure modulation based on body size. CT and nuclear cardiology exams in the last 12 months: 0.    COMPARISON:   Head CT same day    FINDINGS:   Arterial input function is optimal.     PERFUSION PARAMETERS:  *  Ischemic tissue volume (Tmax > 6 sec.): 34 mL.  *  Infarct core volume (CBF < 30%): 5 mL.  *  Mismatch (penumbra) volume: 29 mL.  *  Mismatch ratio: 6.8.  *  Location/territory: Right frontal lobe.    COLLATERAL CIRCULATION PARAMETERS:  *  Volume poor collateral perfusion (Tmax > 10 sec.): 10 mL.  *  Hypoperfusion index (Tmax >10 sec./Tmax > 6 sec.): 0.3.  *  CBV Index (rCBV in Tmax > 6 sec. region): 0.7.      Impression:       Ischemic tissue volume of 34 mL in the right frontal lobe with an infarct core volume of 5 mL. Consider MRI for follow-up.      Signer Name: Umesh Babb MD   Signed: 7/31/2020 5:35 AM   Workstation Name: [email or web address]    [facility]    CT Head Without Contrast Stroke Protocol [[id number]] Collected:  07/31/20 0530     Updated:  07/31/20 0532    Narrative:       CT Head WO Code Stroke    HISTORY:   Left-sided weakness and facial drooping starting at 1:00 AM.    TECHNIQUE:   Axial unenhanced head CT with multiplanar reformats. Radiation dose reduction techniques included automated exposure control or exposure modulation based on body size. Count of known CT and cardiac nuc med studies performed in previous 12 months: 0.     COMPARISON:   None.    FINDINGS:   Ventricular size and configuration are normal. No acute infarct or hemorrhage is identified. There are no masses. There is no skull fracture.  There is mild atrophy with chronic small vessel ischemic disease in the white matter. Atherosclerotic  calcifications are present in the carotid siphons.      Impression:       Senescent changes without acute abnormality.    The examination was performed at 0519 hours and results  were called by telephone at 7/31/2020 5:28 AM to Robbie CT technologist who verbally acknowledged these results.        Signer Name: Umesh Babb MD   Signed: 7/31/2020 5:30 AM   Workstation Name: [email or web address]    [facility]                    Results for orders placed during the hospital encounter of 07/31/20   Adult Transthoracic Echo Complete W/ Cont if Necessary Per Protocol (With Agitated Saline)    Narrative · Estimated EF = 55%.  · Left ventricular systolic function is normal.  · Trace to mild mitral regurgitation  · Trace tricuspid regurgitation          Plan for Follow-up of Pending Labs/Results: None    Discharge Details        Discharge Medications      New Medications      Instructions Start Date   aspirin 325 MG tablet  Replaces:  aspirin 81 MG chewable tablet   325 mg, Oral, Daily   Start Date:  August 7, 2020     clopidogrel 75 MG tablet  Commonly known as:  PLAVIX   75 mg, Oral, Daily   Start Date:  August 7, 2020        Changes to Medications      Instructions Start Date   atorvastatin 80 MG tablet  Commonly known as:  LIPITOR  What changed:    · medication strength  · how much to take   80 mg, Oral, Every Night at Bedtime      metoprolol succinate XL 50 MG 24 hr tablet  Commonly known as:  TOPROL-XL  What changed:    · medication strength  · how much to take   50 mg, Oral, Every Night at Bedtime         Continue These Medications      Instructions Start Date   gabapentin 300 MG capsule  Commonly known as:  NEURONTIN   300 mg, Oral, 2 times daily      insulin lispro protamine-insulin lispro (75-25) 100 UNIT/ML suspension injection  Commonly known as:  humaLOG 75-25   22 Units, Subcutaneous, 2 Times Daily With Meals      levothyroxine 88 MCG tablet  Commonly known as:  SYNTHROID, LEVOTHROID   88 mcg, Oral, Every Morning      loratadine 10 MG tablet  Commonly known as:  CLARITIN   10 mg, Oral, Every Morning      losartan 100 MG tablet  Commonly known as:  COZAAR   100 mg,  Oral, Every Morning      NIFEdipine XL 60 MG 24 hr tablet  Commonly known as:  PROCARDIA XL   60 mg, Oral, Every Morning      spironolactone 25 MG tablet  Commonly known as:  ALDACTONE   25 mg, Oral, 2 Times Daily         Stop These Medications    aspirin 81 MG chewable tablet  Replaced by:  aspirin 325 MG tablet     omeprazole 20 MG capsule  Commonly known as:  priLOSEC            Allergies   Allergen Reactions   • Darvon [Propoxyphene] GI Intolerance   • Lortab [Hydrocodone-Acetaminophen] GI Intolerance         Discharge Disposition:  Home or Self Care    Diet:  Hospital:  Diet Order   Procedures   • Diet Dysphagia; II - Pureed; Nectar / Syrup Thick; No Straws, Extra Sauce / Gravy; Consistent Carbohydrate            CODE STATUS:    Code Status and Medical Interventions:   Ordered at: 07/31/20 0803     Code Status:    CPR     Medical Interventions (Level of Support Prior to Arrest):    Full       No future appointments.              Electronically signed by Shantell Holman II, DO, 08/06/20, 11:06 AM.      Time Spent on Discharge:  I spent  36  minutes on this discharge activity which included: face-to-face encounter with the patient, reviewing the data in the system, coordination of the care with the nursing staff as well as consultants, documentation, and entering orders.          Electronically signed by Shantell Holman II, DO at 08/06/20 2914

## 2020-08-06 NOTE — PROGRESS NOTES
Case Management Discharge Note      Final Note: Rehab at [place].  [facility] transport van at 2:30         Destination - Selection Complete      Service Provider Request Status Selected Services Address Phone Number Fax Number    [facility] [address] [address] [phone]      Durable Medical Equipment      No service has been selected for the patient.      Dialysis/Infusion      No service has been selected for the patient.      Home Medical Care      No service has been selected for the patient.      Therapy      No service has been selected for the patient.      Community Resources      No service has been selected for the patient.        Transportation Services  Ambulance: [facility]    Final Discharge Disposition Code: 62 - inpatient rehab facility

## 2020-08-07 ENCOUNTER — READMISSION MANAGEMENT (OUTPATIENT)
Dept: CALL CENTER | Facility: HOSPITAL | Age: 80
End: 2020-08-07

## 2020-08-07 NOTE — OUTREACH NOTE
Prep Survey      Responses   [facility] patient discharged from?  [place]   Is LACE score < 7 ?  No   Eligibility  Not Eligible   What are the reasons patient is not eligible?  [facility]   Does the patient have one of the following disease processes/diagnoses(primary or secondary)?  Stroke (TIA)   Prep survey completed?  Yes          Tisha Menendez RN

## 2020-08-24 ENCOUNTER — TELEPHONE (OUTPATIENT)
Dept: NEUROSURGERY | Facility: CLINIC | Age: 80
End: 2020-08-24

## 2020-08-24 NOTE — TELEPHONE ENCOUNTER
PT'S DAUGHTER (MAKAYLA) CALLED AND STATES THAT PT WAS GIVEN A NEUROSURGEON TO FOLLOW UP WITH ONCE SHE RETURNS HOME TO [place]/DAUGHTER STATES THE NEUROSURGEON NEEDS A REFERRAL FROM US TO SCHEDULED APPT.    DR BOWER:   PH: [phone]  FAX: [phone]    DAUGHTER (POA) CAN BE REACHED AT: [phone]    PT IS DUE TO BE DISCHARGED FROM [facility] ON 08/27/2020.    DAUGHTER IS WANTING TO GET THERAPY PLACED AND DOCTOR APPTS FOR NEXT WEEK DUE TO THE ASSISTED LIVING RULES REGARDING COVID 19.    THANK YOU!

## 2020-08-28 ENCOUNTER — READMISSION MANAGEMENT (OUTPATIENT)
Dept: CALL CENTER | Facility: HOSPITAL | Age: 80
End: 2020-08-28

## 2020-08-28 NOTE — OUTREACH NOTE
Prep Survey      Responses   [facility] facility patient discharged from?  [place]   Is LACE score < 7 ?  No   Eligibility  Readm Mgmt   Discharge diagnosis  Acute ischemic stroke    Does the patient have one of the following disease processes/diagnoses(primary or secondary)?  Stroke (TIA)   Does the patient have Home health ordered?  No   Is there a DME ordered?  No   General alerts for this patient  plan DC from rehab on 8/27   Prep survey completed?  Yes          Shannon Ramos RN

## 2020-09-01 ENCOUNTER — READMISSION MANAGEMENT (OUTPATIENT)
Dept: CALL CENTER | Facility: HOSPITAL | Age: 80
End: 2020-09-01

## 2020-09-01 NOTE — OUTREACH NOTE
Stroke Week 4 Survey      Responses   [facility] patient discharged from?  [place]   Does the patient have one of the following disease processes/diagnoses(primary or secondary)?  Stroke (TIA)   Week 4 attempt successful?  Yes   Call start time  1503   Call end time  1510   General alerts for this patient  Pt is going into a AL facility in [facility].    Discharge diagnosis  Acute ischemic stroke    Person spoke with today (if not patient) and relationship  Irena-Daughter in law    Meds reviewed with patient/caregiver?  Yes   Is the patient having any side effects they believe may be caused by any medication additions or changes?  No   Is the patient taking all medications as directed (includes completed medication regime)?  Yes   Has the patient kept scheduled appointments due by today?  Yes [Pt had a telehealth appt with PCP in [place] on 9/1/20]   Comments  Pt is from [facility]. She went back there after discharge from [facility]. Her PCP is Veronica Ellsworth MD.    Is the patient still receiving Home Health Services?  No   Home health comments  Pt is to receive PT, OT, and ST however she has quarantined for 14 days   Psychosocial issues?  No   Does the patient require any assistance with activities of daily living such as eating, bathing, dressing, walking, etc.?  Yes   Does the patient have any residual symptoms from stroke/TIA?  Yes   Residual symptoms comments  facial drooping and weakness   Does the patient understand the diet ordered at discharge?  Yes   What is the patient's perception of their health status since discharge?  Improving   Nursing interventions  Nurse provided patient education, Advised patient to call provider   Is the patient able to teach back FAST for Stroke?  Yes   Is the patient/caregiver able to teach back the risk factors for a stroke?  Smoking   Is the patient/caregiver able to teach back signs and symptoms related to disease process for when to call PCP?  Yes   Is  the patient/caregiver able to teach back signs and symptoms related to disease process for when to call 911?  Yes   If the patient is a current smoker, are they able to teach back resources for cessation?  -- [Nonsmoker]   Is the patient/caregiver able to teach back the hierarchy of who to call/visit for symptoms/problems? PCP, Specialist, Home health nurse, Urgent Care, ED, 911  Yes   Week 4 Call Completed?  Yes   Would the patient like one additional call?  No   Graduated  Yes   Did the patient feel the follow up calls were helpful during their recovery period?  Yes   Was the number of calls appropriate?  Yes          Ragini Rivera RN

## 2020-09-10 ENCOUNTER — TELEPHONE (OUTPATIENT)
Dept: CARDIOLOGY | Facility: CLINIC | Age: 80
End: 2020-09-10

## 2020-09-10 NOTE — TELEPHONE ENCOUNTER
I called and spoke with the patients daughter in law to relay the results of the patients zio monitor. [place] and she said she is under cardiology care but seeking a new cardiologist. They will have the new cardiologist request medical records.

## 2020-11-16 ENCOUNTER — CALL CENTER PROGRAMS (OUTPATIENT)
Dept: CALL CENTER | Facility: HOSPITAL | Age: 80
End: 2020-11-16

## 2022-07-17 NOTE — OUTREACH NOTE
Stroke [place] Survey      Responses   Facility patient discharged from?  [place]   Attempt successful  Yes   Call start time  1338   Person spoke with today (if not patient) and relationship  Caregiver   Did the patient die within 30 days of admission?  No   Did the patient die within 30 days of discharge?  No   Call end time  1340   Patient location 30 days post discharge if known  Chronic health care facility   Was the patient readmitted within 30 days of discharge?  No   Could you live alone without any help from another person?  Yes   Can you do everything that you were doing right before your stroke even if slower and not as much?  Yes   Are you completely back to the way you were right before your stroke?  No   Can you walk from one room to another without help from another person?  Yes   Can the patient sit up in bed without any help?  Yes   Call Center [place] Score  1   [place] score call completed  Yes   Comments  Spoke with daughter-in-law Irena.  Patient is due to transition from assisted living to home tomorrow.  She has mild left sided weakness.  Able to ambulate independently with her walker and manage all daily activities.          Shannon Ramos RN   [Erythematous Oropharynx] : erythematous oropharynx [NL] : warm, clear [FreeTextEntry7] : expiratory wheezes, intermittent stridor

## (undated) DEVICE — DISTAL ACCESS CATHETER: Brand: AXS CATALYST 6

## (undated) DEVICE — LIMB HOLDER, WRIST/ANKLE: Brand: DEROYAL

## (undated) DEVICE — LEX NEURO ANGIOGRAPHY: Brand: MEDLINE INDUSTRIES, INC.

## (undated) DEVICE — TREVO XP PROVUE RETRIEVER: Brand: TREVO XP

## (undated) DEVICE — BALLOON GUIDE CATHETER: Brand: FLOWGATE2

## (undated) DEVICE — ANGIO-SEAL VIP VASCULAR CLOSURE DEVICE: Brand: ANGIO-SEAL

## (undated) DEVICE — CATH TEMPO 5F BER 100CM: Brand: TEMPO

## (undated) DEVICE — Device

## (undated) DEVICE — RADIFOCUS GLIDEWIRE: Brand: GLIDEWIRE

## (undated) DEVICE — STPCK LP 1WY RA 200PSI

## (undated) DEVICE — INTRO SHEATH ENGAGE W/50 GW .038 8F12

## (undated) DEVICE — ROTATING HEMOSTATIC VALVE .096": Brand: RHV

## (undated) DEVICE — SKIN PREP TRAY W/CHG: Brand: MEDLINE INDUSTRIES, INC.

## (undated) DEVICE — CATH MIC PHENOM .021 .018IN 160CM

## (undated) DEVICE — STPCK 3/WY HP M/RA W/OFF/HNDL 1050PSI STRL

## (undated) DEVICE — GUIDEWIRE WITH ICE™ HYDROPHILIC COATING: Brand: TRANSEND™ EX

## (undated) DEVICE — ST ACC MICROPUNCTURE .018 TRANSLSS/SS/TP 5F/10CM 21G/7CM